# Patient Record
Sex: FEMALE | Race: WHITE | NOT HISPANIC OR LATINO | Employment: OTHER | URBAN - METROPOLITAN AREA
[De-identification: names, ages, dates, MRNs, and addresses within clinical notes are randomized per-mention and may not be internally consistent; named-entity substitution may affect disease eponyms.]

---

## 2017-01-05 ENCOUNTER — OFFICE VISIT (OUTPATIENT)
Dept: NEUROSURGERY | Facility: CLINIC | Age: 77
End: 2017-01-05

## 2017-01-05 VITALS
WEIGHT: 143 LBS | HEIGHT: 63 IN | TEMPERATURE: 96.3 F | SYSTOLIC BLOOD PRESSURE: 120 MMHG | BODY MASS INDEX: 25.34 KG/M2 | DIASTOLIC BLOOD PRESSURE: 70 MMHG

## 2017-01-05 DIAGNOSIS — G56.03 BILATERAL CARPAL TUNNEL SYNDROME: ICD-10-CM

## 2017-01-05 DIAGNOSIS — G56.21 ULNAR NEUROPATHY AT ELBOW OF RIGHT UPPER EXTREMITY: Primary | ICD-10-CM

## 2017-01-05 PROCEDURE — 99024 POSTOP FOLLOW-UP VISIT: CPT | Performed by: NEUROLOGICAL SURGERY

## 2017-01-05 NOTE — PROGRESS NOTES
Alanna Rodriges  1940  [id number]                       CURRENT WORKING DIAGNOSIS:  Status post decompression right ulnar nerve         MEDICAL HISTORY SINCE LAST ENCOUNTER:  She has both clinically and subjectively better.  Her strength has improved and she has less paresthesia and numbness in the distribution of our nerve.            Past Medical History   Diagnosis Date   • Anemia    • Anxiety disorder    • Arthritis    • Cataract    • Depression    • Dermatitis    • DVT (deep venous thrombosis)      1975   • Elbow pain    • GERD (gastroesophageal reflux disease)    • Hepatitis C    • History of blood transfusion    • History of hepatitis C virus infection    • History of migraine    • Hypothyroidism    • Kidney stones    • Migraine      occular   • PONV (postoperative nausea and vomiting)    • Ptosis due to aging    • Seborrhea    • Seizure 1975     during hospitalization as a side effect of Tofranil    • Shoulder pain      right   • Spinal headache               Past Surgical History   Procedure Laterality Date   • Ileostomy     • Colectomy total     • Bunionectomy Bilateral    • Hysterectomy       bso   • D&c and laparoscopy     • Urethral dilation       multiple   • Skin biopsy     • Colon surgery     • Ulnar nerve decompression Right 12/9/2016     Procedure: RIGHT ULNAR NERVE DECOMPRESSION;  Surgeon: Edmond Mccrary MD;  Location: [facility];  Service:               Family History   Problem Relation Age of Onset   • Alzheimer's disease Other    • Leukemia Other               Social History     Social History   • Marital status:      Spouse name: N/A   • Number of children: N/A   • Years of education: N/A     Occupational History   • Not on file.     Social History Main Topics   • Smoking status: Former Smoker   • Smokeless tobacco: Never Used      Comment: quit smoking in her 40's    • Alcohol use 8.4 oz/week     14 Glasses of wine per week   • Drug use: No   • Sexual activity: Defer     Other  Topics Concern   • Not on file     Social History Narrative              Allergies   Allergen Reactions   • Codeine Nausea Only   • Morphine And Related Hallucinations     After 3 days              Current Outpatient Prescriptions:   •  ALPRAZolam (XANAX) 0.25 MG tablet, Take 0.25 mg by mouth As Needed for anxiety., Disp: , Rfl:   •  aspirin 81 MG chewable tablet, Chew 81 mg Daily. Last dose 11/28/2016, Disp: , Rfl:   •  Biotin (BIOTIN MAXIMUM STRENGTH) 10 MG tablet, Take  by mouth 2 (Two) Times a Day. Stopped one week prior to surgery, Disp: , Rfl:   •  Cholecalciferol (D3-1000) 1000 UNITS capsule, Take 1 capsule by mouth 2 (Two) Times a Day. Stopped one week prior to surgery, Disp: , Rfl:   •  dicyclomine (BENTYL) 10 MG capsule, Take 10 mg by mouth Every Night., Disp: , Rfl:   •  estrogens, conjugated, (PREMARIN) 0.625 MG tablet, Take 0.625 mg by mouth Daily., Disp: , Rfl:   •  ketoconazole (NIZORAL) 2 % cream, Apply 1 application topically Daily. Shampoo weekly, Disp: , Rfl:   •  ketoconazole (NIZORAL) 2 % shampoo, Apply  topically 1 (One) Time Per Week., Disp: , Rfl:   •  levothyroxine (SYNTHROID, LEVOTHROID) 75 MCG tablet, Take 75 mcg by mouth Daily., Disp: , Rfl:   •  Omega-3 Fatty Acids (FISH OIL PO), Take  by mouth 2 (Two) Times a Day. Stopped one week prior to surgery, Disp: , Rfl:   •  omeprazole (priLOSEC) 40 MG capsule, Take 40 mg by mouth Daily., Disp: , Rfl:   •  polyethyl glycol-propyl glycol (SYSTANE) 0.4-0.3 % solution ophthalmic solution, 1 drop 3 (Three) Times a Day., Disp: , Rfl:   •  RABEprazole (ACIPHEX) 20 MG EC tablet, Take 20 mg by mouth Daily., Disp: , Rfl:   •  vitamin B-12 (CYANOCOBALAMIN) 500 MCG tablet, Take 500 mcg by mouth Daily., Disp: , Rfl:          Review of Systems   Constitutional: Negative for activity change, appetite change, chills, diaphoresis, fatigue, fever and unexpected weight change.   HENT: Negative for congestion, dental problem, drooling, ear discharge, ear pain,  "facial swelling, hearing loss, mouth sores, nosebleeds, postnasal drip, rhinorrhea, sinus pressure, sneezing, sore throat, tinnitus, trouble swallowing and voice change.    Eyes: Negative for photophobia, pain, discharge, redness, itching and visual disturbance.   Respiratory: Negative for apnea, cough, choking, chest tightness, shortness of breath, wheezing and stridor.    Cardiovascular: Negative for chest pain, palpitations and leg swelling.   Gastrointestinal: Negative for abdominal distention, abdominal pain, anal bleeding, blood in stool, constipation, diarrhea, nausea, rectal pain and vomiting.   Musculoskeletal: Negative for arthralgias, back pain, gait problem, joint swelling, myalgias, neck pain and neck stiffness.   Skin: Negative for color change, pallor, rash and wound.   Allergic/Immunologic: Negative for environmental allergies, food allergies and immunocompromised state.   Neurological: Negative for dizziness, tremors, seizures, syncope, facial asymmetry, speech difficulty, weakness, light-headedness, numbness and headaches.   Hematological: Negative for adenopathy. Does not bruise/bleed easily.   Psychiatric/Behavioral: Negative for agitation, behavioral problems, confusion, decreased concentration, dysphoric mood, self-injury, sleep disturbance and suicidal ideas. The patient is not nervous/anxious and is not hyperactive.                Vitals:    01/05/17 1615   BP: 120/70   Temp: 96.3 °F (35.7 °C)   Weight: 143 lb (64.9 kg)   Height: 63\" (160 cm)               EXAMINATION: The wound is well-healed.  She has good strength which she did not have prior.             MEDICAL DECISION MAKING: Status post ulnar decompression            ASSESSMENT/DISPOSITION:  Referred her to physical therapy for continuation of rehabilitation.              I APPRECIATE THE OPPORTUNITY OF THIS REFERRAL. PLEASE CALL IF ANY       QUESTIONS [phone]            Scribed for Edmond Mccrary MD by Ninoska Gomes CMA. " 1/5/2017  4:17 PM          I have read and concur with the information provided by the scribe.      Edmond Mccrary MD

## 2017-01-05 NOTE — MR AVS SNAPSHOT
Alanna SOLORIO Antelmo   1/5/2017 4:30 PM   Office Visit    Dept Phone:  [phone]   Encounter #:  [id number]    Provider:  Edmond Mccrary MD   Department:  [facility]                Your Full Care Plan              Today's Medication Changes          These changes are accurate as of: 1/5/17 11:59 PM.  If you have any questions, ask your nurse or doctor.               Stop taking medication(s)listed here:     atorvastatin 10 MG tablet   Commonly known as:  LIPITOR   Stopped by:  Edmond Mccrary MD           oxyCODONE-acetaminophen 5-325 MG per tablet   Commonly known as:  PERCOCET   Stopped by:  Edmond Mccrary MD                      Your Updated Medication List          This list is accurate as of: 1/5/17 11:59 PM.  Always use your most recent med list.                ALPRAZolam 0.25 MG tablet   Commonly known as:  XANAX       aspirin 81 MG chewable tablet       BIOTIN MAXIMUM STRENGTH 10 MG tablet   Generic drug:  Biotin       D3-1000 1000 UNITS capsule   Generic drug:  Cholecalciferol       dicyclomine 10 MG capsule   Commonly known as:  BENTYL       FISH OIL PO       * ketoconazole 2 % shampoo   Commonly known as:  NIZORAL       * ketoconazole 2 % cream   Commonly known as:  NIZORAL       levothyroxine 75 MCG tablet   Commonly known as:  SYNTHROID, LEVOTHROID       omeprazole 40 MG capsule   Commonly known as:  priLOSEC       polyethyl glycol-propyl glycol 0.4-0.3 % solution ophthalmic solution   Commonly known as:  SYSTANE       PREMARIN 0.625 MG tablet   Generic drug:  estrogens (conjugated)       RABEprazole 20 MG EC tablet   Commonly known as:  ACIPHEX       vitamin B-12 500 MCG tablet   Commonly known as:  CYANOCOBALAMIN       * Notice:  This list has 2 medication(s) that are the same as other medications prescribed for you. Read the directions carefully, and ask your doctor or other care provider to review them with you.            We  Performed the Following     Ambulatory Referral to Physical Therapy Evaluate and treat, Neuro       You Were Diagnosed With        Codes Comments    Ulnar neuropathy at elbow of right upper extremity    -  Primary ICD-10-CM: G56.21  ICD-9-CM: 354.2     Bilateral carpal tunnel syndrome     ICD-10-CM: G56.03  ICD-9-CM: 354.0       Instructions     None    Patient Instructions History      Upcoming Appointments     Visit Type Date Time Department    MEDICARE - FOLLOWUP PT 2017 11:00 AM [facility]    MEDICARE - FOLLOWUP PT 3/1/2017  2:00 PM [facility]      MyChart Signup     [employer] allows you to send messages to your doctor, view your test results, renew your prescriptions, schedule appointments, and more. To sign up, go to [employer] and click on the Sign Up Now link in the New User? box. Enter your [facility] Activation Code exactly as it appears below along with the last four digits of your Social Security Number and your Date of Birth () to complete the sign-up process. If you do not sign up before the expiration date, you must request a new code.    [facility] Activation Code: [email or web address]  Expires: 2017  9:47 AM    If you have questions, you can email [email or web address] or call [phone] to talk to our [facility] staff. Remember, [employer] is NOT to be used for urgent needs. For medical emergencies, dial 911.               Other Info from Your Visit           Your Appointments     2017 11:00 AM EST   PT FOLLOWUP with Brody Leon, PT   [facility] (--)    [address] [address]   [phone]            Mar 01, 2017  2:00 PM EST   PT FOLLOWUP with Brody Leon PT   [facility] (--)    [address] [address]   [phone]              Allergies     Codeine  Nausea Only    Morphine And Related  Hallucinations    After 3 days     "  Reason for Visit     Follow-up           Vital Signs     Blood Pressure Temperature Height Weight Body Mass Index Smoking Status    120/70 96.3 °F (35.7 °C) 63\" (160 cm) 143 lb (64.9 kg) 25.33 kg/m2 Former Smoker      Problems and Diagnoses Noted     Ulnar neuropathy at elbow of right upper extremity    Bilateral carpal tunnel syndrome            "

## 2017-01-05 NOTE — LETTER
January 9, 2017     Ismael Jones MD  [address] [id number]    Patient: Alanna Rodriges   YOB: 1940   Date of Visit: 1/5/2017       Dear Dr. Robert MD:    Alanna Rodriges was in my office today. Below is a copy of my note.    If you have questions, please do not hesitate to call me. I look forward to following Alanna along with you.         Sincerely,        Edmond Mccrary MD        CC: Romaine Tanner MD    Alanna Rodriges  1940  [id number]                       CURRENT WORKING DIAGNOSIS:  Status post decompression right ulnar nerve         MEDICAL HISTORY SINCE LAST ENCOUNTER:  She has both clinically and subjectively better.  Her strength has improved and she has less paresthesia and numbness in the distribution of our nerve.            Past Medical History   Diagnosis Date   • Anemia    • Anxiety disorder    • Arthritis    • Cataract    • Depression    • Dermatitis    • DVT (deep venous thrombosis)      1975   • Elbow pain    • GERD (gastroesophageal reflux disease)    • Hepatitis C    • History of blood transfusion    • History of hepatitis C virus infection    • History of migraine    • Hypothyroidism    • Kidney stones    • Migraine      occular   • PONV (postoperative nausea and vomiting)    • Ptosis due to aging    • Seborrhea    • Seizure 1975     during hospitalization as a side effect of Tofranil    • Shoulder pain      right   • Spinal headache               Past Surgical History   Procedure Laterality Date   • Ileostomy     • Colectomy total     • Bunionectomy Bilateral    • Hysterectomy       bso   • D&c and laparoscopy     • Urethral dilation       multiple   • Skin biopsy     • Colon surgery     • Ulnar nerve decompression Right 12/9/2016     Procedure: RIGHT ULNAR NERVE DECOMPRESSION;  Surgeon: Edmond Mccrary MD;  Location: [facility];  Service:               Family History   Problem Relation Age of Onset   • Alzheimer's disease Other    •  Leukemia Other               Social History     Social History   • Marital status:      Spouse name: N/A   • Number of children: N/A   • Years of education: N/A     Occupational History   • Not on file.     Social History Main Topics   • Smoking status: Former Smoker   • Smokeless tobacco: Never Used      Comment: quit smoking in her 40's    • Alcohol use 8.4 oz/week     14 Glasses of wine per week   • Drug use: No   • Sexual activity: Defer     Other Topics Concern   • Not on file     Social History Narrative              Allergies   Allergen Reactions   • Codeine Nausea Only   • Morphine And Related Hallucinations     After 3 days              Current Outpatient Prescriptions:   •  ALPRAZolam (XANAX) 0.25 MG tablet, Take 0.25 mg by mouth As Needed for anxiety., Disp: , Rfl:   •  aspirin 81 MG chewable tablet, Chew 81 mg Daily. Last dose 11/28/2016, Disp: , Rfl:   •  Biotin (BIOTIN MAXIMUM STRENGTH) 10 MG tablet, Take  by mouth 2 (Two) Times a Day. Stopped one week prior to surgery, Disp: , Rfl:   •  Cholecalciferol (D3-1000) 1000 UNITS capsule, Take 1 capsule by mouth 2 (Two) Times a Day. Stopped one week prior to surgery, Disp: , Rfl:   •  dicyclomine (BENTYL) 10 MG capsule, Take 10 mg by mouth Every Night., Disp: , Rfl:   •  estrogens, conjugated, (PREMARIN) 0.625 MG tablet, Take 0.625 mg by mouth Daily., Disp: , Rfl:   •  ketoconazole (NIZORAL) 2 % cream, Apply 1 application topically Daily. Shampoo weekly, Disp: , Rfl:   •  ketoconazole (NIZORAL) 2 % shampoo, Apply  topically 1 (One) Time Per Week., Disp: , Rfl:   •  levothyroxine (SYNTHROID, LEVOTHROID) 75 MCG tablet, Take 75 mcg by mouth Daily., Disp: , Rfl:   •  Omega-3 Fatty Acids (FISH OIL PO), Take  by mouth 2 (Two) Times a Day. Stopped one week prior to surgery, Disp: , Rfl:   •  omeprazole (priLOSEC) 40 MG capsule, Take 40 mg by mouth Daily., Disp: , Rfl:   •  polyethyl glycol-propyl glycol (SYSTANE) 0.4-0.3 % solution ophthalmic solution, 1  "drop 3 (Three) Times a Day., Disp: , Rfl:   •  RABEprazole (ACIPHEX) 20 MG EC tablet, Take 20 mg by mouth Daily., Disp: , Rfl:   •  vitamin B-12 (CYANOCOBALAMIN) 500 MCG tablet, Take 500 mcg by mouth Daily., Disp: , Rfl:          Review of Systems   Constitutional: Negative for activity change, appetite change, chills, diaphoresis, fatigue, fever and unexpected weight change.   HENT: Negative for congestion, dental problem, drooling, ear discharge, ear pain, facial swelling, hearing loss, mouth sores, nosebleeds, postnasal drip, rhinorrhea, sinus pressure, sneezing, sore throat, tinnitus, trouble swallowing and voice change.    Eyes: Negative for photophobia, pain, discharge, redness, itching and visual disturbance.   Respiratory: Negative for apnea, cough, choking, chest tightness, shortness of breath, wheezing and stridor.    Cardiovascular: Negative for chest pain, palpitations and leg swelling.   Gastrointestinal: Negative for abdominal distention, abdominal pain, anal bleeding, blood in stool, constipation, diarrhea, nausea, rectal pain and vomiting.   Musculoskeletal: Negative for arthralgias, back pain, gait problem, joint swelling, myalgias, neck pain and neck stiffness.   Skin: Negative for color change, pallor, rash and wound.   Allergic/Immunologic: Negative for environmental allergies, food allergies and immunocompromised state.   Neurological: Negative for dizziness, tremors, seizures, syncope, facial asymmetry, speech difficulty, weakness, light-headedness, numbness and headaches.   Hematological: Negative for adenopathy. Does not bruise/bleed easily.   Psychiatric/Behavioral: Negative for agitation, behavioral problems, confusion, decreased concentration, dysphoric mood, self-injury, sleep disturbance and suicidal ideas. The patient is not nervous/anxious and is not hyperactive.                Vitals:    01/05/17 1615   BP: 120/70   Temp: 96.3 °F (35.7 °C)   Weight: 143 lb (64.9 kg)   Height: 63\" (160 " cm)               EXAMINATION: The wound is well-healed.  She has good strength which she did not have prior.             MEDICAL DECISION MAKING: Status post ulnar decompression            ASSESSMENT/DISPOSITION:  Referred her to physical therapy for continuation of rehabilitation.              I APPRECIATE THE OPPORTUNITY OF THIS REFERRAL. PLEASE CALL IF ANY       QUESTIONS [phone]            Scribed for Edmond Mccrary MD by Ninoska Gomes CMA. 1/5/2017  4:17 PM          I have read and concur with the information provided by the scribe.      Edmond Mccrary MD

## 2017-01-18 ENCOUNTER — TREATMENT (OUTPATIENT)
Dept: PHYSICAL THERAPY | Facility: CLINIC | Age: 77
End: 2017-01-18

## 2017-01-18 DIAGNOSIS — G56.03 BILATERAL CARPAL TUNNEL SYNDROME: Primary | ICD-10-CM

## 2017-01-18 PROCEDURE — 97161 PT EVAL LOW COMPLEX 20 MIN: CPT | Performed by: PHYSICAL THERAPIST

## 2017-01-23 NOTE — PROGRESS NOTES
Physical Therapy Initial Evaluation and Plan of Care    Subjective Evaluation    History of Present Illness  Mechanism of injury: Pt states that she has had chronic carpal tunnel and problems with the right elbow from spending a lot of time writing, typing, and doing heavier lifting when she was younger. The pain started to develop with pressure mostly and then intensified. She had surgery for release recently. Since the surgery she has noticed only having a dull aching pain in the right elbow w/ chronic carpal tunnel.     Quality of life: good    Pain  Current pain ratin  Location: occasionally on the dorsal/proximal ulna to the olecranon and radiating to the distal dorsal humerus   Quality: dull ache  Relieving factors: rest  Aggravating factors: lifting  Progression: improved    Hand dominance: right    Patient Goals  Patient goals for therapy: decreased pain, increased motion and return to sport/leisure activities  Patient goal: Short Term Goals: 2 weeks  1. Pt will be independent with HEP  2. Pt will demonstrate right wrist and elbow AROM equal to that of the left   Long Term Goals: 6 weeks  1. Pt will demonstrate right  and pinhc strength equal to that of the left   2. Pt will report being able to return to working out without increasing pain in the right elbow   3. Pt will report 0/10 pain with all activity            Objective     Observations     Right Elbow   Positive for incision.     Additional Observation Details  Incision is well healed, no signs of infection noted     Palpation     Additional Palpation Details  No tenderness to palpation noted in the right elbow     Active Range of Motion     Left Elbow   Forearm supination: 108 degrees   Forearm pronation: 98 degrees     Right Elbow   Flexion: WFL  Extension: WFL  Forearm supination: 108 degrees   Forearm pronation: 72 degrees     Left Wrist   Radial deviation: 15 degrees   Ulnar deviation: 37 degrees     Right Wrist   Radial deviation: 10  degrees   Ulnar deviation: 21 degrees     Strength/Myotome Testing     Left Wrist/Hand      (2nd hand position)     Trial 1: 44    Thumb Strength  Key/Lateral Pinch     Trail 1: 12    Right Wrist/Hand      (2nd hand position)     Trial 1: 31    Thumb Strength   Key/Lateral Pinch     Trial 1: 8         Assessment & Plan     Assessment  Impairments: abnormal or restricted ROM, activity intolerance, impaired physical strength, lacks appropriate home exercise program and pain with function  Assessment details: Pt presents with s/s consistent with s/p R elbow ulnar nerve release. She will benefit from skilled PT services to address the listed impairments   Prognosis: good    Goals  Short Term Goals: 2 weeks  1. Pt will be independent with HEP  2. Pt will demonstrate right wrist and elbow AROM equal to that of the left   Long Term Goals: 6 weeks  1. Pt will demonstrate right  and pinhc strength equal to that of the left   2. Pt will report being able to return to working out without increasing pain in the right elbow   3. Pt will report 0/10 pain with all activity     Plan  Therapy options: will be seen for skilled physical therapy services  Planned modality interventions: cryotherapy, electrical stimulation/Russian stimulation, fluidotherapy, high voltage pulsed current (pain management), iontophoresis, microcurrent electrical stimulation, TENS, thermotherapy (hydrocollator packs) and ultrasound  Planned therapy interventions: body mechanics training, flexibility, functional ROM exercises, home exercise program, IADL retraining, joint mobilization, manual therapy, motor coordination training, neuromuscular re-education, soft tissue mobilization, strengthening, stretching and therapeutic activities  Frequency: 2x week  Duration in weeks: 6  Treatment plan discussed with: patient            PT SIGNATURE: Brody Leon PT   DATE TREATMENT INITIATED: 1/22/2017    Initial Certification  Certification Period:  4/22/2017  I certify that the therapy services are furnished while this patient is under my care.  The services outlined above are required by this patient, and will be reviewed every 90 days.     PHYSICIAN: Edmond Mccrary MD      DATE:     Please sign and return via fax to [phone].. Thank you, [facility].

## 2017-01-25 ENCOUNTER — TREATMENT (OUTPATIENT)
Dept: PHYSICAL THERAPY | Facility: CLINIC | Age: 77
End: 2017-01-25

## 2017-01-25 DIAGNOSIS — G56.03 BILATERAL CARPAL TUNNEL SYNDROME: Primary | ICD-10-CM

## 2017-01-25 PROCEDURE — 97110 THERAPEUTIC EXERCISES: CPT | Performed by: PHYSICAL THERAPIST

## 2017-01-25 PROCEDURE — 97140 MANUAL THERAPY 1/> REGIONS: CPT | Performed by: PHYSICAL THERAPIST

## 2017-01-25 PROCEDURE — 97035 APP MDLTY 1+ULTRASOUND EA 15: CPT | Performed by: PHYSICAL THERAPIST

## 2017-01-26 NOTE — PROGRESS NOTES
Physical Therapy Daily Progress Note    Alanna Rodriges reports that she is feeling fine with her elbow, she has not really had any issues with pain or sensitivity over the scar but her right hand does feel weak at times. She has an appointment with Dr. Mar ahmadi Right carpal tunnel next month.     Subjective     Objective   See Exercise, Manual, and Modality Logs for complete treatment.     Reviewed HEP with the patient today, she was given yellow theraputty for strengthening at home     Assessment/Plan   Pt had some difficulty with certain gripping activities indicating weakness in the right hand, beulah the lumbicals. Pt will be going out of town until next month, she was given a comprehensive HEP     Progress per Plan of Care           Manual Therapy:    13     mins  44619;  Therapeutic Exercise:    34     mins  11072;     Neuromuscular Eloy:        mins  49385;    Therapeutic Activity:          mins  59585;     Gait Training:           mins  79423;     Ultrasound:     10     mins  23666;    Electrical Stimulation:         mins  95166 ( );  Dry Needling          mins self-pay    Timed Treatment:   47   mins   Total Treatment:     65   mins    Brody Leon, PT  Physical Therapist

## 2017-02-14 ENCOUNTER — OFFICE VISIT (OUTPATIENT)
Dept: PAIN MEDICINE | Facility: CLINIC | Age: 77
End: 2017-02-14

## 2017-02-14 VITALS
TEMPERATURE: 97.6 F | OXYGEN SATURATION: 95 % | DIASTOLIC BLOOD PRESSURE: 81 MMHG | HEIGHT: 63 IN | WEIGHT: 147 LBS | HEART RATE: 55 BPM | RESPIRATION RATE: 18 BRPM | SYSTOLIC BLOOD PRESSURE: 156 MMHG | BODY MASS INDEX: 26.05 KG/M2

## 2017-02-14 DIAGNOSIS — M47.816 LUMBAR FACET ARTHROPATHY: ICD-10-CM

## 2017-02-14 DIAGNOSIS — M43.17 SPONDYLOLISTHESIS OF LUMBOSACRAL REGION: Primary | ICD-10-CM

## 2017-02-14 DIAGNOSIS — G56.00 CARPAL TUNNEL SYNDROME, UNSPECIFIED LATERALITY: ICD-10-CM

## 2017-02-14 DIAGNOSIS — M51.36 DDD (DEGENERATIVE DISC DISEASE), LUMBAR: ICD-10-CM

## 2017-02-14 DIAGNOSIS — M16.11 PRIMARY OSTEOARTHRITIS OF RIGHT HIP: ICD-10-CM

## 2017-02-14 DIAGNOSIS — M48.061 LUMBAR SPINAL STENOSIS: ICD-10-CM

## 2017-02-14 DIAGNOSIS — M43.17 SPONDYLOLISTHESIS OF LUMBOSACRAL REGION: ICD-10-CM

## 2017-02-14 DIAGNOSIS — M48.00 NEUROFORAMINAL STENOSIS OF SPINE: ICD-10-CM

## 2017-02-14 DIAGNOSIS — M21.70 LEG LENGTH DISCREPANCY: ICD-10-CM

## 2017-02-14 PROCEDURE — 99214 OFFICE O/P EST MOD 30 MIN: CPT | Performed by: ANESTHESIOLOGY

## 2017-02-14 NOTE — PROGRESS NOTES
"CHIEF COMPLAINT: \"Pain in my lower back, right hip, and right leg.\"    BRIEF HISTORY: Mrs. Alanna Rodriges is a 76 y.o. female, who returns to the clinic for pain and right lower extremity pain.  Patient was last seen on September 19, 2016, when she underwent right sacroiliac joint injection with steroids combined with right greater trochanteric bursa injection and right iliopectineal bursa injection with steroids.  Patient reports that since then, her right hip pain essentially resolved.  Patient reports that approximately she was pain free for 3 weeks and then, she started to gradually experience pain but in a different location. Patient has a 10 year history of pain, which began without incident.   Pain level 2I/10.  Pain level ranges from 1/10-9/10.    Pain increases with standing and walking.  Pain decreases with rest.   Patient describes the pain as aching, burning, and throbbing.  Pain starts in her lower back and radiates into the right hip, the lateral aspect of her thigh, and into the anterolateral aspect of her right leg.  Patient denies  numbness and weakness in the lower extremities. Patient denies  any new bladder or bowel problems. 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   Patient continues with an exercise regimen with a , Pilates weekly, and daily walks.   She reports the following changes in her medical history since her last visit:   Right ulnar nerve decompression with Dr. Mccrary on December 9, 2016 with excellent analgesia can functional outcome.    Review of previous therapies and additional medical records:  Alanna Rodriges has already failed the following measures, including:   Conservative measures: oral analgesics, opioids, topical analgesics, massage, physical therapy, ice, heat and supervised exercise program, chiropractor.   Interventional: As referenced above  Surgical: No previous spine surgery  Alanna Rodriges underwent surgical  consultation with Dr. Villalta, who found " her not to be a surgical candidate.   I have reviewed her Rogerio Report #[id number] with medication reconciliation.    Review of New Diagnostic Studies:   EMG/NCV which confirm right ulnar neuropathy at the elbow and bilateral carpal tunnel syndrome  CT scan of the brain September 27, 2016, revealed mild cerebral atrophy and white matter changes, nonspecific and likely secondary to chronic small vessel ischemia.  MRI of the cervical spine without contrast November 20, 2016 revealed moderate spondylosis and a stenosis of the cervical spine.  Disc osteophyte complexes at almost every level.  Hypertrophic facet changes.  Central canal is stenosis more prominent at C5-C6.  Various degrees of neuroforaminal stenosis.    Review of Previous Diagnostic Studies:  SPINE LUMBAR FLEX AND EXTENSION 09/23/2015- There is limited to no range of motion with flexion and extension. There is complete collapse of L5-S1 disc space with sclerosis and osteophyte formation. There is a dense irregular calcification anterior to the L4 vertebral body. There are osteophytes at the L1-L2 level projecting anteriorly with sclerosis and narrowing of the disc space. There is no obvious laxity, subluxation or instability, however, the difference in the alignment between flexion and extension is negligible.  MRI Lumbar w/o contrast, 11/14/2014: Mild disc bulge, mild degenerative facets and thickening of the ligamentum flavum with no central or foraminal stenosis at L1-L2, L2-L3, L3-L4, and L4-L5. There is grade 1 retrolisthesis of L5 with respect to S1. At L5-S1, mild right and moderate left facet arthropathy. The left facet does abut the posterior aspect of the thecal sac. Mild bilateral narrowing of the bilateral neural foramen.   X-ray right hip, 11/12/2014: Mild right hip osteoarthritis  Bone Density, 09/04/2013: Normal     Review of Systems   Musculoskeletal: Positive for arthralgias, back pain, gait problem, myalgias, neck pain and neck  "stiffness.   Neurological: Positive for headaches.   Hematological: Bruises/bleeds easily.   Psychiatric/Behavioral: Positive for sleep disturbance. The patient is nervous/anxious.    All other systems reviewed and are negative.     The following portions of the patient's history were reviewed and updated as appropriate: problem list, past medical history, past surgery history, social history, family history, medications, and allergies     Visit Vitals   • /81 (BP Location: Left arm, Patient Position: Sitting)   • Pulse 55   • Temp 97.6 °F (36.4 °C) (Temporal Artery )   • Resp 18   • Ht 63\" (160 cm)   • Wt 147 lb (66.7 kg)   • SpO2 95%   • BMI 26.04 kg/m2      Physical Exam   Neurologic Exam  Constitutional   General appearance: No acute distress, well appearing and well nourished. Appears healthy, well hydrated and appearance reflects stated age.   Head and face: Normal. Palpation of the face and sinuses: No sinus tenderness.   Eyes: Conjunctiva and lids: No swelling, erythema or discharge. Pupils: Equal, round, reactive to light.   Neck: Supple, symmetric, trachea midline, no masses.   Pulmonary: Respiratory effort: No increased work of breathing or signs of respiratory distress. Auscultation of lungs: Clear to auscultation.   Cardiovascular: Auscultation of heart: Normal rate and rhythm, normal S1 and S2, no murmurs. Peripheral vascular exam: Normal.   Examination of extremities for edema and/or varicosities: Normal.   Abdomen: Non-tender, no masses. Bowel sounds were normal. The abdomen was soft and nontender. No masses palpated.   Musculoskeletal   Gait and station: Normal. Gait evaluation demonstrated a normal gait. The range of motion of the lumbar spine is remarkably improved. Lumbar facet joint loading maneuvers are negative. Lenny and Gaenslen's tests are negative. The range of motion of the hip joints is almost full and without pain. Palpation of the greater trochanter, does not reproduce pain. " Palpation of the right iliopectineal bursa, does not reproduce pain   Skin and subcutaneous tissue: Normal without rashes or lesions.   Neurologic   Cranial nerves: Cranial nerves II-XII intact.   Cortical function: Normal mental status.   Reflexes: 2+ and symmetric. Deep tendon reflexes: 2+ right biceps, 2+ left biceps, 2+ right patella, 2+ left patella, 2+ right ankle jerk and 2+ left ankle jerk. Superficial/Primitive Reflexes: primitive reflexes were absent. Straight leg raising test is negative. Femoral stretch sign is negative.   Sensation: No sensory loss. Sensory exam: intact to light touch, intact pain and temperature sensation, intact vibration sensation and normal proprioception.   Coordination: Normal finger to nose and heel to shin. Coordination: normal balance and negative Romberg's sign.   Psychiatric   Judgment and insight: Normal.   Orientation to person, place, and time: Normal.   Recent and remote memory: Intact.   Mood and affect: Normal.      ASSESSMENT:   1. Spondylolisthesis of lumbosacral region, L5-S1    2. Neuroforaminal stenosis of spine    3. DDD (degenerative disc disease), lumbar    4. Lumbar facet arthropathy    5. Primary osteoarthritis of right hip    6. Leg length discrepancy      PLAN: Patient continues dealing with her chronic pain condition.  Upon reviewing previous treatments, her mechanical lower back pain has resolved since lumbar medial branch rhizotomies performed on one year ago, and her other sources of mechanical and soft tissue pain such as right sacroiliac joint dysfunction, right greater trochanteric bursitis and right iliopectineal bursitis, resolved after respective injections on September 19, 2016.  Patient presents today with a different type of pain with predominant neuropathic features.  Patient has a history of lumbar spondylolisthesis and neuroforaminal stenosis.  I have reviewed all available patient's medical records as well as previous therapies as referenced  above under history of present illness.  Therefore, I have proposed the following plan:   1.  Diagnostic tests: MRI of the lumbar spine without contrast.  Patient has requested open MRI.  Patient will contact my office with her MRIs done in order to obtain images and report to discuss further treatment.  2.  Follow-up with Dr. Edmnod Mccrary in consultation for bilateral carpal tunnel syndrome.  Patient has been exceedingly well after right ulnar nerve release with Dr. Mccrary.  3.  Interventional pain management measures: I have already discussed with the patient the possibility of diagnostic and therapeutic right transforaminal epidural steroid injection at the affected levels based on MRI findings, history and physical examination (L4-L5/L5-S1).  If patient fails to obtain sustained pain relief, then, I will obtain neurosurgical consultation.  2. Pharmacological measures: Trial with Pennsaid  3. Long-term rehabilitation efforts:  a. Patient will start a comprehensive physical therapy program for reconditioning, water therapy, therapeutic exercise, core strengthening, gait and balance training, neurodynamics, ultrasound, ASTYM, E-STIM, myofascial release, and dry needling once her pain is under control   b. Continue Pilates   c. Continue low impact exercise program with   d. [facility] orth[facility] with 0.5 cm right left  4. The patient has been instructed to contact my office with any questions or difficulties. The patient understands the plan and agrees to proceed accordingly.     Patient Care Team:  Ismael Jones MD as PCP - General  Ismael Jones MD as Referring Physician (Internal Medicine)  Romaine Tanner MD as Consulting Physician (Pain Medicine)  Edmond Mccrary MD as Consulting Physician (Neurosurgery)     No orders of the defined types were placed in this encounter.        Future Appointments  Date Time Provider Department Center    2/16/2017 11:00 AM Brody Leon, PT [facility] None         MD ISHAAN Benson Dragon/Transcription disclaimer:  Much of this encounter note is an electronic transcription of spoken language to printed text. Electronic transcription of spoken language may permit erroneous, or at times, nonsensical words or phrases to be inadvertently transcribed. Although I have reviewed the note for such errors, some may still exist.

## 2017-02-16 ENCOUNTER — TREATMENT (OUTPATIENT)
Dept: PHYSICAL THERAPY | Facility: CLINIC | Age: 77
End: 2017-02-16

## 2017-02-16 DIAGNOSIS — G56.03 BILATERAL CARPAL TUNNEL SYNDROME: Primary | ICD-10-CM

## 2017-02-16 PROCEDURE — 97110 THERAPEUTIC EXERCISES: CPT | Performed by: PHYSICAL THERAPIST

## 2017-02-16 PROCEDURE — 97140 MANUAL THERAPY 1/> REGIONS: CPT | Performed by: PHYSICAL THERAPIST

## 2017-02-17 NOTE — PROGRESS NOTES
Physical Therapy Daily Progress Note    Alanna Rodriges reports that she is doing very well, she did not have any issues while on vacation and only has slight discomfort with movements of the arm that stretch the ulnar nerve. Pt is going to talk with her MD about carpal tunnel surgery    Subjective     Objective   See Exercise, Manual, and Modality Logs for complete treatment.       Assessment/Plan     Pt has recovered very well to surgery and has progressed well with therapy.     Progress per Plan of Care           Manual Therapy:    16     mins  60177;  Therapeutic Exercise:    34     mins  76511;         Timed Treatment:   50   mins   Total Treatment:     55   mins    Brody Leon, PT  Physical Therapist

## 2017-02-22 ENCOUNTER — TREATMENT (OUTPATIENT)
Dept: PHYSICAL THERAPY | Facility: CLINIC | Age: 77
End: 2017-02-22

## 2017-02-22 DIAGNOSIS — G56.03 BILATERAL CARPAL TUNNEL SYNDROME: Primary | ICD-10-CM

## 2017-02-22 PROCEDURE — 97110 THERAPEUTIC EXERCISES: CPT | Performed by: PHYSICAL THERAPIST

## 2017-02-22 PROCEDURE — 97140 MANUAL THERAPY 1/> REGIONS: CPT | Performed by: PHYSICAL THERAPIST

## 2017-02-24 NOTE — PROGRESS NOTES
Physical Therapy Daily Progress Note    Alanna Rodriges reports that she is feeling better in the elbow, she has been able to use the elbow for all activities but she is limited with her hand because of the carpal tunnel. Pt is seeing Dr. Mccrary about carpal tunnel surgery     Subjective     Objective   See Exercise, Manual, and Modality Logs for complete treatment.       Assessment/Plan     Pt came to her visit late today and we were limited by what we could do in the clinic. Overall she has progressed very well post-surgical.     Progress per Plan of Care           Manual Therapy:    15     mins  93816;  Therapeutic Exercise:    13     mins  49911;         Timed Treatment:   28   mins   Total Treatment:     30   mins    Brody Leon, PT  Physical Therapist

## 2017-02-28 ENCOUNTER — OFFICE VISIT (OUTPATIENT)
Dept: NEUROSURGERY | Facility: CLINIC | Age: 77
End: 2017-02-28

## 2017-02-28 VITALS
TEMPERATURE: 96.7 F | DIASTOLIC BLOOD PRESSURE: 72 MMHG | BODY MASS INDEX: 25.41 KG/M2 | WEIGHT: 143.4 LBS | SYSTOLIC BLOOD PRESSURE: 120 MMHG | HEIGHT: 63 IN

## 2017-02-28 DIAGNOSIS — G56.01 CARPAL TUNNEL SYNDROME OF RIGHT WRIST: Primary | ICD-10-CM

## 2017-02-28 DIAGNOSIS — M51.36 DEGENERATIVE DISC DISEASE, LUMBAR: ICD-10-CM

## 2017-02-28 DIAGNOSIS — M53.3 SACRO-ILIAC PAIN: ICD-10-CM

## 2017-02-28 PROCEDURE — 99024 POSTOP FOLLOW-UP VISIT: CPT | Performed by: NEUROLOGICAL SURGERY

## 2017-02-28 RX ORDER — MELOXICAM 7.5 MG/1
7.5 TABLET ORAL 2 TIMES DAILY
Qty: 60 TABLET | Refills: 0 | Status: SHIPPED | OUTPATIENT
Start: 2017-02-28 | End: 2017-07-21 | Stop reason: SDUPTHER

## 2017-02-28 NOTE — PROGRESS NOTES
Alanna Rodriges  1940  [id number]                       CURRENT WORKING DIAGNOSIS:  Degenerative osteoarthritis          MEDICAL HISTORY SINCE LAST ENCOUNTER:  This 76-year-old female presents with symptoms consistent with degenerative osteoarthritis both in the cervical and lumbar region.  She had her ulnar nerve decompression is clearly better in the context of increased strength in her .  However she is having some nocturnal pain in the distribution of the median nerve.  The nerve conduction studies showed a mild median neuropathy on the right as well.  In addition she is having pain in her right sacroiliac joint which radiates into the right leg.  Facet block and epidural steroid injections have helped somewhat but have not been completely restorative.  She has had a recent lumbar MRI is referred back for neurosurgical consultation and recommendations.    Therefore she has 2 new issues: Symptoms consistent with an suggestive of median neuropathy and sacroiliac joint dysfunction or degenerative disc disease.            Past Medical History   Diagnosis Date   • Anemia    • Anxiety disorder    • Arthritis    • Cataract    • Depression    • Dermatitis    • DVT (deep venous thrombosis)      1975   • Elbow pain    • GERD (gastroesophageal reflux disease)    • Hepatitis C    • History of blood transfusion    • History of hepatitis C virus infection    • History of migraine    • Hypothyroidism    • Kidney stones    • Migraine      occular   • PONV (postoperative nausea and vomiting)    • Ptosis due to aging    • Seborrhea    • Seizure 1975     during hospitalization as a side effect of Tofranil    • Shoulder pain      right   • Spinal headache               Past Surgical History   Procedure Laterality Date   • Ileostomy     • Colectomy total     • Bunionectomy Bilateral    • Hysterectomy       bso   • D&c and laparoscopy     • Urethral dilation       multiple   • Skin biopsy     • Colon surgery     • Ulnar nerve  decompression Right 12/9/2016     Procedure: RIGHT ULNAR NERVE DECOMPRESSION;  Surgeon: Edmond Mccrary MD;  Location: [facility];  Service:               Family History   Problem Relation Age of Onset   • Alzheimer's disease Other    • Leukemia Other               Social History     Social History   • Marital status:      Spouse name: N/A   • Number of children: N/A   • Years of education: N/A     Occupational History   • Not on file.     Social History Main Topics   • Smoking status: Former Smoker   • Smokeless tobacco: Never Used      Comment: quit smoking in her 40's    • Alcohol use 8.4 oz/week     14 Glasses of wine per week   • Drug use: No   • Sexual activity: Defer     Other Topics Concern   • Not on file     Social History Narrative              Allergies   Allergen Reactions   • Codeine Nausea Only   • Morphine And Related Hallucinations     After 3 days              Current Outpatient Prescriptions:   •  ALPRAZolam (XANAX) 0.25 MG tablet, Take 0.25 mg by mouth As Needed for anxiety., Disp: , Rfl:   •  aspirin 81 MG chewable tablet, Chew 81 mg Daily. Last dose 11/28/2016, Disp: , Rfl:   •  Biotin (BIOTIN MAXIMUM STRENGTH) 10 MG tablet, Take  by mouth 2 (Two) Times a Day. Stopped one week prior to surgery, Disp: , Rfl:   •  Cholecalciferol (D3-1000) 1000 UNITS capsule, Take 1 capsule by mouth 2 (Two) Times a Day. Stopped one week prior to surgery, Disp: , Rfl:   •  dicyclomine (BENTYL) 10 MG capsule, Take 10 mg by mouth Every Night., Disp: , Rfl:   •  estrogens, conjugated, (PREMARIN) 0.625 MG tablet, Take 0.625 mg by mouth Daily., Disp: , Rfl:   •  ketoconazole (NIZORAL) 2 % cream, Apply 1 application topically Daily. Shampoo weekly, Disp: , Rfl:   •  ketoconazole (NIZORAL) 2 % shampoo, Apply  topically 1 (One) Time Per Week., Disp: , Rfl:   •  levothyroxine (SYNTHROID, LEVOTHROID) 75 MCG tablet, Take 75 mcg by mouth Daily., Disp: , Rfl:   •  Omega-3 Fatty Acids (FISH OIL PO), Take  by mouth 2  (Two) Times a Day. Stopped one week prior to surgery, Disp: , Rfl:   •  omeprazole (priLOSEC) 40 MG capsule, Take 40 mg by mouth Daily., Disp: , Rfl:   •  polyethyl glycol-propyl glycol (SYSTANE) 0.4-0.3 % solution ophthalmic solution, 1 drop 3 (Three) Times a Day., Disp: , Rfl:   •  RABEprazole (ACIPHEX) 20 MG EC tablet, Take 20 mg by mouth Daily., Disp: , Rfl:          Review of Systems   Constitutional: Negative for activity change, appetite change, chills, diaphoresis, fatigue, fever and unexpected weight change.   HENT: Negative for congestion, dental problem, drooling, ear discharge, ear pain, facial swelling, hearing loss, mouth sores, nosebleeds, postnasal drip, rhinorrhea, sinus pressure, sneezing, sore throat, tinnitus, trouble swallowing and voice change.    Eyes: Negative for photophobia, pain, discharge, redness, itching and visual disturbance.   Respiratory: Negative for apnea, cough, choking, chest tightness, shortness of breath, wheezing and stridor.    Cardiovascular: Negative for chest pain, palpitations and leg swelling.   Gastrointestinal: Negative for abdominal distention, abdominal pain, anal bleeding, blood in stool, constipation, diarrhea, nausea, rectal pain and vomiting.   Endocrine: Negative for cold intolerance, heat intolerance, polydipsia, polyphagia and polyuria.   Genitourinary: Negative for decreased urine volume, difficulty urinating, dysuria, enuresis, flank pain, frequency, genital sores, hematuria and urgency.   Musculoskeletal: Positive for arthralgias and back pain. Negative for gait problem, joint swelling, myalgias, neck pain and neck stiffness.   Skin: Negative for color change, pallor, rash and wound.   Allergic/Immunologic: Negative for environmental allergies, food allergies and immunocompromised state.   Neurological: Negative for dizziness, tremors, seizures, syncope, facial asymmetry, speech difficulty, weakness, light-headedness, numbness and headaches.  "  Hematological: Negative for adenopathy. Does not bruise/bleed easily.   Psychiatric/Behavioral: Negative for agitation, behavioral problems, confusion, decreased concentration, dysphoric mood, hallucinations, self-injury, sleep disturbance and suicidal ideas. The patient is not nervous/anxious and is not hyperactive.    All other systems reviewed and are negative.              Vitals:    02/28/17 0904   BP: 120/72   BP Location: Right arm   Patient Position: Sitting   Cuff Size: Adult   Temp: 96.7 °F (35.9 °C)   TempSrc: Temporal Artery    Weight: 143 lb 6.4 oz (65 kg)   Height: 63\" (160 cm)               EXAMINATION: She had decreased range of motion cervical lumbar area.  She has atrophy of the interosseous on the right.  Her  is markedly improved than it was preoperatively and has shown so.  She does, however, have some atrophy in the thenar eminence which is consistent with a median neuropathy.  The deep tendon reflexes are elicitable.  Straight leg raising [place] flip test are negative.  There is no Babinski Jay or clonus.            MEDICAL DECISION MAKING: Her MRI showed degenerative disc disease throughout.  His particular severe at L5/S1 where she has Modic changes.  There is no high-grade neural foraminal closure Neuroforaminal and closure.           ASSESSMENT/DISPOSITION: Degenerative osteoarthritis, cervical and lumbar; mild median neuropathy.  I've given her prescription of Mobic 7.5 mg twice a day and    Sent her to physical therapy.  I've also ask her to wear her wrist splint which she has in the past.  She will call me in 2 weeks.               I APPRECIATE THE OPPORTUNITY OF THIS REFERRAL. PLEASE CALL IF ANY  QUESTIONS [phone]    Scribed for Edmond Mccrary MD by Ninoska Gomes CMA. 2/28/2017  10:03 AM     I have read and concur with the information provided by the scribe.  Edmond Mccrary MD      "

## 2017-02-28 NOTE — PATIENT INSTRUCTIONS
In two weeks, call Dr. Mccrary on a Monday or Tuesday, and leave a message with Jamee (). Dr. Mccrary will call you back at the end of the day as soon as he can.

## 2017-03-01 ENCOUNTER — TRANSCRIBE ORDERS (OUTPATIENT)
Dept: PHYSICAL THERAPY | Facility: CLINIC | Age: 77
End: 2017-03-01

## 2017-03-01 ENCOUNTER — TREATMENT (OUTPATIENT)
Dept: PHYSICAL THERAPY | Facility: CLINIC | Age: 77
End: 2017-03-01

## 2017-03-01 DIAGNOSIS — M53.3 DISORDERS OF SACRUM: ICD-10-CM

## 2017-03-01 DIAGNOSIS — G56.03 BILATERAL CARPAL TUNNEL SYNDROME: Primary | ICD-10-CM

## 2017-03-01 DIAGNOSIS — M51.36 DEGENERATION OF LUMBAR INTERVERTEBRAL DISC: Primary | ICD-10-CM

## 2017-03-01 PROCEDURE — 97140 MANUAL THERAPY 1/> REGIONS: CPT | Performed by: PHYSICAL THERAPIST

## 2017-03-01 PROCEDURE — 97035 APP MDLTY 1+ULTRASOUND EA 15: CPT | Performed by: PHYSICAL THERAPIST

## 2017-03-01 NOTE — PROGRESS NOTES
Physical Therapy Daily Progress Note    Subjective   Alanna Rodriges reports that her elbow is feeling great, no has no issues. Pt reports that her MD wants to hold off on surgery for the right carpal tunnel release and try therapy first.     Objective   See Exercise, Manual, and Modality Logs for complete treatment.       Assessment/Plan    No exacerbation of symptoms with therapy today. Will moniter patients progress with HEP and assess at later date. May consider dry needling for carpal tunnel at a future visit.     Progress per Plan of Care           Manual Therapy:    16     mins  37366;  Ultrasound:     10     mins  [id number];        Timed Treatment:   26   mins   Total Treatment:     35   mins    Brody Leon, PT  Physical Therapist

## 2017-03-15 ENCOUNTER — TREATMENT (OUTPATIENT)
Dept: PHYSICAL THERAPY | Facility: CLINIC | Age: 77
End: 2017-03-15

## 2017-03-15 DIAGNOSIS — G89.29 CHRONIC LOW BACK PAIN, UNSPECIFIED BACK PAIN LATERALITY, WITH SCIATICA PRESENCE UNSPECIFIED: Primary | ICD-10-CM

## 2017-03-15 DIAGNOSIS — M54.5 CHRONIC LOW BACK PAIN, UNSPECIFIED BACK PAIN LATERALITY, WITH SCIATICA PRESENCE UNSPECIFIED: Primary | ICD-10-CM

## 2017-03-15 PROCEDURE — 97161 PT EVAL LOW COMPLEX 20 MIN: CPT | Performed by: PHYSICAL THERAPIST

## 2017-03-15 NOTE — PROGRESS NOTES
Physical Therapy Initial Evaluation and Plan of Care    Patient: Alanna Rodriges   : 1940  Diagnosis/ICD-10 Code:  Chronic low back pain, unspecified back pain laterality, with sciatica presence unspecified [M54.5, G89.29]  Referring practitioner: Edmond Mccrary MD    Subjective Evaluation    History of Present Illness  Mechanism of injury: Pt states that she has constant low back pain that has been present for ~ 10 years. The pain has gotten worse and her back, right hip, and right leg hurt. Pt has pain with crossing her legs, laying on her left side, and twisting/turning. The pain in her leg is mainly on the outside of her right lower leg and to a lesser degree on the outside of her thigh.     Pt has also had neck pain off and on for 20-30 years with headaches. She has had symptoms into the right arm severely for about 10 years. She also has nerve symptoms in her elbow and right hand which she has been seen for here recently.     Quality of life: good    Pain  Current pain ratin (1/10 in the neck)  At best pain ratin  At worst pain ratin (9/10 in the neck for very short periods )  Location: right side of the low back, right hip, and right leg  -- right side of the neck up into the skull   Quality: sharp, burning and dull ache  Relieving factors: rest and medications  Aggravating factors: movement  Progression: worsening    Diagnostic Tests  X-ray: abnormal (see epic)  MRI studies: abnormal (see epic)    Treatments  Previous treatment: physical therapy and chiropractic  Patient Goals  Patient goals for therapy: decreased pain, increased motion, increased strength and independence with ADLs/IADLs             Objective     Palpation     Additional Palpation Details  TTP along lev cervical paraspinals and bilateral upper traps   TTP along the right SI, and piriformis    Active Range of Motion   Cervical/Thoracic Spine   Cervical    Flexion: 51 degrees   Extension: 40 degrees   Left rotation: 18  degrees   Right rotation: 34 degrees     Lumbar   Flexion: Active lumbar flexion: 75%   Extension: Active lumbar extension: 50%   Left lateral flexion: Active left lumbar lateral flexion: 50%   Right lateral flexion: Active right lumbar lateral flexion: 50%     Strength/Myotome Testing     Lumbar   Left   Normal strength    Right   Normal strength    Tests     Lumbar   Negative repeated extension and repeated flexion.     Right   Negative passive SLR.     Additional Tests Details  Supine leg length test + for right sided pelvic obliquity          Assessment & Plan     Assessment  Impairments: abnormal muscle firing, abnormal or restricted ROM, activity intolerance, impaired physical strength, lacks appropriate home exercise program and pain with function  Assessment details: Patient is a 76 year old female who comes to physical therapy with c/o pain in the neck and low back with radiating symptoms into the right leg and possibly the right arm as well. She has been dealing with these issues for several years. Signs and symptoms are consistent with low back pain secondary to possible pelvic obliquity and resulting muscle guarding causing sciatic impingement, Neck pain seems to be related to arthritis and possible disc pathology as well, contributing to symptoms into the cervical spine.   Problems include pain, decreased ROM, decreased strength, and inability to perform all essential functional activities. Pt will benefit from skilled PT services to address the above issues.     Prognosis details:    SHORT TERM GOALS:     2 weeks  1. Pt independent with HEP  2. Pt to demonstrate trunk AROM 50-75% of expected norms to allow for improved ability to perform ADL's  3. Pt to demonstrate bilateral hip strength 4/5 in all planes to improved stability of the core/trunk   4. Pt will demonstrate a 40% or greater improvement in her cervical AROM    LONG TERM GOALS:   6 weeks  1. Pt to demonstrate trunk AROM % of expected  norms to allow for improved ability to perform functional activities  2. Pt to demonstrate ability to perform full functional squat with good form and without increased pain in the low back   3. Pt to demonstrate lifting 10# with both hands without pain   4. Pt to report cessation of pain/numbness/tingling into the right leg to show decreased nerve compression  5. Pt to report no greater than 2/10 pain  In the neck with all activities       Plan  Therapy options: will be seen for skilled physical therapy services  Planned modality interventions: cryotherapy, thermotherapy (hydrocollator packs), electrical stimulation/Croatian stimulation, ultrasound and traction  Planned therapy interventions: abdominal trunk stabilization, manual therapy, therapeutic activities, home exercise program, IADL retraining, joint mobilization, flexibility, body mechanics training, ADL retraining, functional ROM exercises, spinal/joint mobilization, soft tissue mobilization and postural training  Frequency: 2x week  Duration in weeks: 6  Treatment plan discussed with: patient        Evaluation Only    PT SIGNATURE: Brody Leon, RAMÍREZ   DATE TREATMENT INITIATED: 3/15/2017    Initial Certification  Certification Period: 6/13/2017  I certify that the therapy services are furnished while this patient is under my care.  The services outlined above are required by this patient, and will be reviewed every 90 days.     PHYSICIAN: Edmond Mccrary MD      DATE:     Please sign and return via fax to [phone].. Thank you, [facility].

## 2017-03-16 ENCOUNTER — TELEPHONE (OUTPATIENT)
Dept: NEUROSURGERY | Facility: CLINIC | Age: 77
End: 2017-03-16

## 2017-03-16 ENCOUNTER — TELEPHONE (OUTPATIENT)
Dept: PAIN MEDICINE | Facility: CLINIC | Age: 77
End: 2017-03-16

## 2017-03-16 NOTE — TELEPHONE ENCOUNTER
Called asking for report on MRI of lumbar spine done 2/22/2017,  And idea of what Dr. Tanner Wanted to do.  His note of 02/14/2017 said tmt would be based on MRI finding.  Meanwhile, pt. Has seen Dr. Mccrary for her elbow, and had him look at her MRI disc.   He recommended she try PT for back in conjunction with current PT for carpal tunnel.     She has had one treatment and already feels back and leg loosening up.  She would like to continue with PT and will call back if she wants to proceed with possible injection therapy mentioned by Dr. Tanner.

## 2017-03-16 NOTE — TELEPHONE ENCOUNTER
----- Message from Jamee Carlson sent at 3/14/2017  4:15 PM EDT -----  Contact: [phone]  PATIENT CALLING TO REPORT ON HER CONDITION.  GOING TO [place] TOM[place]ROW.      HAS TO STOP MOBIC AS SHE IS GOING TO HAVE EYE SURGERY.    PATIENT IS IN EPIC.

## 2017-03-28 ENCOUNTER — TRANSCRIBE ORDERS (OUTPATIENT)
Dept: ADMINISTRATIVE | Facility: HOSPITAL | Age: 77
End: 2017-03-28

## 2017-03-28 DIAGNOSIS — Z12.31 VISIT FOR SCREENING MAMMOGRAM: Primary | ICD-10-CM

## 2017-05-01 ENCOUNTER — TREATMENT (OUTPATIENT)
Dept: PHYSICAL THERAPY | Facility: CLINIC | Age: 77
End: 2017-05-01

## 2017-05-01 DIAGNOSIS — M54.5 CHRONIC LOW BACK PAIN, UNSPECIFIED BACK PAIN LATERALITY, WITH SCIATICA PRESENCE UNSPECIFIED: Primary | ICD-10-CM

## 2017-05-01 DIAGNOSIS — G89.29 CHRONIC LOW BACK PAIN, UNSPECIFIED BACK PAIN LATERALITY, WITH SCIATICA PRESENCE UNSPECIFIED: Primary | ICD-10-CM

## 2017-05-01 PROCEDURE — 97140 MANUAL THERAPY 1/> REGIONS: CPT | Performed by: PHYSICAL THERAPIST

## 2017-05-01 PROCEDURE — 97110 THERAPEUTIC EXERCISES: CPT | Performed by: PHYSICAL THERAPIST

## 2017-05-03 ENCOUNTER — TREATMENT (OUTPATIENT)
Dept: PHYSICAL THERAPY | Facility: CLINIC | Age: 77
End: 2017-05-03

## 2017-05-03 DIAGNOSIS — M54.5 CHRONIC LOW BACK PAIN, UNSPECIFIED BACK PAIN LATERALITY, WITH SCIATICA PRESENCE UNSPECIFIED: Primary | ICD-10-CM

## 2017-05-03 DIAGNOSIS — G89.29 CHRONIC LOW BACK PAIN, UNSPECIFIED BACK PAIN LATERALITY, WITH SCIATICA PRESENCE UNSPECIFIED: Primary | ICD-10-CM

## 2017-05-03 PROCEDURE — 97110 THERAPEUTIC EXERCISES: CPT | Performed by: PHYSICAL THERAPIST

## 2017-05-03 PROCEDURE — 97140 MANUAL THERAPY 1/> REGIONS: CPT | Performed by: PHYSICAL THERAPIST

## 2017-05-05 ENCOUNTER — APPOINTMENT (OUTPATIENT)
Dept: MAMMOGRAPHY | Facility: HOSPITAL | Age: 77
End: 2017-05-05
Attending: INTERNAL MEDICINE

## 2017-05-10 ENCOUNTER — TREATMENT (OUTPATIENT)
Dept: PHYSICAL THERAPY | Facility: CLINIC | Age: 77
End: 2017-05-10

## 2017-05-10 DIAGNOSIS — M54.5 CHRONIC LOW BACK PAIN, UNSPECIFIED BACK PAIN LATERALITY, WITH SCIATICA PRESENCE UNSPECIFIED: Primary | ICD-10-CM

## 2017-05-10 DIAGNOSIS — G89.29 CHRONIC LOW BACK PAIN, UNSPECIFIED BACK PAIN LATERALITY, WITH SCIATICA PRESENCE UNSPECIFIED: Primary | ICD-10-CM

## 2017-05-10 PROCEDURE — 97140 MANUAL THERAPY 1/> REGIONS: CPT | Performed by: PHYSICAL THERAPIST

## 2017-05-10 PROCEDURE — 97110 THERAPEUTIC EXERCISES: CPT | Performed by: PHYSICAL THERAPIST

## 2017-05-15 ENCOUNTER — TREATMENT (OUTPATIENT)
Dept: PHYSICAL THERAPY | Facility: CLINIC | Age: 77
End: 2017-05-15

## 2017-05-15 DIAGNOSIS — M54.5 CHRONIC LOW BACK PAIN, UNSPECIFIED BACK PAIN LATERALITY, WITH SCIATICA PRESENCE UNSPECIFIED: Primary | ICD-10-CM

## 2017-05-15 DIAGNOSIS — G89.29 CHRONIC LOW BACK PAIN, UNSPECIFIED BACK PAIN LATERALITY, WITH SCIATICA PRESENCE UNSPECIFIED: Primary | ICD-10-CM

## 2017-05-15 PROCEDURE — 97110 THERAPEUTIC EXERCISES: CPT | Performed by: PHYSICAL THERAPIST

## 2017-05-15 PROCEDURE — 97140 MANUAL THERAPY 1/> REGIONS: CPT | Performed by: PHYSICAL THERAPIST

## 2017-05-25 ENCOUNTER — TREATMENT (OUTPATIENT)
Dept: PHYSICAL THERAPY | Facility: CLINIC | Age: 77
End: 2017-05-25

## 2017-05-25 DIAGNOSIS — M54.5 CHRONIC LOW BACK PAIN, UNSPECIFIED BACK PAIN LATERALITY, WITH SCIATICA PRESENCE UNSPECIFIED: Primary | ICD-10-CM

## 2017-05-25 DIAGNOSIS — G89.29 CHRONIC LOW BACK PAIN, UNSPECIFIED BACK PAIN LATERALITY, WITH SCIATICA PRESENCE UNSPECIFIED: Primary | ICD-10-CM

## 2017-05-25 PROCEDURE — 97140 MANUAL THERAPY 1/> REGIONS: CPT | Performed by: PHYSICAL THERAPIST

## 2017-05-25 PROCEDURE — 97110 THERAPEUTIC EXERCISES: CPT | Performed by: PHYSICAL THERAPIST

## 2017-05-31 ENCOUNTER — TREATMENT (OUTPATIENT)
Dept: PHYSICAL THERAPY | Facility: CLINIC | Age: 77
End: 2017-05-31

## 2017-05-31 DIAGNOSIS — G89.29 CHRONIC LOW BACK PAIN, UNSPECIFIED BACK PAIN LATERALITY, WITH SCIATICA PRESENCE UNSPECIFIED: Primary | ICD-10-CM

## 2017-05-31 DIAGNOSIS — M54.5 CHRONIC LOW BACK PAIN, UNSPECIFIED BACK PAIN LATERALITY, WITH SCIATICA PRESENCE UNSPECIFIED: Primary | ICD-10-CM

## 2017-05-31 PROCEDURE — 97110 THERAPEUTIC EXERCISES: CPT | Performed by: PHYSICAL THERAPIST

## 2017-06-01 ENCOUNTER — TREATMENT (OUTPATIENT)
Dept: PHYSICAL THERAPY | Facility: CLINIC | Age: 77
End: 2017-06-01

## 2017-06-01 DIAGNOSIS — G89.29 CHRONIC LOW BACK PAIN, UNSPECIFIED BACK PAIN LATERALITY, WITH SCIATICA PRESENCE UNSPECIFIED: Primary | ICD-10-CM

## 2017-06-01 DIAGNOSIS — M54.5 CHRONIC LOW BACK PAIN, UNSPECIFIED BACK PAIN LATERALITY, WITH SCIATICA PRESENCE UNSPECIFIED: Primary | ICD-10-CM

## 2017-06-01 PROCEDURE — 97110 THERAPEUTIC EXERCISES: CPT | Performed by: PHYSICAL THERAPIST

## 2017-06-01 NOTE — PROGRESS NOTES
Physical Therapy Daily Progress Note    Subjective   Alanna Rodriges reports that she is doing good, she only has mild soreness from her visit yesterday and no c/o pain in the low back and posterior hip.     Objective   See Exercise, Manual, and Modality Logs for complete treatment.       Assessment/Plan     Pt is progressing well with therapy and she has responded very well to lumbar and hip stabilization activities recently.     Progress strengthening /stabilization /functional activity           Manual Therapy:         mins  00524;  Therapeutic Exercise:    58     mins  78626;     Neuromuscular Eloy:        mins  47434;    Therapeutic Activity:          mins  71275;     Gait Training:           mins  36256;     Ultrasound:          mins  67300;    Electrical Stimulation:         mins  56053 ( );  Dry Needling          mins self-pay    Timed Treatment:   58   mins   Total Treatment:     64   mins    Brody Leon, PT  Physical Therapist

## 2017-06-05 ENCOUNTER — APPOINTMENT (OUTPATIENT)
Dept: MAMMOGRAPHY | Facility: HOSPITAL | Age: 77
End: 2017-06-05
Attending: INTERNAL MEDICINE

## 2017-06-05 ENCOUNTER — TREATMENT (OUTPATIENT)
Dept: PHYSICAL THERAPY | Facility: CLINIC | Age: 77
End: 2017-06-05

## 2017-06-05 DIAGNOSIS — G89.29 CHRONIC LOW BACK PAIN, UNSPECIFIED BACK PAIN LATERALITY, WITH SCIATICA PRESENCE UNSPECIFIED: Primary | ICD-10-CM

## 2017-06-05 DIAGNOSIS — M54.5 CHRONIC LOW BACK PAIN, UNSPECIFIED BACK PAIN LATERALITY, WITH SCIATICA PRESENCE UNSPECIFIED: Primary | ICD-10-CM

## 2017-06-05 PROCEDURE — 97110 THERAPEUTIC EXERCISES: CPT | Performed by: PHYSICAL THERAPIST

## 2017-06-07 NOTE — PROGRESS NOTES
Physical Therapy Daily Progress Note    Subjective   Alanna Rodriges reports that she is feeling good, she has been a little sore with therapy but she has not been having as much pain in the low back/hip    Objective   See Exercise, Manual, and Modality Logs for complete treatment.       Assessment/Plan     Pt is progressing well with therapy, she demonstrates an improved understanding of her exercises and she is able to perform all activities in the clinic with only fatigue noted.     Progress strengthening /stabilization /functional activity           Manual Therapy:         mins  11334;  Therapeutic Exercise:    55     mins  64276;     Neuromuscular Eloy:        mins  35346;    Therapeutic Activity:          mins  25386;     Gait Training:           mins  86401;     Ultrasound:          mins  47657;    Electrical Stimulation:         mins  26168 ( );  Dry Needling          mins self-pay    Timed Treatment:   55   mins   Total Treatment:     58   mins    Brody Leon, PT  Physical Therapist

## 2017-06-14 ENCOUNTER — TREATMENT (OUTPATIENT)
Dept: PHYSICAL THERAPY | Facility: CLINIC | Age: 77
End: 2017-06-14

## 2017-06-14 DIAGNOSIS — G89.29 CHRONIC LOW BACK PAIN, UNSPECIFIED BACK PAIN LATERALITY, WITH SCIATICA PRESENCE UNSPECIFIED: Primary | ICD-10-CM

## 2017-06-14 DIAGNOSIS — M54.5 CHRONIC LOW BACK PAIN, UNSPECIFIED BACK PAIN LATERALITY, WITH SCIATICA PRESENCE UNSPECIFIED: Primary | ICD-10-CM

## 2017-06-14 PROCEDURE — 97110 THERAPEUTIC EXERCISES: CPT | Performed by: PHYSICAL THERAPIST

## 2017-06-16 NOTE — PROGRESS NOTES
Physical Therapy Daily Progress Note    Subjective   Alanna Rodriges reports that she has noticed a significant decrease in her pain level. She is able to work out independently without increasing pain as well.     Objective   See Exercise, Manual, and Modality Logs for complete treatment.       Assessment/Plan     Pt was given another handout for her HEP to continue to establish independence with her core and hip stabilization exercises. She is progressing very well with therapy     Progress strengthening /stabilization /functional activity           Manual Therapy:         mins  81553;  Therapeutic Exercise:    58     mins  87240;     Neuromuscular Eloy:        mins  31121;    Therapeutic Activity:          mins  49475;     Gait Training:           mins  62273;     Ultrasound:          mins  29195;    Electrical Stimulation:         mins  30322 ( );  Dry Needling          mins self-pay    Timed Treatment:   58   mins   Total Treatment:     62   mins    Brody Leon, PT  Physical Therapist

## 2017-06-21 ENCOUNTER — TREATMENT (OUTPATIENT)
Dept: PHYSICAL THERAPY | Facility: CLINIC | Age: 77
End: 2017-06-21

## 2017-06-21 DIAGNOSIS — G89.29 CHRONIC LOW BACK PAIN, UNSPECIFIED BACK PAIN LATERALITY, WITH SCIATICA PRESENCE UNSPECIFIED: Primary | ICD-10-CM

## 2017-06-21 DIAGNOSIS — M54.5 CHRONIC LOW BACK PAIN, UNSPECIFIED BACK PAIN LATERALITY, WITH SCIATICA PRESENCE UNSPECIFIED: Primary | ICD-10-CM

## 2017-06-21 PROCEDURE — 97110 THERAPEUTIC EXERCISES: CPT | Performed by: PHYSICAL THERAPIST

## 2017-06-28 ENCOUNTER — TREATMENT (OUTPATIENT)
Dept: PHYSICAL THERAPY | Facility: CLINIC | Age: 77
End: 2017-06-28

## 2017-06-28 DIAGNOSIS — G89.29 CHRONIC LOW BACK PAIN, UNSPECIFIED BACK PAIN LATERALITY, WITH SCIATICA PRESENCE UNSPECIFIED: Primary | ICD-10-CM

## 2017-06-28 DIAGNOSIS — M54.5 CHRONIC LOW BACK PAIN, UNSPECIFIED BACK PAIN LATERALITY, WITH SCIATICA PRESENCE UNSPECIFIED: Primary | ICD-10-CM

## 2017-06-28 PROCEDURE — 97110 THERAPEUTIC EXERCISES: CPT | Performed by: PHYSICAL THERAPIST

## 2017-06-30 ENCOUNTER — HOSPITAL ENCOUNTER (OUTPATIENT)
Dept: MAMMOGRAPHY | Facility: HOSPITAL | Age: 77
Discharge: HOME | End: 2017-06-30
Attending: INTERNAL MEDICINE | Admitting: INTERNAL MEDICINE

## 2017-06-30 DIAGNOSIS — Z12.31 VISIT FOR SCREENING MAMMOGRAM: ICD-10-CM

## 2017-06-30 PROCEDURE — G0202 SCR MAMMO BI INCL CAD: HCPCS | Performed by: RADIOLOGY

## 2017-06-30 PROCEDURE — 77063 BREAST TOMOSYNTHESIS BI: CPT | Performed by: RADIOLOGY

## 2017-06-30 PROCEDURE — 77063 BREAST TOMOSYNTHESIS BI: CPT

## 2017-06-30 PROCEDURE — G0202 SCR MAMMO BI INCL CAD: HCPCS

## 2017-06-30 NOTE — PROGRESS NOTES
Physical Therapy Daily Progress Note    Subjective   Alanna Rodriges reports that she had a little more pain than usual at the beginning of treatment but her pain level decreased with therapy today and she was mostly pain free at the end of treatment.     Objective   See Exercise, Manual, and Modality Logs for complete treatment.       Assessment/Plan     Pt has progressed very well with therapy, she demonstrates an improvement in her pain level and she continues to improve her understanding of the exercise program for lumbar and hip stabilization.     Progress per Plan of Care           Manual Therapy:         mins  76726;  Therapeutic Exercise:    64     mins  75865;     Neuromuscular Elyo:        mins  22176;    Therapeutic Activity:          mins  04132;     Gait Training:           mins  33635;     Ultrasound:          mins  27702;    Electrical Stimulation:         mins  38782 ( );  Dry Needling          mins self-pay    Timed Treatment:   64   mins   Total Treatment:     68   mins    Brody Leon, PT  Physical Therapist

## 2017-07-05 ENCOUNTER — TREATMENT (OUTPATIENT)
Dept: PHYSICAL THERAPY | Facility: CLINIC | Age: 77
End: 2017-07-05

## 2017-07-05 DIAGNOSIS — G89.29 CHRONIC LOW BACK PAIN, UNSPECIFIED BACK PAIN LATERALITY, WITH SCIATICA PRESENCE UNSPECIFIED: Primary | ICD-10-CM

## 2017-07-05 DIAGNOSIS — M54.5 CHRONIC LOW BACK PAIN, UNSPECIFIED BACK PAIN LATERALITY, WITH SCIATICA PRESENCE UNSPECIFIED: Primary | ICD-10-CM

## 2017-07-05 PROCEDURE — 97110 THERAPEUTIC EXERCISES: CPT | Performed by: PHYSICAL THERAPIST

## 2017-07-07 NOTE — PROGRESS NOTES
Physical Therapy Daily Progress Note    Subjective   Alanna Rodriges reports that she is doing very well, she went to [facility] this morning and she was able to complete all exercise without increasing her symptoms.     Objective   See Exercise, Manual, and Modality Logs for complete treatment.       Assessment/Plan     Focused more on resisted and dynamic stability exercise in the clinic today to supplement the patient's workout in [facility] this morning. Pt continues to have some fatigue with therapy but no pain is noted.     Progress per Plan of Care           Manual Therapy:         mins  08682;  Therapeutic Exercise:    64     mins  60294;     Neuromuscular Eloy:        mins  44801;    Therapeutic Activity:          mins  15923;     Gait Training:           mins  23935;     Ultrasound:          mins  21602;    Electrical Stimulation:         mins  63661 ( );  Dry Needling          mins self-pay    Timed Treatment:   64   mins   Total Treatment:     70   mins    Brody Leon, PT  Physical Therapist

## 2017-07-12 ENCOUNTER — TREATMENT (OUTPATIENT)
Dept: PHYSICAL THERAPY | Facility: CLINIC | Age: 77
End: 2017-07-12

## 2017-07-12 DIAGNOSIS — M54.5 CHRONIC LOW BACK PAIN, UNSPECIFIED BACK PAIN LATERALITY, WITH SCIATICA PRESENCE UNSPECIFIED: Primary | ICD-10-CM

## 2017-07-12 DIAGNOSIS — G89.29 CHRONIC LOW BACK PAIN, UNSPECIFIED BACK PAIN LATERALITY, WITH SCIATICA PRESENCE UNSPECIFIED: Primary | ICD-10-CM

## 2017-07-12 PROCEDURE — 97110 THERAPEUTIC EXERCISES: CPT | Performed by: PHYSICAL THERAPIST

## 2017-07-12 PROCEDURE — 97140 MANUAL THERAPY 1/> REGIONS: CPT | Performed by: PHYSICAL THERAPIST

## 2017-07-13 NOTE — PROGRESS NOTES
Physical Therapy Daily Progress Note    Subjective   Alanna Rordiges reports having a little more pain than usual in her low back today, she feels like it is still better than it was before startin therapy     Objective   See Exercise, Manual, and Modality Logs for complete treatment.     Posterior rotation of the right innominate noted today - MET for correction of pelvic obliquity performed      Assessment/Plan     Pt responded very well to manual therapy and she reported a decrease in her symptoms intensity post MET. She had some decrease in her intensity with stretching but complete alleviation with manual correction.     Progress per Plan of Care and Progress strengthening /stabilization /functional activity           Manual Therapy:    13     mins  93028;  Therapeutic Exercise:    45     mins  37569;     Neuromuscular Eloy:        mins  25951;    Therapeutic Activity:          mins  94865;     Gait Training:           mins  85929;     Ultrasound:          mins  20987;    Electrical Stimulation:         mins  18680 ( );  Dry Needling          mins self-pay    Timed Treatment:   58   mins   Total Treatment:     62   mins    Brody Leon, PT  Physical Therapist

## 2017-07-21 DIAGNOSIS — G56.01 CARPAL TUNNEL SYNDROME OF RIGHT WRIST: ICD-10-CM

## 2017-07-21 DIAGNOSIS — M51.36 DEGENERATIVE DISC DISEASE, LUMBAR: ICD-10-CM

## 2017-07-21 RX ORDER — MELOXICAM 7.5 MG/1
7.5 TABLET ORAL 2 TIMES DAILY
Qty: 60 TABLET | Refills: 0 | Status: SHIPPED | OUTPATIENT
Start: 2017-07-21 | End: 2018-01-04

## 2017-07-21 NOTE — TELEPHONE ENCOUNTER
Provider:  Hermann  Caller: Alanna   Phone #:  [phone]    Last visit:   2/28/17  Next visit: n/a    PARAMJIT:         Reason for call:       meloxicam refill

## 2017-08-02 ENCOUNTER — TREATMENT (OUTPATIENT)
Dept: PHYSICAL THERAPY | Facility: CLINIC | Age: 77
End: 2017-08-02

## 2017-08-02 DIAGNOSIS — G89.29 CHRONIC LOW BACK PAIN, UNSPECIFIED BACK PAIN LATERALITY, WITH SCIATICA PRESENCE UNSPECIFIED: Primary | ICD-10-CM

## 2017-08-02 DIAGNOSIS — M54.5 CHRONIC LOW BACK PAIN, UNSPECIFIED BACK PAIN LATERALITY, WITH SCIATICA PRESENCE UNSPECIFIED: Primary | ICD-10-CM

## 2017-08-02 PROCEDURE — 97110 THERAPEUTIC EXERCISES: CPT | Performed by: PHYSICAL THERAPIST

## 2017-08-04 NOTE — PROGRESS NOTES
"   Physical Therapy Daily Progress Note    Subjective   Alanna Rodriges reports that she is feeling good, she still has some pain every now and then but she feels like that is just normal for her. Overall she feels much better and that she has improved with therapy     Objective   See Exercise, Manual, and Modality Logs for complete treatment.       Assessment/Plan     Pt is progressing well with therapy, she was given and \"every day\" HEP today with stretching and basic exercises, will focus on more dynamic and challenging activities at next visit in anticipation of DC    Anticipate DC next Visit           Manual Therapy:         mins  83664;  Therapeutic Exercise:    57     mins  62398;     Neuromuscular Eloy:        mins  56388;    Therapeutic Activity:          mins  36605;     Gait Training:           mins  46451;     Ultrasound:          mins  24919;    Electrical Stimulation:         mins  85866 ( );  Dry Needling          mins self-pay    Timed Treatment:   57   mins   Total Treatment:     60   mins    Brody Leon, PT  Physical Therapist  "

## 2017-08-09 ENCOUNTER — TREATMENT (OUTPATIENT)
Dept: PHYSICAL THERAPY | Facility: CLINIC | Age: 77
End: 2017-08-09

## 2017-08-09 DIAGNOSIS — G89.29 CHRONIC LOW BACK PAIN, UNSPECIFIED BACK PAIN LATERALITY, WITH SCIATICA PRESENCE UNSPECIFIED: Primary | ICD-10-CM

## 2017-08-09 DIAGNOSIS — M54.5 CHRONIC LOW BACK PAIN, UNSPECIFIED BACK PAIN LATERALITY, WITH SCIATICA PRESENCE UNSPECIFIED: Primary | ICD-10-CM

## 2017-08-09 PROCEDURE — 97110 THERAPEUTIC EXERCISES: CPT | Performed by: PHYSICAL THERAPIST

## 2017-08-11 NOTE — PROGRESS NOTES
Physical Therapy Daily Progress Note    Subjective   Alanna Rodriges reports that she is feeling much better overall, she has less pain in the low back and she is able to do all her usual exercises without exacerbation    Objective   See Exercise, Manual, and Modality Logs for complete treatment.       Assessment/Plan     Pt has demonstrated a good understanding of her exercise program and she is able to do all activity in the clinic without exacerbation. She is appropriate for DC at this time     Other D/C           Manual Therapy:         mins  96759;  Therapeutic Exercise:    58     mins  18270;     Neuromuscular Eloy:        mins  91751;    Therapeutic Activity:          mins  80476;     Gait Training:           mins  01116;     Ultrasound:          mins  36983;    Electrical Stimulation:         mins  35680 ( );  Dry Needling          mins self-pay    Timed Treatment:   58   mins   Total Treatment:     60   mins    Brody Leon, PT  Physical Therapist

## 2017-10-20 ENCOUNTER — LAB REQUISITION (OUTPATIENT)
Dept: LAB | Facility: HOSPITAL | Age: 77
End: 2017-10-20

## 2017-10-20 DIAGNOSIS — K21.9 GASTRO-ESOPHAGEAL REFLUX DISEASE WITHOUT ESOPHAGITIS: ICD-10-CM

## 2017-10-20 PROCEDURE — 88305 TISSUE EXAM BY PATHOLOGIST: CPT | Performed by: INTERNAL MEDICINE

## 2017-10-23 LAB
CYTO UR: NORMAL
LAB AP CASE REPORT: NORMAL
LAB AP CLINICAL INFORMATION: NORMAL
Lab: NORMAL
PATH REPORT.FINAL DX SPEC: NORMAL
PATH REPORT.GROSS SPEC: NORMAL

## 2017-12-15 ENCOUNTER — TELEPHONE (OUTPATIENT)
Dept: PAIN MEDICINE | Facility: CLINIC | Age: 77
End: 2017-12-15

## 2018-01-02 ENCOUNTER — OFFICE VISIT (OUTPATIENT)
Dept: NEUROSURGERY | Facility: CLINIC | Age: 78
End: 2018-01-02

## 2018-01-02 ENCOUNTER — PREP FOR SURGERY (OUTPATIENT)
Dept: OTHER | Facility: HOSPITAL | Age: 78
End: 2018-01-02

## 2018-01-02 VITALS
WEIGHT: 143.2 LBS | SYSTOLIC BLOOD PRESSURE: 120 MMHG | DIASTOLIC BLOOD PRESSURE: 72 MMHG | BODY MASS INDEX: 25.37 KG/M2 | HEIGHT: 63 IN | TEMPERATURE: 98.1 F

## 2018-01-02 DIAGNOSIS — G56.01 CARPAL TUNNEL SYNDROME OF RIGHT WRIST: Primary | ICD-10-CM

## 2018-01-02 PROBLEM — G56.00 CTS (CARPAL TUNNEL SYNDROME): Status: ACTIVE | Noted: 2018-01-02

## 2018-01-02 PROCEDURE — 99213 OFFICE O/P EST LOW 20 MIN: CPT | Performed by: PHYSICIAN ASSISTANT

## 2018-01-02 RX ORDER — SODIUM CHLORIDE, SODIUM LACTATE, POTASSIUM CHLORIDE, CALCIUM CHLORIDE 600; 310; 30; 20 MG/100ML; MG/100ML; MG/100ML; MG/100ML
100 INJECTION, SOLUTION INTRAVENOUS CONTINUOUS
Status: CANCELLED | OUTPATIENT
Start: 2018-01-02

## 2018-01-02 RX ORDER — CEFAZOLIN SODIUM 2 G/100ML
2 INJECTION, SOLUTION INTRAVENOUS ONCE
Status: CANCELLED | OUTPATIENT
Start: 2018-01-02 | End: 2018-01-02

## 2018-01-02 RX ORDER — HYDROCODONE BITARTRATE AND ACETAMINOPHEN 7.5; 325 MG/1; MG/1
1 TABLET ORAL ONCE
Status: CANCELLED | OUTPATIENT
Start: 2018-01-02 | End: 2018-01-02

## 2018-01-02 RX ORDER — ACETAMINOPHEN 325 MG/1
650 TABLET ORAL ONCE
Status: CANCELLED | OUTPATIENT
Start: 2018-01-02 | End: 2018-01-02

## 2018-01-02 RX ORDER — IBUPROFEN 800 MG/1
800 TABLET ORAL ONCE
Status: CANCELLED | OUTPATIENT
Start: 2018-01-02 | End: 2018-01-02

## 2018-01-02 NOTE — PROGRESS NOTES
Subjective   Patient ID: Alanna Rodriges is a 77 y.o. female  [id number]    Chief Complaint   Patient presents with   • carpal tunnel     right       Patient Active Problem List   Diagnosis   • DDD (degenerative disc disease), lumbar   • Primary osteoarthritis of right hip   • History of ulcerative colitis   • Spondylolisthesis of lumbosacral region, L5-S1   • Lumbar facet arthropathy   • Neuroforaminal stenosis of spine   • Leg length discrepancy   • Ulnar neuropathy at elbow of right upper extremity       History of Present Illness  Pt. Presents with numbness and tingling in the right thumb and index finger, she has significant atrophy on the thenar area and weakness in her pinch. She had nerve conduction studies done in November 2016.    Past Medical History:   Diagnosis Date   • Anemia    • Anxiety disorder    • Arthritis    • Cataract    • Depression    • Dermatitis    • DVT (deep venous thrombosis)     1975   • Elbow pain    • GERD (gastroesophageal reflux disease)    • Hepatitis C    • History of blood transfusion    • History of hepatitis C virus infection    • History of migraine    • Hypothyroidism    • Kidney stones    • Migraine     occular   • PONV (postoperative nausea and vomiting)    • Ptosis due to aging    • Seborrhea    • Seizure 1975    during hospitalization as a side effect of Tofranil    • Shoulder pain     right   • Spinal headache      Allergies   Allergen Reactions   • Codeine Nausea Only   • Morphine And Related Hallucinations     After 3 days       Current Outpatient Prescriptions:   •  ALPRAZolam (XANAX) 0.25 MG tablet, Take 0.25 mg by mouth As Needed for anxiety., Disp: , Rfl:   •  aspirin 81 MG chewable tablet, Chew 81 mg Daily. Last dose 11/28/2016, Disp: , Rfl:   •  Biotin (BIOTIN MAXIMUM STRENGTH) 10 MG tablet, Take  by mouth 2 (Two) Times a Day. Stopped one week prior to surgery, Disp: , Rfl:   •  Cholecalciferol (D3-1000) 1000 UNITS capsule, Take 1 capsule by mouth 2 (Two) Times a  Day. Stopped one week prior to surgery, Disp: , Rfl:   •  dicyclomine (BENTYL) 10 MG capsule, Take 10 mg by mouth Every Night., Disp: , Rfl:   •  estrogens, conjugated, (PREMARIN) 0.625 MG tablet, Take 0.625 mg by mouth Daily., Disp: , Rfl:   •  ketoconazole (NIZORAL) 2 % cream, Apply 1 application topically Daily. Shampoo weekly, Disp: , Rfl:   •  ketoconazole (NIZORAL) 2 % shampoo, Apply  topically 1 (One) Time Per Week., Disp: , Rfl:   •  levothyroxine (SYNTHROID, LEVOTHROID) 75 MCG tablet, Take 75 mcg by mouth Daily., Disp: , Rfl:   •  meloxicam (MOBIC) 7.5 MG tablet, Take 1 tablet by mouth 2 (Two) Times a Day., Disp: 60 tablet, Rfl: 0  •  Omega-3 Fatty Acids (FISH OIL PO), Take  by mouth 2 (Two) Times a Day. Stopped one week prior to surgery, Disp: , Rfl:   •  omeprazole (priLOSEC) 40 MG capsule, Take 40 mg by mouth Daily., Disp: , Rfl:   •  polyethyl glycol-propyl glycol (SYSTANE) 0.4-0.3 % solution ophthalmic solution, 1 drop 3 (Three) Times a Day., Disp: , Rfl:   •  RABEprazole (ACIPHEX) 20 MG EC tablet, Take 20 mg by mouth Daily., Disp: , Rfl:       Review of Systems   Constitutional: Negative for activity change, appetite change, chills, diaphoresis, fatigue, fever and unexpected weight change.   HENT: Negative for congestion, dental problem, drooling, ear discharge, ear pain, facial swelling, hearing loss, mouth sores, nosebleeds, postnasal drip, rhinorrhea, sinus pressure, sneezing, sore throat, tinnitus, trouble swallowing and voice change.    Eyes: Negative for photophobia, pain, discharge, redness, itching and visual disturbance.   Respiratory: Positive for shortness of breath. Negative for apnea, cough, choking, chest tightness, wheezing and stridor.    Cardiovascular: Negative for chest pain, palpitations and leg swelling.   Gastrointestinal: Positive for abdominal pain and diarrhea. Negative for abdominal distention, anal bleeding, blood in stool, constipation, nausea, rectal pain and vomiting.  "  Endocrine: Positive for cold intolerance. Negative for heat intolerance, polydipsia, polyphagia and polyuria.   Genitourinary: Negative for decreased urine volume, difficulty urinating, dysuria, enuresis, flank pain, frequency, genital sores, hematuria and urgency.   Musculoskeletal: Positive for arthralgias, back pain, gait problem and neck pain. Negative for joint swelling, myalgias and neck stiffness.   Skin: Negative for color change, pallor, rash and wound.   Allergic/Immunologic: Negative for environmental allergies, food allergies and immunocompromised state.   Neurological: Positive for numbness. Negative for dizziness, tremors, seizures, syncope, facial asymmetry, speech difficulty, weakness, light-headedness and headaches.   Hematological: Negative for adenopathy. Does not bruise/bleed easily.   Psychiatric/Behavioral: Positive for decreased concentration. Negative for agitation, behavioral problems, confusion, dysphoric mood, hallucinations, self-injury, sleep disturbance and suicidal ideas. The patient is nervous/anxious. The patient is not hyperactive.      Physical Exam   Constitutional: She is oriented to person, place, and time. She appears well-developed and well-nourished.   HENT:   Head: Normocephalic.   Eyes: Conjunctivae are normal.   Neck: Normal range of motion.   Cardiovascular: Normal rate.    Pulmonary/Chest: Effort normal.   Neurological: She is alert and oriented to person, place, and time. She has normal reflexes. Gait normal.   Skin: Skin is warm and dry. There is erythema. There is pallor.   Psychiatric: She has a normal mood and affect. Her speech is normal and behavior is normal. Judgment and thought content normal.     /72  Temp 98.1 °F (36.7 °C)  Ht 160 cm (62.99\")  Wt 65 kg (143 lb 3.2 oz)  BMI 25.37 kg/m2    Neurologic Exam     Mental Status   Oriented to person, place, and time.   Attention: normal.   Speech: speech is normal   Level of consciousness: " alert  Knowledge: good.     Motor Exam   Muscle bulk: normal  Overall muscle tone: normal    Sensory Exam        Sensory alteration in the right thumb and index finger     Gait, Coordination, and Reflexes     Gait  Gait: normal    Tremor   Resting tremor: absent  Intention tremor: absent  Action tremor: absent    Medical Decision Making:   The patient is having significant symptoms associated with median nerve compression with atrophy of the thenar eminence of the right hand.  Her pinch strength is still intact however due to the numbness in her thumb and index finger she has difficulties picking up items.  With the findings on examination we have recommended median nerve release in an effort to maintain her strength and improve her pain.  The patient wishes to proceed with surgery.  The risk and benefits and reasoning the surgery were discussed and all of her questions were answered.  Our plan will be for surgery this Friday on January 5.    Sherice Carrington PA-C

## 2018-01-04 RX ORDER — MELATONIN
1000 DAILY
COMMUNITY
End: 2022-08-10

## 2018-01-05 ENCOUNTER — ANESTHESIA EVENT (OUTPATIENT)
Dept: PERIOP | Facility: HOSPITAL | Age: 78
End: 2018-01-05

## 2018-01-05 ENCOUNTER — HOSPITAL ENCOUNTER (OUTPATIENT)
Facility: HOSPITAL | Age: 78
Setting detail: HOSPITAL OUTPATIENT SURGERY
Discharge: HOME | End: 2018-01-05
Attending: NEUROLOGICAL SURGERY | Admitting: NEUROLOGICAL SURGERY

## 2018-01-05 ENCOUNTER — ANESTHESIA (OUTPATIENT)
Dept: PERIOP | Facility: HOSPITAL | Age: 78
End: 2018-01-05

## 2018-01-05 VITALS
HEIGHT: 65 IN | BODY MASS INDEX: 23.32 KG/M2 | DIASTOLIC BLOOD PRESSURE: 60 MMHG | TEMPERATURE: 98.1 F | WEIGHT: 140 LBS | HEART RATE: 72 BPM | OXYGEN SATURATION: 96 % | SYSTOLIC BLOOD PRESSURE: 120 MMHG | RESPIRATION RATE: 16 BRPM

## 2018-01-05 LAB — POTASSIUM BLD-SCNC: 4.1 MMOL/L (ref 3.5–5.5)

## 2018-01-05 PROCEDURE — 25010000002 ONDANSETRON PER 1 MG: Performed by: NURSE ANESTHETIST, CERTIFIED REGISTERED

## 2018-01-05 PROCEDURE — 25010000002 PROPOFOL 10 MG/ML EMULSION: Performed by: NURSE ANESTHETIST, CERTIFIED REGISTERED

## 2018-01-05 PROCEDURE — 25010000002 DEXAMETHASONE PER 1 MG: Performed by: NURSE ANESTHETIST, CERTIFIED REGISTERED

## 2018-01-05 PROCEDURE — 25010000002 PROPOFOL 1000 MG/ML EMULSION: Performed by: NURSE ANESTHETIST, CERTIFIED REGISTERED

## 2018-01-05 PROCEDURE — 84132 ASSAY OF SERUM POTASSIUM: CPT | Performed by: NEUROLOGICAL SURGERY

## 2018-01-05 PROCEDURE — 64721 CARPAL TUNNEL SURGERY: CPT | Performed by: NEUROLOGICAL SURGERY

## 2018-01-05 RX ORDER — PROPOFOL 10 MG/ML
VIAL (ML) INTRAVENOUS AS NEEDED
Status: DISCONTINUED | OUTPATIENT
Start: 2018-01-05 | End: 2018-01-05 | Stop reason: SURG

## 2018-01-05 RX ORDER — SODIUM CHLORIDE 0.9 % (FLUSH) 0.9 %
1-10 SYRINGE (ML) INJECTION AS NEEDED
Status: DISCONTINUED | OUTPATIENT
Start: 2018-01-05 | End: 2018-01-05 | Stop reason: HOSPADM

## 2018-01-05 RX ORDER — IBUPROFEN 800 MG/1
800 TABLET ORAL ONCE
Status: COMPLETED | OUTPATIENT
Start: 2018-01-05 | End: 2018-01-05

## 2018-01-05 RX ORDER — FAMOTIDINE 20 MG/1
20 TABLET, FILM COATED ORAL ONCE
Status: COMPLETED | OUTPATIENT
Start: 2018-01-05 | End: 2018-01-05

## 2018-01-05 RX ORDER — FENTANYL CITRATE 50 UG/ML
50 INJECTION, SOLUTION INTRAMUSCULAR; INTRAVENOUS
Status: DISCONTINUED | OUTPATIENT
Start: 2018-01-05 | End: 2018-01-05 | Stop reason: HOSPADM

## 2018-01-05 RX ORDER — FAMOTIDINE 10 MG/ML
20 INJECTION, SOLUTION INTRAVENOUS ONCE
Status: DISCONTINUED | OUTPATIENT
Start: 2018-01-05 | End: 2018-01-05 | Stop reason: HOSPADM

## 2018-01-05 RX ORDER — CEFAZOLIN SODIUM 2 G/100ML
2 INJECTION, SOLUTION INTRAVENOUS ONCE
Status: DISCONTINUED | OUTPATIENT
Start: 2018-01-05 | End: 2018-01-05 | Stop reason: HOSPADM

## 2018-01-05 RX ORDER — MAGNESIUM HYDROXIDE 1200 MG/15ML
LIQUID ORAL AS NEEDED
Status: DISCONTINUED | OUTPATIENT
Start: 2018-01-05 | End: 2018-01-05 | Stop reason: HOSPADM

## 2018-01-05 RX ORDER — SODIUM CHLORIDE, SODIUM LACTATE, POTASSIUM CHLORIDE, CALCIUM CHLORIDE 600; 310; 30; 20 MG/100ML; MG/100ML; MG/100ML; MG/100ML
9 INJECTION, SOLUTION INTRAVENOUS CONTINUOUS
Status: DISCONTINUED | OUTPATIENT
Start: 2018-01-05 | End: 2018-01-05 | Stop reason: HOSPADM

## 2018-01-05 RX ORDER — ONDANSETRON 2 MG/ML
INJECTION INTRAMUSCULAR; INTRAVENOUS AS NEEDED
Status: DISCONTINUED | OUTPATIENT
Start: 2018-01-05 | End: 2018-01-05 | Stop reason: SURG

## 2018-01-05 RX ORDER — OXYCODONE HYDROCHLORIDE AND ACETAMINOPHEN 5; 325 MG/1; MG/1
1 TABLET ORAL EVERY 4 HOURS PRN
Qty: 30 TABLET | Refills: 0 | Status: SHIPPED | OUTPATIENT
Start: 2018-01-05 | End: 2018-01-11

## 2018-01-05 RX ORDER — ACETAMINOPHEN 325 MG/1
650 TABLET ORAL ONCE
Status: COMPLETED | OUTPATIENT
Start: 2018-01-05 | End: 2018-01-05

## 2018-01-05 RX ORDER — LIDOCAINE HYDROCHLORIDE 10 MG/ML
0.5 INJECTION, SOLUTION EPIDURAL; INFILTRATION; INTRACAUDAL; PERINEURAL ONCE AS NEEDED
Status: COMPLETED | OUTPATIENT
Start: 2018-01-05 | End: 2018-01-05

## 2018-01-05 RX ORDER — TRAMADOL HYDROCHLORIDE 50 MG/1
50 TABLET ORAL EVERY 4 HOURS
Qty: 45 TABLET | Refills: 2 | Status: SHIPPED | OUTPATIENT
Start: 2018-01-05 | End: 2018-01-11

## 2018-01-05 RX ORDER — BUPIVACAINE HYDROCHLORIDE 5 MG/ML
INJECTION, SOLUTION EPIDURAL; INTRACAUDAL AS NEEDED
Status: DISCONTINUED | OUTPATIENT
Start: 2018-01-05 | End: 2018-01-05 | Stop reason: HOSPADM

## 2018-01-05 RX ORDER — HYDROCODONE BITARTRATE AND ACETAMINOPHEN 7.5; 325 MG/1; MG/1
1 TABLET ORAL ONCE
Status: COMPLETED | OUTPATIENT
Start: 2018-01-05 | End: 2018-01-05

## 2018-01-05 RX ORDER — ONDANSETRON 2 MG/ML
4 INJECTION INTRAMUSCULAR; INTRAVENOUS ONCE AS NEEDED
Status: DISCONTINUED | OUTPATIENT
Start: 2018-01-05 | End: 2018-01-05 | Stop reason: HOSPADM

## 2018-01-05 RX ORDER — LIDOCAINE HYDROCHLORIDE 10 MG/ML
INJECTION, SOLUTION EPIDURAL; INFILTRATION; INTRACAUDAL; PERINEURAL AS NEEDED
Status: DISCONTINUED | OUTPATIENT
Start: 2018-01-05 | End: 2018-01-05 | Stop reason: SURG

## 2018-01-05 RX ORDER — HYDROMORPHONE HYDROCHLORIDE 1 MG/ML
0.5 INJECTION, SOLUTION INTRAMUSCULAR; INTRAVENOUS; SUBCUTANEOUS
Status: DISCONTINUED | OUTPATIENT
Start: 2018-01-05 | End: 2018-01-05 | Stop reason: HOSPADM

## 2018-01-05 RX ORDER — DEXAMETHASONE SODIUM PHOSPHATE 4 MG/ML
INJECTION, SOLUTION INTRA-ARTICULAR; INTRALESIONAL; INTRAMUSCULAR; INTRAVENOUS; SOFT TISSUE AS NEEDED
Status: DISCONTINUED | OUTPATIENT
Start: 2018-01-05 | End: 2018-01-05 | Stop reason: SURG

## 2018-01-05 RX ORDER — SODIUM CHLORIDE, SODIUM LACTATE, POTASSIUM CHLORIDE, CALCIUM CHLORIDE 600; 310; 30; 20 MG/100ML; MG/100ML; MG/100ML; MG/100ML
100 INJECTION, SOLUTION INTRAVENOUS CONTINUOUS
Status: DISCONTINUED | OUTPATIENT
Start: 2018-01-05 | End: 2018-01-05 | Stop reason: HOSPADM

## 2018-01-05 RX ADMIN — LIDOCAINE HYDROCHLORIDE 50 MG: 10 INJECTION, SOLUTION EPIDURAL; INFILTRATION; INTRACAUDAL; PERINEURAL at 07:09

## 2018-01-05 RX ADMIN — DEXAMETHASONE SODIUM PHOSPHATE 4 MG: 4 INJECTION, SOLUTION INTRAMUSCULAR; INTRAVENOUS at 07:21

## 2018-01-05 RX ADMIN — SODIUM CHLORIDE, POTASSIUM CHLORIDE, SODIUM LACTATE AND CALCIUM CHLORIDE 100 ML/HR: 600; 310; 30; 20 INJECTION, SOLUTION INTRAVENOUS at 06:43

## 2018-01-05 RX ADMIN — HYDROCODONE BITARTRATE AND ACETAMINOPHEN 1 TABLET: 7.5; 325 TABLET ORAL at 06:42

## 2018-01-05 RX ADMIN — PROPOFOL 60 MG: 10 INJECTION, EMULSION INTRAVENOUS at 07:09

## 2018-01-05 RX ADMIN — LIDOCAINE HYDROCHLORIDE 0.5 ML: 10 INJECTION, SOLUTION EPIDURAL; INFILTRATION; INTRACAUDAL; PERINEURAL at 06:40

## 2018-01-05 RX ADMIN — EPHEDRINE SULFATE 15 MG: 50 INJECTION INTRAMUSCULAR; INTRAVENOUS; SUBCUTANEOUS at 07:25

## 2018-01-05 RX ADMIN — FAMOTIDINE 20 MG: 20 TABLET, FILM COATED ORAL at 06:40

## 2018-01-05 RX ADMIN — ONDANSETRON 4 MG: 2 INJECTION INTRAMUSCULAR; INTRAVENOUS at 07:21

## 2018-01-05 RX ADMIN — ACETAMINOPHEN 650 MG: 325 TABLET ORAL at 06:42

## 2018-01-05 RX ADMIN — PROPOFOL 77 MCG/KG/MIN: 10 INJECTION, EMULSION INTRAVENOUS at 07:09

## 2018-01-05 RX ADMIN — IBUPROFEN 800 MG: 800 TABLET ORAL at 06:42

## 2018-01-05 NOTE — INTERVAL H&P NOTE
"Pre-Op H&P (See Recent Office Note Attached for Full H&P)    Chief complaint: Right hand numbness    Review of Systems:  General ROS:  no fever, chills, rashes, No change since last office visit  Cardiovascular ROS: no chest pain or dyspnea on exertion  Respiratory ROS: no cough, shortness of breath, or wheezing    Immunization History:   Influenza:  Yes 2017  Pneumococcal:  Yes < 5 years  Tetanus:  unknown    Meds:    No current facility-administered medications on file prior to encounter.      Current Outpatient Prescriptions on File Prior to Encounter   Medication Sig Dispense Refill   • dicyclomine (BENTYL) 10 MG capsule Take 10 mg by mouth Every Night.     • estrogens, conjugated, (PREMARIN) 0.625 MG tablet Take 0.625 mg by mouth Daily.     • ketoconazole (NIZORAL) 2 % cream Apply 1 application topically Every 12 (Twelve) Hours. Shampoo weekly     • levothyroxine (SYNTHROID, LEVOTHROID) 75 MCG tablet Take 75 mcg by mouth Daily.     • RABEprazole (ACIPHEX) 20 MG EC tablet Take 20 mg by mouth Daily.     • ALPRAZolam (XANAX) 0.25 MG tablet Take 0.25 mg by mouth As Needed for anxiety.     • aspirin 81 MG chewable tablet Chew 81 mg Daily. Last dose 11/28/2016     • ketoconazole (NIZORAL) 2 % shampoo Apply  topically 1 (One) Time Per Week.     • Omega-3 Fatty Acids (FISH OIL PO) Take 1 capsule by mouth 2 (Two) Times a Day. Stopped one week prior to surgery         Vital Signs:  /73 (BP Location: Right arm, Patient Position: Lying)  Pulse 72  Temp 98.4 °F (36.9 °C) (Temporal Artery )   Ht 165.1 cm (65\")  Wt 63.5 kg (140 lb)  SpO2 95%  Breastfeeding? No  BMI 23.3 kg/m2    Physical Exam:    CV:  S1S2 regular rate and rhythm, no murmur               Resp:  Clear to auscultation; respirations regular, even and unlabored    Results Review:    I reviewed the patient's new clinical results.    Cancer Staging (if applicable)  Cancer Patient: __ yes _x_no __unknown; If yes, clinical stage T:__ N:__M:__, stage " group or __N/A    Assessment/Plan:    Right hand CTS - Right carpal tunnel release    Kelly Mckeon, APRN  1/5/2018   6:31 AM

## 2018-01-05 NOTE — ANESTHESIA PREPROCEDURE EVALUATION
Anesthesia Evaluation     Patient summary reviewed and Nursing notes reviewed   history of anesthetic complications: PONV  NPO Solid Status: > 8 hours  NPO Liquid Status: > 8 hours     Airway   Mallampati: II  TM distance: >3 FB  Neck ROM: full  no difficulty expected  Dental      Pulmonary - negative pulmonary ROS and normal exam   Cardiovascular - normal exam    (+) DVT,       Neuro/Psych  (+) seizures, headaches, numbness, psychiatric history,     GI/Hepatic/Renal/Endo    (+)  GERD, hepatitis, liver disease, hypothyroidism,     Musculoskeletal     Abdominal    Substance History      OB/GYN          Other   (+) arthritis                                             Anesthesia Plan    ASA 2     Araceli block     intravenous induction   Anesthetic plan and risks discussed with patient.    Plan discussed with CRNA.

## 2018-01-05 NOTE — ANESTHESIA POSTPROCEDURE EVALUATION
Patient: Alanna Rodriges    Procedure Summary     Date Anesthesia Start Anesthesia Stop Room / Location    01/05/18 0703 0743  [facility] / [facility]       Procedure Diagnosis Surgeon Provider    CARPAL TUNNEL RELEASE RIGHT  (Right Wrist) Carpal tunnel syndrome of right wrist  (Carpal tunnel syndrome of right wrist [G56.01]) MD Travis Vitcoria MD          Anesthesia Type: Araceli block  Last vitals  BP   116/58   Temp   98.6   Pulse   79   Resp      16   SpO2   97     Post Anesthesia Care and Evaluation    Patient location during evaluation: PACU  Patient participation: complete - patient participated  Level of consciousness: awake and alert  Pain score: 0  Pain management: adequate  Airway patency: patent  Anesthetic complications: No anesthetic complications  PONV Status: none  Cardiovascular status: hemodynamically stable and acceptable  Respiratory status: nonlabored ventilation, acceptable and nasal cannula  Hydration status: acceptable

## 2018-01-05 NOTE — H&P (VIEW-ONLY)
Subjective   Patient ID: Alanna Rodriges is a 77 y.o. female  [id number]    Chief Complaint   Patient presents with   • carpal tunnel     right       Patient Active Problem List   Diagnosis   • DDD (degenerative disc disease), lumbar   • Primary osteoarthritis of right hip   • History of ulcerative colitis   • Spondylolisthesis of lumbosacral region, L5-S1   • Lumbar facet arthropathy   • Neuroforaminal stenosis of spine   • Leg length discrepancy   • Ulnar neuropathy at elbow of right upper extremity       History of Present Illness  Pt. Presents with numbness and tingling in the right thumb and index finger, she has significant atrophy on the thenar area and weakness in her pinch. She had nerve conduction studies done in November 2016.    Past Medical History:   Diagnosis Date   • Anemia    • Anxiety disorder    • Arthritis    • Cataract    • Depression    • Dermatitis    • DVT (deep venous thrombosis)     1975   • Elbow pain    • GERD (gastroesophageal reflux disease)    • Hepatitis C    • History of blood transfusion    • History of hepatitis C virus infection    • History of migraine    • Hypothyroidism    • Kidney stones    • Migraine     occular   • PONV (postoperative nausea and vomiting)    • Ptosis due to aging    • Seborrhea    • Seizure 1975    during hospitalization as a side effect of Tofranil    • Shoulder pain     right   • Spinal headache      Allergies   Allergen Reactions   • Codeine Nausea Only   • Morphine And Related Hallucinations     After 3 days       Current Outpatient Prescriptions:   •  ALPRAZolam (XANAX) 0.25 MG tablet, Take 0.25 mg by mouth As Needed for anxiety., Disp: , Rfl:   •  aspirin 81 MG chewable tablet, Chew 81 mg Daily. Last dose 11/28/2016, Disp: , Rfl:   •  Biotin (BIOTIN MAXIMUM STRENGTH) 10 MG tablet, Take  by mouth 2 (Two) Times a Day. Stopped one week prior to surgery, Disp: , Rfl:   •  Cholecalciferol (D3-1000) 1000 UNITS capsule, Take 1 capsule by mouth 2 (Two) Times a  Day. Stopped one week prior to surgery, Disp: , Rfl:   •  dicyclomine (BENTYL) 10 MG capsule, Take 10 mg by mouth Every Night., Disp: , Rfl:   •  estrogens, conjugated, (PREMARIN) 0.625 MG tablet, Take 0.625 mg by mouth Daily., Disp: , Rfl:   •  ketoconazole (NIZORAL) 2 % cream, Apply 1 application topically Daily. Shampoo weekly, Disp: , Rfl:   •  ketoconazole (NIZORAL) 2 % shampoo, Apply  topically 1 (One) Time Per Week., Disp: , Rfl:   •  levothyroxine (SYNTHROID, LEVOTHROID) 75 MCG tablet, Take 75 mcg by mouth Daily., Disp: , Rfl:   •  meloxicam (MOBIC) 7.5 MG tablet, Take 1 tablet by mouth 2 (Two) Times a Day., Disp: 60 tablet, Rfl: 0  •  Omega-3 Fatty Acids (FISH OIL PO), Take  by mouth 2 (Two) Times a Day. Stopped one week prior to surgery, Disp: , Rfl:   •  omeprazole (priLOSEC) 40 MG capsule, Take 40 mg by mouth Daily., Disp: , Rfl:   •  polyethyl glycol-propyl glycol (SYSTANE) 0.4-0.3 % solution ophthalmic solution, 1 drop 3 (Three) Times a Day., Disp: , Rfl:   •  RABEprazole (ACIPHEX) 20 MG EC tablet, Take 20 mg by mouth Daily., Disp: , Rfl:       Review of Systems   Constitutional: Negative for activity change, appetite change, chills, diaphoresis, fatigue, fever and unexpected weight change.   HENT: Negative for congestion, dental problem, drooling, ear discharge, ear pain, facial swelling, hearing loss, mouth sores, nosebleeds, postnasal drip, rhinorrhea, sinus pressure, sneezing, sore throat, tinnitus, trouble swallowing and voice change.    Eyes: Negative for photophobia, pain, discharge, redness, itching and visual disturbance.   Respiratory: Positive for shortness of breath. Negative for apnea, cough, choking, chest tightness, wheezing and stridor.    Cardiovascular: Negative for chest pain, palpitations and leg swelling.   Gastrointestinal: Positive for abdominal pain and diarrhea. Negative for abdominal distention, anal bleeding, blood in stool, constipation, nausea, rectal pain and vomiting.  "  Endocrine: Positive for cold intolerance. Negative for heat intolerance, polydipsia, polyphagia and polyuria.   Genitourinary: Negative for decreased urine volume, difficulty urinating, dysuria, enuresis, flank pain, frequency, genital sores, hematuria and urgency.   Musculoskeletal: Positive for arthralgias, back pain, gait problem and neck pain. Negative for joint swelling, myalgias and neck stiffness.   Skin: Negative for color change, pallor, rash and wound.   Allergic/Immunologic: Negative for environmental allergies, food allergies and immunocompromised state.   Neurological: Positive for numbness. Negative for dizziness, tremors, seizures, syncope, facial asymmetry, speech difficulty, weakness, light-headedness and headaches.   Hematological: Negative for adenopathy. Does not bruise/bleed easily.   Psychiatric/Behavioral: Positive for decreased concentration. Negative for agitation, behavioral problems, confusion, dysphoric mood, hallucinations, self-injury, sleep disturbance and suicidal ideas. The patient is nervous/anxious. The patient is not hyperactive.      Physical Exam   Constitutional: She is oriented to person, place, and time. She appears well-developed and well-nourished.   HENT:   Head: Normocephalic.   Eyes: Conjunctivae are normal.   Neck: Normal range of motion.   Cardiovascular: Normal rate.    Pulmonary/Chest: Effort normal.   Neurological: She is alert and oriented to person, place, and time. She has normal reflexes. Gait normal.   Skin: Skin is warm and dry. There is erythema. There is pallor.   Psychiatric: She has a normal mood and affect. Her speech is normal and behavior is normal. Judgment and thought content normal.     /72  Temp 98.1 °F (36.7 °C)  Ht 160 cm (62.99\")  Wt 65 kg (143 lb 3.2 oz)  BMI 25.37 kg/m2    Neurologic Exam     Mental Status   Oriented to person, place, and time.   Attention: normal.   Speech: speech is normal   Level of consciousness: " alert  Knowledge: good.     Motor Exam   Muscle bulk: normal  Overall muscle tone: normal    Sensory Exam        Sensory alteration in the right thumb and index finger     Gait, Coordination, and Reflexes     Gait  Gait: normal    Tremor   Resting tremor: absent  Intention tremor: absent  Action tremor: absent    Medical Decision Making:   The patient is having significant symptoms associated with median nerve compression with atrophy of the thenar eminence of the right hand.  Her pinch strength is still intact however due to the numbness in her thumb and index finger she has difficulties picking up items.  With the findings on examination we have recommended median nerve release in an effort to maintain her strength and improve her pain.  The patient wishes to proceed with surgery.  The risk and benefits and reasoning the surgery were discussed and all of her questions were answered.  Our plan will be for surgery this Friday on January 5.    Sherice Carrington PA-C

## 2018-01-05 NOTE — BRIEF OP NOTE
CARPAL TUNNEL RELEASE  Progress Note    Alanna Rodriges  1/5/2018    Pre-op Diagnosis:   Carpal tunnel syndrome of right wrist [G56.01]       Post-Op Diagnosis Codes:     * Carpal tunnel syndrome of right wrist [G56.01]    Procedure/CPT® Codes:      Procedure(s):  CARPAL TUNNEL RELEASE RIGHT     Surgeon(s):  Edmond Mccrary MD    Anesthesia: Choice    Staff:   Circulator: Luisa Garcia RN  Scrub Person: Aydin Burdick  Orientee: Megan Pozo    Estimated Blood Loss: none    Urine Voided: * No values recorded between 1/5/2018  7:03 AM and 1/5/2018  7:39 AM *    Specimens:                None      Drains:   Ileostomy ileostomy (Active)   Appliance intact;no leakage 1/5/2018  7:27 AM   Tolerance no signs/symptoms of discomfort 1/5/2018  6:08 AM           Findings: Quite thick ligament    Complications: None  Edmond Mccrary MD     Date: 1/5/2018  Time: 7:39 AM

## 2018-01-05 NOTE — OP NOTE
Preoperative diagnosis: Right carpal tunnel syndrome    Postoperative diagnosis: Same    Operations performed: Release of transverse carpal ligament right    Indication: 77-year-old presenting with significant loss of muscle and strength in her right hand; nerve conduction studies show the presence of carpal tunnel and she was admitted for decompression.    Surgeon: Edmond Mccrary MD    Operative report: Subsequent to application of a [place] block supplemented with 1% Marcaine the right hand, wrist and arm were prepped and draped in the usual manner.  Using the surgical technique of tasha, the transverse carpal ligament was identified and incised throughout its entirety both proximally and distally.  The surgical findings included significantly thickened ligament compressing the nerve beneath it..  Hemostasis was obtained without difficulty.  The wound was closed in Vicryl and nylon.  A sterile dressing was applied and the patient was taken to recovery in satisfactory condition.

## 2018-01-11 ENCOUNTER — OFFICE VISIT (OUTPATIENT)
Dept: PAIN MEDICINE | Facility: CLINIC | Age: 78
End: 2018-01-11

## 2018-01-11 VITALS
DIASTOLIC BLOOD PRESSURE: 81 MMHG | HEIGHT: 65 IN | HEART RATE: 70 BPM | RESPIRATION RATE: 18 BRPM | WEIGHT: 146 LBS | BODY MASS INDEX: 24.32 KG/M2 | TEMPERATURE: 97.2 F | SYSTOLIC BLOOD PRESSURE: 132 MMHG | OXYGEN SATURATION: 98 %

## 2018-01-11 DIAGNOSIS — M51.27 DISPLACEMENT OF INTERVERTEBRAL DISC OF LUMBOSACRAL REGION: ICD-10-CM

## 2018-01-11 DIAGNOSIS — M51.36 DDD (DEGENERATIVE DISC DISEASE), LUMBAR: ICD-10-CM

## 2018-01-11 DIAGNOSIS — M16.11 PRIMARY OSTEOARTHRITIS OF RIGHT HIP: ICD-10-CM

## 2018-01-11 DIAGNOSIS — M48.00 NEUROFORAMINAL STENOSIS OF SPINE: ICD-10-CM

## 2018-01-11 DIAGNOSIS — M21.70 LEG LENGTH DISCREPANCY: ICD-10-CM

## 2018-01-11 DIAGNOSIS — M43.17 SPONDYLOLISTHESIS OF LUMBOSACRAL REGION: ICD-10-CM

## 2018-01-11 DIAGNOSIS — M47.816 SPONDYLOSIS OF LUMBAR REGION WITHOUT MYELOPATHY OR RADICULOPATHY: ICD-10-CM

## 2018-01-11 DIAGNOSIS — Z87.19 HISTORY OF ULCERATIVE COLITIS: ICD-10-CM

## 2018-01-11 PROCEDURE — 99214 OFFICE O/P EST MOD 30 MIN: CPT | Performed by: ANESTHESIOLOGY

## 2018-01-11 RX ORDER — GABAPENTIN 100 MG/1
CAPSULE ORAL
Qty: 120 CAPSULE | Refills: 1 | Status: SHIPPED | OUTPATIENT
Start: 2018-01-11 | End: 2018-03-02 | Stop reason: SDUPTHER

## 2018-01-11 RX ORDER — ME-TETRAHYDROFOLATE/B12/HRB236 1-1-500 MG
CAPSULE ORAL
Qty: 60 CAPSULE | Refills: 3 | Status: SHIPPED | OUTPATIENT
Start: 2018-01-11 | End: 2018-05-03 | Stop reason: SDUPTHER

## 2018-01-11 NOTE — PROGRESS NOTES
"CHIEF COMPLAINT: \"Pain in my lower back, right hip, and right leg.\"    BRIEF HISTORY: Mrs. Alanna Rodriges is a 77 y.o. female, who returns to the clinic for pain and right lower extremity pain. Patient was last seen in the office on 02/14/2017. Previously, patient underwent right sacroiliac joint injection with steroids combined with right greater trochanteric bursa injection and right iliopectineal bursa injection with steroids on September 19, 2016.  Patient reported at her last visit that her right hip pain had essentially resolved.  Patient was last in office on 02/14/2017. Patient underwent lumbar MRI since last visit. She did not return as previously planned.   Patient has a 12-year history of pain, which began without incident.   Pain starts in her lower back and radiates into the right hip, the lateral aspect of her thigh, and into the anterolateral aspect of her right right thigh and leg and into the dorsum of the right foot.  Patient describes the pain as aching, burning, and throbbing.    Pain level 4/10.  Pain level ranges from 0/10-10/10.    Pain increases with standing and walking.  Pain decreases with rest.   Patient denies numbness or weakness in the lower extremities.   Patient denies any new bladder or bowel problems.   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   Patient continues with an exercise regimen with a , Pilates weekly, and daily walks.   She reports the following changes in her medical history since her last visit:   Right ulnar nerve decompression with Dr. Mccrary on December 9, 2016 with excellent analgesic and functional outcome.  Right median nerve decompression at the wrist with Dr. Mccrary on 01/05/2018, with excellent analgesic outcome.    Review of previous therapies and additional medical records:  Alanna Rodriges has already failed the following measures, including:   Conservative measures: oral analgesics, opioids, topical analgesics, massage, physical " therapy, ice, heat and supervised exercise program, chiropractor.   Interventional: As referenced above  Surgical: No previous lumbar spine surgery  Alanna Rodriges underwent surgical  consultation with Dr. Villalta, who found her not to be a surgical candidate.   I have reviewed her [facility] Report #[id number] consistent with medication reconciliation.    Review of New Diagnostic Studies:   MRI of the lumbar spine 02/22/2017, revealed chronic degenerative changes. Disc levels:  L1-L2: Small disc bulge without significant canal or foraminal stenosis  L2-L3: Small disc bulge.  Asymmetric left foraminal/extra foraminal disc protrusion.  There is no significant canal or foraminal stenosis.  L3-L4: Small concentric disc bulge with superimposed left foraminal disc protrusion.  There is no canal or foraminal stenosis.  L4-L5: Small disc bulge with superimposed left foraminal disc protrusion.  There is no canal or foraminal stenosis.  L5-S1; Small concentric disc bulge with superimposed posterior central broad-based disc protrusion.  There is mild bilateral neuroforaminal stenosis. There is no canal stenosis.    Review of Previous Diagnostic Studies:  EMG/NCV which confirm right ulnar neuropathy at the elbow and bilateral carpal tunnel syndrome  CT scan of the brain September 27, 2016, revealed mild cerebral atrophy and white matter changes, nonspecific and likely secondary to chronic small vessel ischemia.  MRI of the cervical spine without contrast November 20, 2016 revealed moderate spondylosis and a stenosis of the cervical spine.  Disc osteophyte complexes at almost every level.  Hypertrophic facet changes.  Central canal is stenosis more prominent at C5-C6.  Various degrees of neuroforaminal stenosis.  SPINE LUMBAR FLEX AND EXTENSION 09/23/2015- There is limited to no range of motion with flexion and extension. There is complete collapse of L5-S1 disc space with sclerosis and osteophyte formation. There is a dense irregular  "calcification anterior to the L4 vertebral body. There are osteophytes at the L1-L2 level projecting anteriorly with sclerosis and narrowing of the disc space. There is no obvious laxity, subluxation or instability, however, the difference in the alignment between flexion and extension is negligible.  MRI Lumbar w/o contrast, 11/14/2014: Mild disc bulge, mild degenerative facets and thickening of the ligamentum flavum with no central or foraminal stenosis at L1-L2, L2-L3, L3-L4, and L4-L5. There is grade 1 retrolisthesis of L5 with respect to S1. At L5-S1, mild right and moderate left facet arthropathy. The left facet does abut the posterior aspect of the thecal sac. Mild bilateral narrowing of the bilateral neural foramen.   X-ray right hip, 11/12/2014: Mild right hip osteoarthritis  Bone Density, 09/04/2013: Normal     Review of Systems   Musculoskeletal: Positive for arthralgias, back pain, gait problem, myalgias, neck pain and neck stiffness.   Neurological: Positive for headaches.   Hematological: Bruises/bleeds easily.   Psychiatric/Behavioral: Positive for sleep disturbance. The patient is nervous/anxious.    All other systems reviewed and are negative.     The following portions of the patient's history were reviewed and updated as appropriate: problem list, past medical history, past surgery history, social history, family history, medications, and allergies     /81 (BP Location: Right arm, Patient Position: Sitting)  Pulse 70  Temp 97.2 °F (36.2 °C) (Temporal Artery )   Resp 18  Ht 165.1 cm (65\")  Wt 66.2 kg (146 lb)  SpO2 98%  BMI 24.3 kg/m2     Physical Exam   Neurologic Exam  Constitutional   General appearance: No acute distress, well appearing and well nourished. Appears healthy, well hydrated and appearance reflects stated age.   Head and face: Normal. Palpation of the face and sinuses: No sinus tenderness.   Eyes: Conjunctiva and lids: No swelling, erythema or discharge. Pupils: Equal, " round, reactive to light.   Neck: Supple, symmetric, trachea midline, no masses.   Pulmonary: Respiratory effort: No increased work of breathing or signs of respiratory distress. Auscultation of lungs: Clear to auscultation.   Cardiovascular: Auscultation of heart: Normal rate and rhythm, normal S1 and S2, no murmurs. Peripheral vascular exam: Normal.   Examination of extremities for edema and/or varicosities: Normal.   Abdomen: Non-tender, no masses. Bowel sounds were normal. The abdomen was soft and nontender. No masses palpated.   Musculoskeletal   Gait and station: Normal. Gait evaluation demonstrated a normal gait. The range of motion of the lumbar spine is remarkably improved. Lumbar facet joint loading maneuvers are negative. Lenny and Gaenslen's tests are negative. The range of motion of the hip joints is almost full and without pain. Palpation of the greater trochanter, does not reproduce pain. Palpation of the right iliopectineal bursa, does not reproduce pain   Skin and subcutaneous tissue: Normal without rashes or lesions.   Neurologic   Cranial nerves: Cranial nerves II-XII intact.   Cortical function: Normal mental status.   Reflexes: 2+ and symmetric. Deep tendon reflexes: 2+ right biceps, 2+ left biceps, 2+ right patella, 2+ left patella, 2+ right ankle jerk and 2+ left ankle jerk. Superficial/Primitive Reflexes: primitive reflexes were absent. Straight leg raising test is negative. Femoral stretch sign is negative.   Sensation: No sensory loss. Sensory exam: intact to light touch, intact pain and temperature sensation, intact vibration sensation and normal proprioception.   Coordination: Normal finger to nose and heel to shin. Coordination: normal balance and negative Romberg's sign.   Psychiatric: Judgment and insight: Normal.  Orientation to person, place, and time: Normal. Recent and remote memory: Intact. Mood and affect: Normal.      ASSESSMENT:   1. Displacement of intervertebral disc of  lumbosacral region    2. Neuroforaminal stenosis of spine    3. DDD (degenerative disc disease), lumbar    4. Spondylosis of lumbar region without myelopathy or radiculopathy    5. Spondylolisthesis of lumbosacral region, L5-S1    6. Primary osteoarthritis of right hip    7. History of ulcerative colitis    8. Leg length discrepancy       PLAN: Patient continues dealing with her chronic pain condition. Upon reviewing previous treatments, her mechanical lower back pain has resolved since lumbar medial branch rhizotomies performed two years ago. Her other sources of mechanical and soft tissue pain such as right sacroiliac joint dysfunction, right greater trochanteric bursitis and right iliopectineal bursitis, have resolved after respective injections on September 19, 2016.  Patient presents with lumbosacral radicular pain. Patient has a history of lumbar spondylolisthesis and neuroforaminal stenosis, as revealed on recent MRI.  I have reviewed all available patient's medical records as well as previous therapies as referenced above under history of present illness.  Therefore, I have proposed the following plan:   1.  Interventional pain management measures: Patient will be scheduled for diagnostic and therapeutic right L5-S1 transforaminal epidural steroid injection. If patient fails to obtain sustained pain relief, then, I will obtain neurosurgical consultation.  2. Pharmacological measures:   a. Patient takes Advil   b. Trial with gabapentin 100 mg four times a day  c. Trial with Rheumate BID  3. Long-term rehabilitation efforts:  a. Patient will start a comprehensive physical therapy program for reconditioning, water therapy, therapeutic exercise, core strengthening, gait and balance training, neurodynamics, ultrasound, ASTYM, E-STIM, myofascial release, and dry needling   b. Continue Pilates   c. Continue low impact exercise program with   d. Referral to [facility] for custom made orthotics  with 0.5 cm right shoe lift  4. The patient has been instructed to contact my office with any questions or difficulties. The patient understands the plan and agrees to proceed accordingly.     Patient Care Team:  Ismael Jones MD as PCP - General  Romaine Tanner MD as Consulting Physician (Pain Medicine)  Edmond Mccrary MD as Consulting Physician (Neurosurgery)  Sherice Carrington PA-C as Physician Assistant (Neurosurgery)     No orders of the defined types were placed in this encounter.        Future Appointments  Date Time Provider Department Center   1/25/2018 2:30 PM Sherice Carrington PA-C [facility] None         Romaine Tanner MD       EMR Dragon/Transcription disclaimer:  Much of this encounter note is an electronic transcription of spoken language to printed text. Electronic transcription of spoken language may permit erroneous, or at times, nonsensical words or phrases to be inadvertently transcribed. Although I have reviewed the note for such errors, some may still exist.

## 2018-01-25 ENCOUNTER — OFFICE VISIT (OUTPATIENT)
Dept: NEUROSURGERY | Facility: CLINIC | Age: 78
End: 2018-01-25

## 2018-01-25 VITALS — HEIGHT: 65 IN | BODY MASS INDEX: 24.83 KG/M2 | WEIGHT: 149 LBS

## 2018-01-25 DIAGNOSIS — G56.01 CARPAL TUNNEL SYNDROME OF RIGHT WRIST: ICD-10-CM

## 2018-01-25 DIAGNOSIS — G56.01 CARPAL TUNNEL SYNDROME OF RIGHT WRIST: Primary | ICD-10-CM

## 2018-01-25 PROCEDURE — 99024 POSTOP FOLLOW-UP VISIT: CPT | Performed by: PHYSICIAN ASSISTANT

## 2018-01-25 NOTE — PROGRESS NOTES
Alanna Rodriges  1940 01/25/2018  [id number]    Chief Complaint   Patient presents with   • Post-op     S/P Right CTR 01/05/2018       HPI:  S/p right median nerve release. Numbness in right index finger has improved some.    Past Medical History:   Diagnosis Date   • Anemia    • Anxiety disorder    • Arthritis    • Cataract    • Depression    • Dermatitis    • DVT (deep venous thrombosis)     1975   • Elbow pain    • GERD (gastroesophageal reflux disease)    • History of blood transfusion    • History of hepatitis C virus infection    • Hypothyroidism    • Kidney stones    • Migraine     occular   • PONV (postoperative nausea and vomiting)    • Ptosis due to aging    • Seborrhea    • Seizure 1975    during hospitalization as a side effect of Tofranil    • Shoulder pain     right   • Ulcerative colitis        Allergies   Allergen Reactions   • Codeine Nausea Only   • Morphine And Related Hallucinations     And itching After 3 days         Current Outpatient Prescriptions:   •  ALPRAZolam (XANAX) 0.25 MG tablet, Take 0.25 mg by mouth As Needed for anxiety., Disp: , Rfl:   •  aspirin 81 MG chewable tablet, Chew 81 mg Daily. Last dose 11/28/2016, Disp: , Rfl:   •  cholecalciferol (VITAMIN D3) 1000 units tablet, Take 1,000 Units by mouth Daily., Disp: , Rfl:   •  dicyclomine (BENTYL) 10 MG capsule, Take 10 mg by mouth Every Night., Disp: , Rfl:   •  Dietary Management Product (RHEUMATE) capsule, Take 1 capsule twice daily, Disp: 60 capsule, Rfl: 3  •  estrogens, conjugated, (PREMARIN) 0.625 MG tablet, Take 0.625 mg by mouth Daily., Disp: , Rfl:   •  gabapentin (NEURONTIN) 100 MG capsule, Take 1 cap QD for 3 days, then take 1 cap BID for 3 days, then take 1 cap TID for 3 days, then take 1 cap QID and continue, Disp: 120 capsule, Rfl: 1  •  ketoconazole (NIZORAL) 2 % cream, Apply 1 application topically Every 12 (Twelve) Hours. Shampoo weekly, Disp: , Rfl:   •  ketoconazole (NIZORAL) 2 % shampoo, Apply  topically 1  (One) Time Per Week., Disp: , Rfl:   •  levothyroxine (SYNTHROID, LEVOTHROID) 75 MCG tablet, Take 75 mcg by mouth Daily., Disp: , Rfl:   •  Omega-3 Fatty Acids (FISH OIL PO), Take 1 capsule by mouth 2 (Two) Times a Day. Stopped one week prior to surgery, Disp: , Rfl:   •  RABEprazole (ACIPHEX) 20 MG EC tablet, Take 20 mg by mouth Daily., Disp: , Rfl:     Social History     Social History   • Marital status:      Spouse name: N/A   • Number of children: N/A   • Years of education: N/A     Social History Main Topics   • Smoking status: Former Smoker   • Smokeless tobacco: Never Used      Comment: quit smoking in her 40's    • Alcohol use 8.4 oz/week     14 Glasses of wine per week   • Drug use: No   • Sexual activity: Defer     Other Topics Concern   • None     Social History Narrative       Family History   Problem Relation Age of Onset   • Alzheimer's disease Other    • Leukemia Other    • Breast cancer Neg Hx    • Ovarian cancer Neg Hx        Review of Systems   Constitutional: Negative for activity change, appetite change, chills, diaphoresis, fatigue, fever and unexpected weight change.   HENT: Negative for congestion, dental problem, drooling, ear discharge, ear pain, facial swelling, hearing loss, mouth sores, nosebleeds, postnasal drip, rhinorrhea, sinus pressure, sneezing, sore throat, tinnitus, trouble swallowing and voice change.    Eyes: Negative for photophobia, pain, discharge, redness, itching and visual disturbance.   Respiratory: Positive for shortness of breath. Negative for apnea, cough, choking, chest tightness, wheezing and stridor.    Cardiovascular: Negative for chest pain, palpitations and leg swelling.   Gastrointestinal: Positive for abdominal pain and diarrhea. Negative for abdominal distention, anal bleeding, blood in stool, constipation, nausea, rectal pain and vomiting.   Endocrine: Positive for cold intolerance. Negative for heat intolerance, polydipsia, polyphagia and polyuria.  "  Genitourinary: Negative for decreased urine volume, difficulty urinating, dysuria, enuresis, flank pain, frequency, genital sores, hematuria and urgency.   Musculoskeletal: Positive for arthralgias, back pain, gait problem and neck pain. Negative for joint swelling, myalgias and neck stiffness.   Skin: Negative for color change, pallor, rash and wound.   Allergic/Immunologic: Negative for environmental allergies, food allergies and immunocompromised state.   Neurological: Positive for numbness. Negative for dizziness, tremors, seizures, syncope, facial asymmetry, speech difficulty, weakness, light-headedness and headaches.   Hematological: Negative for adenopathy. Does not bruise/bleed easily.   Psychiatric/Behavioral: Positive for decreased concentration. Negative for agitation, behavioral problems, confusion, dysphoric mood, hallucinations, self-injury, sleep disturbance and suicidal ideas. The patient is nervous/anxious. The patient is not hyperactive.    All other systems reviewed and are negative.      PE:  Physical Exam  Ht 165.1 cm (65\")  Wt 67.6 kg (149 lb)  BMI 24.79 kg/m2    Neurologic Exam  Good ROM, strength intact.    MDM  Sutures removed, well healed surgical scar. Wound care discussed with patient. F/u prn.    Sherice Carrington PA-C        "

## 2018-02-21 ENCOUNTER — OUTSIDE FACILITY SERVICE (OUTPATIENT)
Dept: PAIN MEDICINE | Facility: CLINIC | Age: 78
End: 2018-02-21

## 2018-02-21 PROCEDURE — 99152 MOD SED SAME PHYS/QHP 5/>YRS: CPT | Performed by: ANESTHESIOLOGY

## 2018-02-21 PROCEDURE — 64483 NJX AA&/STRD TFRM EPI L/S 1: CPT | Performed by: ANESTHESIOLOGY

## 2018-03-02 RX ORDER — GABAPENTIN 100 MG/1
CAPSULE ORAL
Qty: 120 CAPSULE | Refills: 5 | OUTPATIENT
Start: 2018-03-02 | End: 2018-05-15 | Stop reason: SDUPTHER

## 2018-05-03 RX ORDER — ME-TETRAHYDROFOLATE/B12/HRB236 1-1-500 MG
CAPSULE ORAL
Qty: 60 CAPSULE | Refills: 3 | Status: SHIPPED | OUTPATIENT
Start: 2018-05-03 | End: 2018-09-30 | Stop reason: SDUPTHER

## 2018-05-15 ENCOUNTER — TELEPHONE (OUTPATIENT)
Dept: PAIN MEDICINE | Facility: CLINIC | Age: 78
End: 2018-05-15

## 2018-05-15 RX ORDER — GABAPENTIN 100 MG/1
CAPSULE ORAL
Qty: 120 CAPSULE | Refills: 5 | Status: SHIPPED | OUTPATIENT
Start: 2018-05-15 | End: 2018-07-02 | Stop reason: SDUPTHER

## 2018-05-15 NOTE — TELEPHONE ENCOUNTER
Patient c/o low back pain that radiates down the right leg as well as to the abdomen. Describes pain as burning, sharp, dull and aching. Pain is increased by standing or sitting for extended periods of time, washing dishes and preparing food. Liquid advil sometimes helps with the pain. Current pain level is 9/10     Patient had dx/tx right L5-S1 TFESI on 02/21/2018. She reports 100% pain relief for 3 weeks and the pain has gradually returned. She did not do physical therapy but she has been exercising and going to [facility].     POC states:   Follow-up in 3 weeks vs repeat right L5-S1 TFESI depending on patient's outcome. If patient fails to obtain sustained relief, then, I will obtain neurosurgical consultation.

## 2018-05-24 ENCOUNTER — TELEPHONE (OUTPATIENT)
Dept: PAIN MEDICINE | Facility: CLINIC | Age: 78
End: 2018-05-24

## 2018-05-24 NOTE — PROGRESS NOTES
"CHIEF COMPLAINT: \"Pain in my right lower back, right hip, and right leg.\"    BRIEF HISTORY: Mrs. Alanna Rodriges is a 77 y.o. female, who returns to the clinic for evaluation of right lower back and right lower extremity pain. Patient was last seen on February 21, 2018 when she underwent diagnostic and therapeutic right L5-S1 transforaminal epidural steroid injection and experienced 100% pain relief and functional improvement that lasted for more than 4 weeks. Then, her pain gradually returned.  Previously, patient underwent right sacroiliac joint injection with steroids combined with right greater trochanteric bursa injection and right iliopectineal bursa injection with steroids on September 19, 2016.  Patient reported at her last visit that her right hip pain had essentially resolved.   Patient has a 14-year history of pain, which began without incident.   Pain starts in her lower back and radiates into the right hip, the lateral aspect of her thigh, and into the anterolateral aspect of her right right thigh and leg and into the dorsum of the right foot.  Patient describes the pain as aching, burning, and throbbing.    Pain level 3/10  Pain level ranges from 0/10-10/10  Pain increases with standing and walking  Pain decreases with sitting or lying down   Patient reports some numbness and weakness in the lower extremities.   Patient denies any new bladder or bowel problems. 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   Patient continues with an exercise regimen with a , Pilates weekly, and daily walks.   She denies any changes in her medical history since her last visit    Review of previous therapies and additional medical records:  Alanna Rodriges has already failed the following measures, including:   Conservative measures: oral analgesics, opioids, topical analgesics, massage, physical therapy, ice, heat and supervised exercise program, chiropractor.   Interventional: As referenced " above  Surgical: No previous lumbar spine surgery  Right ulnar nerve decompression with Dr. Mccrary on December 9, 2016 with excellent analgesic and functional outcome.  Right median nerve decompression at the wrist with Dr. Mccrary on 01/05/2018, with excellent analgesic outcome.  Alanna Rodriges underwent surgical  consultation with Dr. Villalta, who found her not to be a surgical candidate.   I have reviewed her Rogerio Report #[id number] consistent with medication reconciliation.    Review of Diagnostic Studies:   MRI of the lumbar spine 02/22/2017, revealed chronic degenerative changes. Disc levels:  L1-L2: Small disc bulge without significant canal or foraminal stenosis  L2-L3: Small disc bulge.  Asymmetric left foraminal/extra foraminal disc protrusion.  There is no significant canal or foraminal stenosis.  L3-L4: Small concentric disc bulge with superimposed left foraminal disc protrusion.  There is no canal or foraminal stenosis.  L4-L5: Small disc bulge with superimposed left foraminal disc protrusion.  There is no canal or foraminal stenosis.  L5-S1; Small concentric disc bulge with superimposed posterior central broad-based disc protrusion.  There is mild bilateral neuroforaminal stenosis. There is no canal stenosis.  EMG/NCV which confirm right ulnar neuropathy at the elbow and bilateral carpal tunnel syndrome  CT scan of the brain September 27, 2016, revealed mild cerebral atrophy and white matter changes, nonspecific and likely secondary to chronic small vessel ischemia.  MRI of the cervical spine without contrast November 20, 2016 revealed moderate spondylosis and a stenosis of the cervical spine.  Disc osteophyte complexes at almost every level.  Hypertrophic facet changes.  Central canal is stenosis more prominent at C5-C6.  Various degrees of neuroforaminal stenosis.  SPINE LUMBAR FLEX AND EXTENSION 09/23/2015- There is limited to no range of motion with flexion and extension. There is complete collapse of  "L5-S1 disc space with sclerosis and osteophyte formation. There is a dense irregular calcification anterior to the L4 vertebral body. There are osteophytes at the L1-L2 level projecting anteriorly with sclerosis and narrowing of the disc space. There is no obvious laxity, subluxation or instability, however, the difference in the alignment between flexion and extension is negligible.  MRI Lumbar w/o contrast, 11/14/2014: Mild disc bulge, mild degenerative facets and thickening of the ligamentum flavum with no central or foraminal stenosis at L1-L2, L2-L3, L3-L4, and L4-L5. There is grade 1 retrolisthesis of L5 with respect to S1. At L5-S1, mild right and moderate left facet arthropathy. The left facet does abut the posterior aspect of the thecal sac. Mild bilateral narrowing of the bilateral neural foramen.   X-ray right hip, 11/12/2014: Mild right hip osteoarthritis  Bone Density, 09/04/2013: Normal     Review of Systems   Respiratory: Positive for cough and shortness of breath.    Gastrointestinal: Positive for abdominal pain and nausea.   Musculoskeletal: Positive for arthralgias, back pain, gait problem, myalgias and neck pain.   Neurological: Positive for headaches.   Psychiatric/Behavioral: Positive for sleep disturbance. The patient is nervous/anxious.    All other systems reviewed and are negative.     The following portions of the patient's history were reviewed and updated as appropriate: problem list, past medical history, past surgery history, social history, family history, medications, and allergies     /76   Pulse 78   Temp 97.4 °F (36.3 °C) (Temporal Artery )   Ht 162.6 cm (64\")   Wt 67.6 kg (149 lb)   BMI 25.58 kg/m²      Physical Exam   Neurologic Exam  Constitutional   General appearance: No acute distress, well appearing and well nourished. Appears healthy, well hydrated and appearance reflects stated age.   Head and face: Normal. Palpation of the face and sinuses: No sinus tenderness. "   Eyes: Conjunctiva and lids: No swelling, erythema or discharge. Pupils: Equal, round, reactive to light.   Neck: Supple, symmetric, trachea midline, no masses.   Pulmonary: Respiratory effort: No increased work of breathing or signs of respiratory distress. Auscultation of lungs: Clear to auscultation.   Cardiovascular: Auscultation of heart: Normal rate and rhythm, normal S1 and S2, no murmurs. Peripheral vascular exam: Normal.   Examination of extremities for edema and/or varicosities: Normal.   Abdomen: Non-tender, no masses. Bowel sounds were normal. The abdomen was soft and nontender. No masses palpated.   Musculoskeletal   Gait and station: Normal. Gait evaluation demonstrated a normal gait. The range of motion of the lumbar spine is remarkably improved. Lumbar facet joint loading maneuvers are negative. Lenny and Gaenslen's tests are negative. The range of motion of the hip joints is almost full and without pain. Palpation of the greater trochanter, does not reproduce pain. Palpation of the right iliopectineal bursa, does not reproduce pain   Skin and subcutaneous tissue: Normal without rashes or lesions.   Neurologic   Cranial nerves: Cranial nerves II-XII intact.   Cortical function: Normal mental status.   Reflexes: 2+ and symmetric. Deep tendon reflexes: 2+ right biceps, 2+ left biceps, 2+ right patella, 2+ left patella, 2+ right ankle jerk and 2+ left ankle jerk. Superficial/Primitive Reflexes: primitive reflexes were absent. Straight leg raising test is positive on the right. Femoral stretch sign is negative.   Sensation: No sensory loss. Sensory exam: intact to light touch, intact pain and temperature sensation, intact vibration sensation and normal proprioception.   Coordination: Normal finger to nose and heel to shin. Coordination: normal balance and negative Romberg's sign.   Psychiatric: Judgment and insight: Normal.  Orientation to person, place, and time: Normal. Recent and remote memory:  Intact. Mood and affect: Normal.      ASSESSMENT:   1. Neuroforaminal stenosis of spine    2. Displacement of intervertebral disc of lumbosacral region    3. Spondylolisthesis of lumbosacral region, L5-S1    4. DDD (degenerative disc disease), lumbar    5. Spondylosis of lumbar region without myelopathy or radiculopathy    6. Primary osteoarthritis of right hip    7. Leg length discrepancy    8. History of ulcerative colitis       PLAN: Patient presents with recurrent right L4-L5 radicular pain. Upon reviewing previous treatments, her mechanical lower back pain has resolved since lumbar medial branch rhizotomies performed two years ago. Her other sources of mechanical and soft tissue pain such as right sacroiliac joint dysfunction, right greater trochanteric bursitis and right iliopectineal bursitis, have all resolved after respective injections on September 19, 2016.  Patient has a history of lumbar spondylolisthesis and neuroforaminal stenosis, as revealed on MRI.  I have reviewed all available patient's medical records as well as previous therapies as referenced above under history of present illness.  Therefore, I have proposed the following plan:   1.  Interventional pain management measures: Patient will be scheduled for diagnostic and therapeutic right L4-L5 and right L5-S1 transforaminal epidural steroid injection. If patient fails to obtain sustained pain relief, then, I will obtain neurosurgical consultation.  2. Pharmacological measures:   a. Patient takes Advil PRN  b. Continue gabapentin 100 mg four times a day  c. Continue Rheumate BID  3. Long-term rehabilitation efforts:  a. Patient will start a comprehensive physical therapy program for reconditioning, water therapy, therapeutic exercise, core strengthening, gait and balance training, neurodynamics, ultrasound, ASTYM, E-STIM, myofascial release, and dry needling   b. Continue Pilates   c. Continue low impact exercise program with personal    d. Referral to [facility] bracing for custom made orthotics with 0.5 cm right shoe lift  4. The patient has been instructed to contact my office with any questions or difficulties. The patient understands the plan and agrees to proceed accordingly.     Patient Care Team:  Ismael Jones MD as PCP - General  Romaine Tanner MD as Consulting Physician (Pain Medicine)  Edmond Mccrary MD as Consulting Physician (Neurosurgery)  Sherice Carrington PA-C as Physician Assistant (Neurosurgery)     No orders of the defined types were placed in this encounter.        No future appointments.      Romaine Tanner MD       EMR Dragon/Transcription disclaimer:  Much of this encounter note is an electronic transcription of spoken language to printed text. Electronic transcription of spoken language may permit erroneous, or at times, nonsensical words or phrases to be inadvertently transcribed. Although I have reviewed the note for such errors, some may still exist.

## 2018-05-25 ENCOUNTER — OFFICE VISIT (OUTPATIENT)
Dept: PAIN MEDICINE | Facility: CLINIC | Age: 78
End: 2018-05-25

## 2018-05-25 VITALS
TEMPERATURE: 97.4 F | WEIGHT: 149 LBS | BODY MASS INDEX: 25.44 KG/M2 | HEART RATE: 78 BPM | SYSTOLIC BLOOD PRESSURE: 130 MMHG | HEIGHT: 64 IN | DIASTOLIC BLOOD PRESSURE: 76 MMHG

## 2018-05-25 DIAGNOSIS — M21.70 LEG LENGTH DISCREPANCY: ICD-10-CM

## 2018-05-25 DIAGNOSIS — M51.27 DISPLACEMENT OF INTERVERTEBRAL DISC OF LUMBOSACRAL REGION: ICD-10-CM

## 2018-05-25 DIAGNOSIS — M16.11 PRIMARY OSTEOARTHRITIS OF RIGHT HIP: ICD-10-CM

## 2018-05-25 DIAGNOSIS — Z87.19 HISTORY OF ULCERATIVE COLITIS: ICD-10-CM

## 2018-05-25 DIAGNOSIS — M48.00 NEUROFORAMINAL STENOSIS OF SPINE: ICD-10-CM

## 2018-05-25 DIAGNOSIS — M47.816 SPONDYLOSIS OF LUMBAR REGION WITHOUT MYELOPATHY OR RADICULOPATHY: ICD-10-CM

## 2018-05-25 DIAGNOSIS — M51.36 DDD (DEGENERATIVE DISC DISEASE), LUMBAR: ICD-10-CM

## 2018-05-25 DIAGNOSIS — M43.17 SPONDYLOLISTHESIS OF LUMBOSACRAL REGION: ICD-10-CM

## 2018-05-25 PROCEDURE — 99213 OFFICE O/P EST LOW 20 MIN: CPT | Performed by: ANESTHESIOLOGY

## 2018-05-31 ENCOUNTER — TRANSCRIBE ORDERS (OUTPATIENT)
Dept: ADMINISTRATIVE | Facility: HOSPITAL | Age: 78
End: 2018-05-31

## 2018-05-31 DIAGNOSIS — Z12.31 VISIT FOR SCREENING MAMMOGRAM: Primary | ICD-10-CM

## 2018-06-20 ENCOUNTER — OUTSIDE FACILITY SERVICE (OUTPATIENT)
Dept: PAIN MEDICINE | Facility: CLINIC | Age: 78
End: 2018-06-20

## 2018-06-20 PROCEDURE — 64483 NJX AA&/STRD TFRM EPI L/S 1: CPT | Performed by: ANESTHESIOLOGY

## 2018-06-20 PROCEDURE — 64484 NJX AA&/STRD TFRM EPI L/S EA: CPT | Performed by: ANESTHESIOLOGY

## 2018-06-20 PROCEDURE — 99152 MOD SED SAME PHYS/QHP 5/>YRS: CPT | Performed by: ANESTHESIOLOGY

## 2018-06-21 ENCOUNTER — TELEPHONE (OUTPATIENT)
Dept: PAIN MEDICINE | Facility: CLINIC | Age: 78
End: 2018-06-21

## 2018-06-21 NOTE — TELEPHONE ENCOUNTER
Patient had dx/tx right L4-L5 TFESI on 06/20/2018. Called to check on patient and reached voicemail. Left message for return call

## 2018-07-02 RX ORDER — GABAPENTIN 100 MG/1
CAPSULE ORAL
Qty: 120 CAPSULE | Refills: 5 | OUTPATIENT
Start: 2018-07-02 | End: 2018-07-30 | Stop reason: SDUPTHER

## 2018-07-02 NOTE — TELEPHONE ENCOUNTER
Received Rx request for Gabapentin 100 mg QID. Patient last seen 06/20/2018 for right L4-L5 TFESI. PARAMJIT report # [id number] scanned into media     Per Dr. Tanner: ok to refill    Rx called into pharmacy

## 2018-07-18 ENCOUNTER — LAB (OUTPATIENT)
Dept: LAB | Facility: HOSPITAL | Age: 78
End: 2018-07-18

## 2018-07-18 ENCOUNTER — TRANSCRIBE ORDERS (OUTPATIENT)
Dept: LAB | Facility: HOSPITAL | Age: 78
End: 2018-07-18

## 2018-07-18 DIAGNOSIS — E03.9 MYXEDEMA HEART DISEASE: Primary | ICD-10-CM

## 2018-07-18 DIAGNOSIS — E80.6 HYPERBILIRUBINEMIA: ICD-10-CM

## 2018-07-18 DIAGNOSIS — E80.6 HYPERBILIRUBINEMIA: Primary | ICD-10-CM

## 2018-07-18 DIAGNOSIS — E55.9 AVITAMINOSIS D: ICD-10-CM

## 2018-07-18 DIAGNOSIS — I51.9 MYXEDEMA HEART DISEASE: ICD-10-CM

## 2018-07-18 DIAGNOSIS — E03.9 MYXEDEMA HEART DISEASE: ICD-10-CM

## 2018-07-18 DIAGNOSIS — I51.9 MYXEDEMA HEART DISEASE: Primary | ICD-10-CM

## 2018-07-18 LAB
BASOPHILS # BLD AUTO: 0.03 10*3/MM3 (ref 0–0.2)
BASOPHILS NFR BLD AUTO: 0.5 % (ref 0–1)
DEPRECATED RDW RBC AUTO: 47.6 FL (ref 37–54)
EOSINOPHIL # BLD AUTO: 0.17 10*3/MM3 (ref 0–0.3)
EOSINOPHIL NFR BLD AUTO: 2.9 % (ref 0–3)
ERYTHROCYTE [DISTWIDTH] IN BLOOD BY AUTOMATED COUNT: 12.7 % (ref 11.3–14.5)
HCT VFR BLD AUTO: 48.2 % (ref 34.5–44)
HGB BLD-MCNC: 15.9 G/DL (ref 11.5–15.5)
IMM GRANULOCYTES # BLD: 0.01 10*3/MM3 (ref 0–0.03)
IMM GRANULOCYTES NFR BLD: 0.2 % (ref 0–0.6)
LYMPHOCYTES # BLD AUTO: 2.21 10*3/MM3 (ref 0.6–4.8)
LYMPHOCYTES NFR BLD AUTO: 37.1 % (ref 24–44)
MCH RBC QN AUTO: 34.1 PG (ref 27–31)
MCHC RBC AUTO-ENTMCNC: 33 G/DL (ref 32–36)
MCV RBC AUTO: 103.4 FL (ref 80–99)
MONOCYTES # BLD AUTO: 0.62 10*3/MM3 (ref 0–1)
MONOCYTES NFR BLD AUTO: 10.4 % (ref 0–12)
NEUTROPHILS # BLD AUTO: 2.92 10*3/MM3 (ref 1.5–8.3)
NEUTROPHILS NFR BLD AUTO: 48.9 % (ref 41–71)
PLAT MORPH BLD: NORMAL
PLATELET # BLD AUTO: 191 10*3/MM3 (ref 150–450)
PMV BLD AUTO: 11.7 FL (ref 6–12)
RBC # BLD AUTO: 4.66 10*6/MM3 (ref 3.89–5.14)
RBC MORPH BLD: NORMAL
WBC MORPH BLD: NORMAL
WBC NRBC COR # BLD: 5.96 10*3/MM3 (ref 3.5–10.8)

## 2018-07-18 PROCEDURE — 85007 BL SMEAR W/DIFF WBC COUNT: CPT

## 2018-07-18 PROCEDURE — 36415 COLL VENOUS BLD VENIPUNCTURE: CPT | Performed by: NURSE PRACTITIONER

## 2018-07-18 PROCEDURE — 85025 COMPLETE CBC W/AUTO DIFF WBC: CPT

## 2018-07-19 ENCOUNTER — LAB (OUTPATIENT)
Dept: LAB | Facility: HOSPITAL | Age: 78
End: 2018-07-19

## 2018-07-19 DIAGNOSIS — E03.9 MYXEDEMA HEART DISEASE: Primary | ICD-10-CM

## 2018-07-19 DIAGNOSIS — I51.9 MYXEDEMA HEART DISEASE: Primary | ICD-10-CM

## 2018-07-19 LAB
ALBUMIN SERPL-MCNC: 4.36 G/DL (ref 3.2–4.8)
ALBUMIN/GLOB SERPL: 1.6 G/DL (ref 1.5–2.5)
ALP SERPL-CCNC: 50 U/L (ref 25–100)
ALT SERPL W P-5'-P-CCNC: 17 U/L (ref 7–40)
ANION GAP SERPL CALCULATED.3IONS-SCNC: 16 MMOL/L (ref 3–11)
ARTICHOKE IGE QN: 84 MG/DL (ref 0–130)
AST SERPL-CCNC: 32 U/L (ref 0–33)
BILIRUB SERPL-MCNC: 0.6 MG/DL (ref 0.3–1.2)
BUN BLD-MCNC: 12 MG/DL (ref 9–23)
BUN/CREAT SERPL: 13.5 (ref 7–25)
CALCIUM SPEC-SCNC: 8.7 MG/DL (ref 8.7–10.4)
CHLORIDE SERPL-SCNC: 104 MMOL/L (ref 99–109)
CHOLEST SERPL-MCNC: 208 MG/DL (ref 0–200)
CO2 SERPL-SCNC: 19 MMOL/L (ref 20–31)
CREAT BLD-MCNC: 0.89 MG/DL (ref 0.6–1.3)
GFR SERPL CREATININE-BSD FRML MDRD: 62 ML/MIN/1.73
GLOBULIN UR ELPH-MCNC: 2.7 GM/DL
GLUCOSE BLD-MCNC: 91 MG/DL (ref 70–100)
HDLC SERPL-MCNC: 115 MG/DL (ref 40–60)
POTASSIUM BLD-SCNC: 4.7 MMOL/L (ref 3.5–5.5)
PROT SERPL-MCNC: 7.1 G/DL (ref 5.7–8.2)
SODIUM BLD-SCNC: 139 MMOL/L (ref 132–146)
TRIGL SERPL-MCNC: 66 MG/DL (ref 0–150)
TSH SERPL DL<=0.05 MIU/L-ACNC: 1.9 MIU/ML (ref 0.35–5.35)

## 2018-07-19 PROCEDURE — 82652 VIT D 1 25-DIHYDROXY: CPT

## 2018-07-19 PROCEDURE — 84443 ASSAY THYROID STIM HORMONE: CPT

## 2018-07-19 PROCEDURE — 80061 LIPID PANEL: CPT

## 2018-07-19 PROCEDURE — 80053 COMPREHEN METABOLIC PANEL: CPT

## 2018-07-29 RX ORDER — GABAPENTIN 100 MG/1
CAPSULE ORAL
Qty: 120 CAPSULE | Status: CANCELLED | OUTPATIENT
Start: 2018-07-29

## 2018-07-30 RX ORDER — GABAPENTIN 100 MG/1
CAPSULE ORAL
Qty: 360 CAPSULE | Refills: 1 | OUTPATIENT
Start: 2018-07-30 | End: 2019-01-23 | Stop reason: SDUPTHER

## 2018-07-30 NOTE — TELEPHONE ENCOUNTER
Received refill request for Gabapentin 100 mg. Patient last seen 6/20/18. PARAMJIT report # [id number] scanned to media     Per Dr. Tanner: ok to refill

## 2018-08-06 ENCOUNTER — OFFICE VISIT (OUTPATIENT)
Dept: NEUROSURGERY | Facility: CLINIC | Age: 78
End: 2018-08-06

## 2018-08-06 VITALS
DIASTOLIC BLOOD PRESSURE: 68 MMHG | SYSTOLIC BLOOD PRESSURE: 122 MMHG | HEIGHT: 64 IN | BODY MASS INDEX: 25.27 KG/M2 | TEMPERATURE: 97.2 F | WEIGHT: 148 LBS

## 2018-08-06 DIAGNOSIS — G56.01 CARPAL TUNNEL SYNDROME OF RIGHT WRIST: Primary | ICD-10-CM

## 2018-08-06 PROCEDURE — 99212 OFFICE O/P EST SF 10 MIN: CPT | Performed by: NEUROLOGICAL SURGERY

## 2018-08-06 NOTE — PROGRESS NOTES
Alanna Rodriges  1940  [id number]                        CHIEF COMPLAINT:  Weakness right first finger          MEDICAL HISTORY SINCE LAST ENCOUNTER:  This is a 77-year-old who is status post median neuropathy and ulnar nerve decompression who presents with weakness in her first finger on the right hand.  It involves all of the first finger.  She has atrophy related to long-standing ulnar neuropathy which was present prior to the decompression.  Risks no other symptoms that she has at this time.            Past Medical History:   Diagnosis Date   • Anemia    • Anxiety disorder    • Arthritis    • Cataract    • Depression    • Dermatitis    • DVT (deep venous thrombosis) (CMS/HCC)     1975   • Elbow pain    • GERD (gastroesophageal reflux disease)    • History of blood transfusion    • History of hepatitis C virus infection    • Hypothyroidism    • Kidney stones    • Migraine     occular   • PONV (postoperative nausea and vomiting)    • Ptosis due to aging    • Seborrhea    • Seizure (CMS/HCC) 1975    during hospitalization as a side effect of Tofranil    • Shoulder pain     right   • Ulcerative colitis (CMS/HCC)               Past Surgical History:   Procedure Laterality Date   • BUNIONECTOMY Bilateral    • CARPAL TUNNEL RELEASE Right 1/5/2018    Procedure: CARPAL TUNNEL RELEASE RIGHT ;  Surgeon: Edmond Mccrary MD;  Location:  [id number];  Service:    • COLECTOMY TOTAL     • COLON SURGERY     • D&C AND LAPAROSCOPY     • HYSTERECTOMY      bso   • ILEOSTOMY     • SKIN BIOPSY     • ULNAR NERVE DECOMPRESSION Right 12/9/2016    Procedure: RIGHT ULNAR NERVE DECOMPRESSION;  Surgeon: Edmond Mccrary MD;  Location:  [facility];  Service:    • URETHRAL DILATION      multiple              Family History   Problem Relation Age of Onset   • Alzheimer's disease Other    • Leukemia Other    • Breast cancer Neg Hx    • Ovarian cancer Neg Hx               Social History     Social History   • Marital status:      Spouse  name: N/A   • Number of children: N/A   • Years of education: N/A     Occupational History   • Not on file.     Social History Main Topics   • Smoking status: Former Smoker   • Smokeless tobacco: Never Used      Comment: quit smoking in her 40's    • Alcohol use 8.4 oz/week     14 Glasses of wine per week   • Drug use: No   • Sexual activity: Defer     Other Topics Concern   • Not on file     Social History Narrative   • No narrative on file              Allergies   Allergen Reactions   • Codeine Nausea Only   • Morphine And Related Hallucinations     And itching After 3 days              Current Outpatient Prescriptions:   •  ALPRAZolam (XANAX) 0.25 MG tablet, Take 0.25 mg by mouth As Needed for anxiety., Disp: , Rfl:   •  aspirin 81 MG chewable tablet, Chew 81 mg Daily. Last dose 11/28/2016, Disp: , Rfl:   •  cholecalciferol (VITAMIN D3) 1000 units tablet, Take 1,000 Units by mouth Daily., Disp: , Rfl:   •  dicyclomine (BENTYL) 10 MG capsule, Take 10 mg by mouth Every Night., Disp: , Rfl:   •  Dietary Management Product (RHEUMATE) capsule, Take 1 capsule twice daily, Disp: 60 capsule, Rfl: 3  •  estrogens, conjugated, (PREMARIN) 0.625 MG tablet, Take 0.625 mg by mouth Daily., Disp: , Rfl:   •  gabapentin (NEURONTIN) 100 MG capsule, Take 1 cap po QID, Disp: 360 capsule, Rfl: 1  •  ketoconazole (NIZORAL) 2 % cream, Apply 1 application topically Every 12 (Twelve) Hours. Shampoo weekly, Disp: , Rfl:   •  ketoconazole (NIZORAL) 2 % shampoo, Apply  topically 1 (One) Time Per Week., Disp: , Rfl:   •  levothyroxine (SYNTHROID, LEVOTHROID) 75 MCG tablet, Take 75 mcg by mouth Daily., Disp: , Rfl:   •  Omega-3 Fatty Acids (FISH OIL PO), Take 1 capsule by mouth 2 (Two) Times a Day. Stopped one week prior to surgery, Disp: , Rfl:   •  RABEprazole (ACIPHEX) 20 MG EC tablet, Take 20 mg by mouth Daily., Disp: , Rfl:          Review of Systems   Constitutional: Negative for activity change, appetite change, chills, diaphoresis,  fatigue, fever and unexpected weight change.   HENT: Negative for congestion, dental problem, drooling, ear discharge, ear pain, facial swelling, hearing loss, mouth sores, nosebleeds, postnasal drip, rhinorrhea, sinus pressure, sneezing, sore throat, tinnitus, trouble swallowing and voice change.    Eyes: Negative for photophobia, pain, discharge, redness, itching and visual disturbance.   Respiratory: Negative for apnea, cough, choking, chest tightness, shortness of breath, wheezing and stridor.    Cardiovascular: Negative for chest pain, palpitations and leg swelling.   Gastrointestinal: Negative for abdominal distention, abdominal pain, anal bleeding, blood in stool, constipation, diarrhea, nausea, rectal pain and vomiting.   Endocrine: Negative for cold intolerance, heat intolerance, polydipsia, polyphagia and polyuria.   Genitourinary: Negative for decreased urine volume, difficulty urinating, dysuria, enuresis, flank pain, frequency, genital sores, hematuria and urgency.   Musculoskeletal: Positive for back pain, myalgias and neck pain. Negative for arthralgias, gait problem, joint swelling and neck stiffness.   Skin: Negative for color change, pallor, rash and wound.   Allergic/Immunologic: Negative for environmental allergies, food allergies and immunocompromised state.   Neurological: Positive for dizziness, light-headedness and numbness. Negative for tremors, seizures, syncope, facial asymmetry, speech difficulty, weakness and headaches.   Hematological: Negative for adenopathy. Does not bruise/bleed easily.   Psychiatric/Behavioral: Negative for agitation, behavioral problems, confusion, decreased concentration, dysphoric mood, hallucinations, self-injury, sleep disturbance and suicidal ideas. The patient is not nervous/anxious and is not hyperactive.                Vitals:    08/06/18 1413   BP: 122/68   BP Location: Right arm   Patient Position: Sitting   Temp: 97.2 °F (36.2 °C)   TempSrc: Temporal  "Artery    Weight: 67.1 kg (148 lb)   Height: 162.6 cm (64\")               EXAMINATION: She actually has good movement of her right hand.  She has weakness of the first dorsal interosseous.  The opponens is quite strong.  She had decreased sensation in the ulnar distribution more so than the median.  There is no reflex asymmetry             MEDICAL DECISION MAKING: I'm assuming these are long-standing and results from median neuropathy and decompression of the ulnar nerve with atrophy and perhaps disuse.            ASSESSMENT/DISPOSITION:  I've asked her to see physical therapy and she will call me subsequent way.  I can offer her no good suggestion in the context of progressive neurological dysfunction that would explain the symptoms that she has.  I will follow through with this              I APPRECIATE THE OPPORTUNITY OF THIS REFERRAL. PLEASE CALL IF ANY       QUESTIONS [phone]    Scribed for Edmond Mccrary MD by Ninoska Gomes CMA. 8/6/2018  2:26 PM      "

## 2018-08-15 ENCOUNTER — HOSPITAL ENCOUNTER (OUTPATIENT)
Dept: MAMMOGRAPHY | Facility: HOSPITAL | Age: 78
Discharge: HOME | End: 2018-08-15
Attending: INTERNAL MEDICINE | Admitting: INTERNAL MEDICINE

## 2018-08-15 ENCOUNTER — APPOINTMENT (OUTPATIENT)
Dept: MAMMOGRAPHY | Facility: HOSPITAL | Age: 78
End: 2018-08-15
Attending: INTERNAL MEDICINE

## 2018-08-15 DIAGNOSIS — Z12.31 VISIT FOR SCREENING MAMMOGRAM: ICD-10-CM

## 2018-08-15 PROCEDURE — 77063 BREAST TOMOSYNTHESIS BI: CPT

## 2018-08-15 PROCEDURE — 77067 SCR MAMMO BI INCL CAD: CPT | Performed by: RADIOLOGY

## 2018-08-15 PROCEDURE — 77063 BREAST TOMOSYNTHESIS BI: CPT | Performed by: RADIOLOGY

## 2018-08-15 PROCEDURE — 77067 SCR MAMMO BI INCL CAD: CPT

## 2018-08-24 DIAGNOSIS — M79.643 PAIN OF HAND, UNSPECIFIED LATERALITY: Primary | ICD-10-CM

## 2018-08-29 ENCOUNTER — TREATMENT (OUTPATIENT)
Dept: PHYSICAL THERAPY | Facility: CLINIC | Age: 78
End: 2018-08-29

## 2018-08-29 DIAGNOSIS — G56.01 CARPAL TUNNEL SYNDROME OF RIGHT WRIST: Primary | ICD-10-CM

## 2018-08-29 DIAGNOSIS — M79.641 PAIN OF RIGHT HAND: ICD-10-CM

## 2018-08-29 PROCEDURE — 97162 PT EVAL MOD COMPLEX 30 MIN: CPT | Performed by: PHYSICAL THERAPIST

## 2018-08-29 PROCEDURE — 97110 THERAPEUTIC EXERCISES: CPT | Performed by: PHYSICAL THERAPIST

## 2018-10-01 RX ORDER — ME-TETRAHYDROFOLATE/B12/HRB236 1-1-500 MG
CAPSULE ORAL
Qty: 180 CAPSULE | Refills: 3 | Status: SHIPPED | OUTPATIENT
Start: 2018-10-01 | End: 2019-07-17

## 2019-01-04 RX ORDER — LEVOTHYROXINE SODIUM 0.07 MG/1
75 TABLET ORAL DAILY
Qty: 90 TABLET | Refills: 0 | Status: SHIPPED | OUTPATIENT
Start: 2019-01-04 | End: 2019-03-18 | Stop reason: SDUPTHER

## 2019-01-08 RX ORDER — GABAPENTIN 100 MG/1
CAPSULE ORAL
Qty: 360 CAPSULE | OUTPATIENT
Start: 2019-01-08

## 2019-01-09 ENCOUNTER — TELEPHONE (OUTPATIENT)
Dept: PAIN MEDICINE | Facility: CLINIC | Age: 79
End: 2019-01-09

## 2019-01-09 NOTE — TELEPHONE ENCOUNTER
Called patient to schedule OV in order to fill her medications. She stated that she was out of [place] until [place] , pt is going to contact her primary care office an see if they will refill since she has seen them recently.  Let patient know to call us back if she had any trouble an we would see what needed to be done.   ----- Message from Marva Benavidez MA sent at 1/8/2019  7:00 AM EST -----  Regarding: Med Refill  Received refill request for Gabapentin. Patient last seen more than 6 months ago. Please schedule med refill. Thank you

## 2019-01-21 RX ORDER — ATORVASTATIN CALCIUM 10 MG/1
TABLET, FILM COATED ORAL
Qty: 90 TABLET | Refills: 1 | Status: SHIPPED | OUTPATIENT
Start: 2019-01-21 | End: 2019-06-26 | Stop reason: SDUPTHER

## 2019-01-22 ENCOUNTER — TELEPHONE (OUTPATIENT)
Dept: INTERNAL MEDICINE | Facility: CLINIC | Age: 79
End: 2019-01-22

## 2019-01-22 RX ORDER — DICYCLOMINE HYDROCHLORIDE 10 MG/1
10 CAPSULE ORAL NIGHTLY
Qty: 90 CAPSULE | Refills: 0 | Status: SHIPPED | OUTPATIENT
Start: 2019-01-22 | End: 2019-07-09 | Stop reason: SDUPTHER

## 2019-01-23 RX ORDER — GABAPENTIN 100 MG/1
CAPSULE ORAL
Qty: 360 CAPSULE | Refills: 0 | Status: SHIPPED | OUTPATIENT
Start: 2019-01-23 | End: 2019-04-01 | Stop reason: SDUPTHER

## 2019-01-23 NOTE — TELEPHONE ENCOUNTER
Called pt and verified dose  100 mg qid. Was previously prescribed by Dr. Tanner. She wants sent to [facility]

## 2019-03-19 RX ORDER — LEVOTHYROXINE SODIUM 0.07 MG/1
75 TABLET ORAL DAILY
Qty: 90 TABLET | Refills: 0 | Status: SHIPPED | OUTPATIENT
Start: 2019-03-19 | End: 2019-05-29 | Stop reason: SDUPTHER

## 2019-04-03 RX ORDER — GABAPENTIN 100 MG/1
CAPSULE ORAL
Qty: 360 CAPSULE | Refills: 0 | OUTPATIENT
Start: 2019-04-03 | End: 2020-01-02 | Stop reason: SDUPTHER

## 2019-04-04 RX ORDER — GABAPENTIN 100 MG/1
CAPSULE ORAL
Qty: 360 CAPSULE | Refills: 0 | OUTPATIENT
Start: 2019-04-04

## 2019-05-09 RX ORDER — RABEPRAZOLE SODIUM 20 MG/1
TABLET, DELAYED RELEASE ORAL
Qty: 90 TABLET | Refills: 0 | Status: SHIPPED | OUTPATIENT
Start: 2019-05-09 | End: 2019-07-09 | Stop reason: SDUPTHER

## 2019-05-13 RX ORDER — CONJUGATED ESTROGENS 0.62 MG/1
TABLET, FILM COATED ORAL
Qty: 90 TABLET | Refills: 0 | Status: SHIPPED | OUTPATIENT
Start: 2019-05-13 | End: 2019-07-09 | Stop reason: SDUPTHER

## 2019-05-22 ENCOUNTER — TELEPHONE (OUTPATIENT)
Dept: INTERNAL MEDICINE | Facility: CLINIC | Age: 79
End: 2019-05-22

## 2019-05-22 NOTE — TELEPHONE ENCOUNTER
IS  DR CORRAL TAKING NEW PT?  . SHE HAS A FRIEND THAT IS NEW TO [place]  AND IS NEEDING A PHYSICIAN  CALL BACK # [phone]

## 2019-05-30 RX ORDER — LEVOTHYROXINE SODIUM 0.07 MG/1
75 TABLET ORAL DAILY
Qty: 90 TABLET | Refills: 1 | Status: SHIPPED | OUTPATIENT
Start: 2019-05-30 | End: 2019-10-17 | Stop reason: SDUPTHER

## 2019-06-10 NOTE — PROGRESS NOTES
"CHIEF COMPLAINT: \"Lower back, right hip, and right leg pain.\"    BRIEF HISTORY: Mrs. Alanna Rodriges is a 78 y.o. female, with a history of chronic right lower back and right lower extremity pain, which began without incident.  She returns today for re-evaluation.  Patient was last seen on 06/20/2018 for right L4-L5 and right L5-S1 transforaminal epidural steroid injections and experienced almost complete relief that lasted for 2 weeks.  The pain then progressively recurred.  Patient participated in Physical Therapy.  Pain starts in her lower back and radiates into the right hip, the lateral aspect of her thigh, and into the anterolateral aspect of her right right thigh and leg and into the dorsum of the right foot.  Patient describes the pain as aching, burning, and throbbing.    Pain level 2/10  Pain level ranges from 2/10-9/10  Pain increases with standing and walking  Pain decreases with sitting or lying down   Patient reports some numbness and weakness in the lower extremities.   Patient denies any new bladder or bowel problems. 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   Patient continues with an exercise regimen with a , Pilates weekly, and daily walks.     Review of previous therapies and additional medical records:  Alanna Rodriges has already failed the following measures, including:   Conservative measures: oral analgesics, opioids, topical analgesics, massage, physical therapy, ice, heat and supervised exercise program, chiropractor.   Interventional: As referenced above.  Right L5-S1 TESI on 02/21/2018.  Right SI joint + right GTB and right iliopectineal bursa injections on 09/19/2016.  Surgical: No previous lumbar spine surgery.  Right ulnar nerve decompression with Dr. Mccrary on 12/09/2016.   Right median nerve decompression at the wrist with Dr. Mccrary on 01/05/2018.  Alanna Rodriges underwent surgical  consultation with Dr. Villalta, who found her not to be a surgical candidate.   I have " reviewed her [facility] Report #[id number], consistent with medication reconciliation.    Review of Diagnostic Studies:   MRI of the lumbar spine- 02/22/2017:   L1-L2: Small disc bulge without significant canal or foraminal stenosis  L2-L3: Small disc bulge.  Asymmetric left foraminal/extra foraminal disc protrusion.  There is no significant canal or foraminal stenosis.  L3-L4: Small concentric disc bulge with superimposed left foraminal disc protrusion.  There is no canal or foraminal stenosis.  L4-L5: Small disc bulge with superimposed left foraminal disc protrusion.  There is no canal or foraminal stenosis.  L5-S1; Small concentric disc bulge with superimposed posterior central broad-based disc protrusion.  There is mild bilateral neuroforaminal stenosis. There is no canal stenosis.    Review of Systems   HENT: Positive for rhinorrhea.    Gastrointestinal: Positive for abdominal pain.   Musculoskeletal: Positive for arthralgias, back pain, gait problem, neck pain and neck stiffness.   Allergic/Immunologic: Positive for environmental allergies.   All other systems reviewed and are negative.     The following portions of the patient's history were reviewed and updated as appropriate: problem list, past medical history, past surgery history, social history, family history, medications, and allergies     /62 (BP Location: Right arm, Patient Position: Sitting, Cuff Size: Adult)   Pulse 86   Temp 96.9 °F (36.1 °C) (Temporal)   Resp 18   Wt 66.7 kg (147 lb)   SpO2 97%   BMI 25.23 kg/m²      Physical Exam   Neurologic Exam  Constitutional   General appearance: No acute distress.  Well appearing and well nourished.   Head and face: Normal.    Conjunctiva and lids: No swelling, erythema or discharge. Pupils: Equal, round, and reactive to light.   Neck: Supple, symmetric, trachea midline, no masses.   Pulmonary: No increased work of breathing or signs of respiratory distress.  Clear to auscultation bilaterally.    Cardiovascular: Normal rate and rhythm, normal S1 and S2, no murmurs. Peripheral vascular exam: Normal.   Musculoskeletal   Gait and station: Normal.   Lumbar spine: The range of motion of the lumbar spine is almost full and without pain. Lumbar facet joint loading maneuvers are negative.   Lenny and Gaenslen's tests are negative.   The range of motion of the hip joints is almost full and without pain.   Skin and subcutaneous tissue: Normal without rashes or lesions.   Neurologic   Cranial nerves: Cranial nerves II-XII intact.   Cortical function: Normal mental status.   Deep tendon reflexes: 2+ patellar bilaterally.  2+ Achilles bilaterally.  Negative long tract signs.  Straight leg raising test is negative.   Sensation: No sensory loss. Sensory exam: intact to light touch, intact pain and temperature sensation, intact vibration sensation and normal proprioception.   Coordination: Normal finger to nose and heel to shin. Coordination: normal balance and negative Romberg's sign.   Psychiatric: Judgment and insight: Normal.  Orientation to person, place, and time: Normal. Recent and remote memory: Intact. Mood and affect: Normal.      ASSESSMENT:   1. Neuroforaminal stenosis of spine    2. Displacement of intervertebral disc of lumbosacral region    3. Spondylolisthesis of lumbosacral region, L5-S1    4. DDD (degenerative disc disease), lumbar    5. Spondylosis of lumbar region without myelopathy or radiculopathy    6. Primary osteoarthritis of right hip    7. Leg length discrepancy       PLAN: I have reviewed all available medical records as well as previous therapies as referenced above under history of present illness, and discussed the patient with Dr. Tanner.  1.  Interventional pain management measures: Patient will be scheduled for repeat right L4-L5 and right L5-S1 transforaminal epidural steroid injections.  We may repeat the epidurals depending on patient outcome.  We would consider neurosurgical  consultation if she fails to experience sustained relief.  2. Pharmacological measures: Reviewed.   A. Continue gabapentin 100 mg four times a day  3. Long-term rehabilitation efforts:  A. Patient will start a comprehensive physical therapy program for reconditioning, water therapy, therapeutic exercise, core strengthening, gait and balance training, neurodynamics, ultrasound, ASTYM, E-STIM, myofascial release, and dry needling   B. Continue Pilates   C. Continue low impact exercise program with   D. Patient was referred to [facility] for custom made orthotics with 0.5 cm right shoe lift  4. The patient has been instructed to contact my office with any questions or difficulties. The patient understands the plan and agrees to proceed accordingly.     Patient Care Team:  Ismael Jones MD as PCP - General  Romaine Tanner MD as Consulting Physician (Pain Medicine)  Edmond Mccrary MD as Consulting Physician (Neurosurgery)  Sherice Carrington PA-C as Physician Assistant (Neurosurgery)     No orders of the defined types were placed in this encounter.        Future Appointments   Date Time Provider Department Center   7/1/2019  9:30 AM Romaine Tanner MD [facility] None   7/17/2019  8:30 AM America Diamond APRN [facility]   7/24/2019 10:45 AM Ismael Jones MD [facility]         Keny Jimenez PA-C       EMR Dragon/Transcription disclaimer:  Much of this encounter note is an electronic transcription of spoken language to printed text. Electronic transcription of spoken language may permit erroneous, or at times, nonsensical words or phrases to be inadvertently transcribed. Although I have reviewed the note for such errors, some may still exist.

## 2019-06-11 ENCOUNTER — OFFICE VISIT (OUTPATIENT)
Dept: PAIN MEDICINE | Facility: CLINIC | Age: 79
End: 2019-06-11

## 2019-06-11 VITALS
BODY MASS INDEX: 25.23 KG/M2 | OXYGEN SATURATION: 97 % | SYSTOLIC BLOOD PRESSURE: 110 MMHG | TEMPERATURE: 96.9 F | WEIGHT: 147 LBS | HEART RATE: 86 BPM | RESPIRATION RATE: 18 BRPM | DIASTOLIC BLOOD PRESSURE: 62 MMHG

## 2019-06-11 DIAGNOSIS — M47.816 SPONDYLOSIS OF LUMBAR REGION WITHOUT MYELOPATHY OR RADICULOPATHY: ICD-10-CM

## 2019-06-11 DIAGNOSIS — M21.70 LEG LENGTH DISCREPANCY: ICD-10-CM

## 2019-06-11 DIAGNOSIS — M51.27 DISPLACEMENT OF INTERVERTEBRAL DISC OF LUMBOSACRAL REGION: Primary | ICD-10-CM

## 2019-06-11 DIAGNOSIS — M48.00 NEUROFORAMINAL STENOSIS OF SPINE: Primary | ICD-10-CM

## 2019-06-11 DIAGNOSIS — M51.36 DDD (DEGENERATIVE DISC DISEASE), LUMBAR: ICD-10-CM

## 2019-06-11 DIAGNOSIS — M16.11 PRIMARY OSTEOARTHRITIS OF RIGHT HIP: ICD-10-CM

## 2019-06-11 DIAGNOSIS — M43.17 SPONDYLOLISTHESIS OF LUMBOSACRAL REGION: ICD-10-CM

## 2019-06-11 DIAGNOSIS — M51.27 DISPLACEMENT OF INTERVERTEBRAL DISC OF LUMBOSACRAL REGION: ICD-10-CM

## 2019-06-11 PROCEDURE — 99214 OFFICE O/P EST MOD 30 MIN: CPT | Performed by: PHYSICIAN ASSISTANT

## 2019-06-27 RX ORDER — ATORVASTATIN CALCIUM 10 MG/1
TABLET, FILM COATED ORAL
Qty: 90 TABLET | Refills: 0 | Status: SHIPPED | OUTPATIENT
Start: 2019-06-27 | End: 2019-08-20 | Stop reason: SDUPTHER

## 2019-07-01 ENCOUNTER — OUTSIDE FACILITY SERVICE (OUTPATIENT)
Dept: PAIN MEDICINE | Facility: CLINIC | Age: 79
End: 2019-07-01

## 2019-07-01 PROCEDURE — 64484 NJX AA&/STRD TFRM EPI L/S EA: CPT | Performed by: ANESTHESIOLOGY

## 2019-07-01 PROCEDURE — 99152 MOD SED SAME PHYS/QHP 5/>YRS: CPT | Performed by: ANESTHESIOLOGY

## 2019-07-01 PROCEDURE — 64483 NJX AA&/STRD TFRM EPI L/S 1: CPT | Performed by: ANESTHESIOLOGY

## 2019-07-02 ENCOUNTER — TELEPHONE (OUTPATIENT)
Dept: PAIN MEDICINE | Facility: CLINIC | Age: 79
End: 2019-07-02

## 2019-07-02 NOTE — TELEPHONE ENCOUNTER
Patient had right L4-L5 and right L3-L4 TFESI on 07/01/2019. Called patient to f/u post procedure. Reached voicemail. Left message for return call

## 2019-07-03 PROBLEM — R30.0 DYSURIA: Status: ACTIVE | Noted: 2019-07-03

## 2019-07-03 PROBLEM — F32.A DEPRESSION: Status: ACTIVE | Noted: 2019-07-03

## 2019-07-03 PROBLEM — J45.909 ASTHMA: Status: ACTIVE | Noted: 2019-07-03

## 2019-07-03 PROBLEM — M50.90 DISC DISORDER OF CERVICAL REGION: Status: ACTIVE | Noted: 2019-07-03

## 2019-07-03 PROBLEM — F41.1 ANXIETY STATE: Status: ACTIVE | Noted: 2019-07-03

## 2019-07-03 PROBLEM — E55.9 VITAMIN D DEFICIENCY: Status: ACTIVE | Noted: 2019-07-03

## 2019-07-03 PROBLEM — H91.93 BILATERAL HEARING LOSS: Status: ACTIVE | Noted: 2019-07-03

## 2019-07-03 PROBLEM — E03.9 HYPOTHYROIDISM: Status: ACTIVE | Noted: 2019-07-03

## 2019-07-03 PROBLEM — E78.2 MIXED HYPERLIPIDEMIA: Status: ACTIVE | Noted: 2019-07-03

## 2019-07-03 PROBLEM — Z00.00 MEDICARE ANNUAL WELLNESS VISIT, SUBSEQUENT: Status: ACTIVE | Noted: 2019-07-03

## 2019-07-03 PROBLEM — H61.23 IMPACTED CERUMEN, BILATERAL: Status: ACTIVE | Noted: 2019-07-03

## 2019-07-03 PROBLEM — K58.9 IRRITABLE BOWEL SYNDROME: Status: ACTIVE | Noted: 2019-07-03

## 2019-07-03 PROBLEM — M19.90 OSTEOARTHROSIS: Status: ACTIVE | Noted: 2019-07-03

## 2019-07-03 PROBLEM — K21.9 GERD (GASTROESOPHAGEAL REFLUX DISEASE): Status: ACTIVE | Noted: 2019-07-03

## 2019-07-03 PROBLEM — J06.9 URI, ACUTE: Status: ACTIVE | Noted: 2019-07-03

## 2019-07-03 PROBLEM — G44.209 ACUTE NON INTRACTABLE TENSION-TYPE HEADACHE: Status: ACTIVE | Noted: 2019-07-03

## 2019-07-09 ENCOUNTER — TELEPHONE (OUTPATIENT)
Dept: INTERNAL MEDICINE | Facility: CLINIC | Age: 79
End: 2019-07-09

## 2019-07-09 RX ORDER — DICYCLOMINE HYDROCHLORIDE 10 MG/1
10 CAPSULE ORAL NIGHTLY
Qty: 90 CAPSULE | Refills: 0 | Status: SHIPPED | OUTPATIENT
Start: 2019-07-09 | End: 2019-10-03 | Stop reason: SDUPTHER

## 2019-07-09 RX ORDER — CONJUGATED ESTROGENS 0.62 MG/1
TABLET, FILM COATED ORAL
Qty: 90 TABLET | Refills: 0 | Status: SHIPPED | OUTPATIENT
Start: 2019-07-09 | End: 2019-09-13 | Stop reason: SDUPTHER

## 2019-07-09 RX ORDER — RABEPRAZOLE SODIUM 20 MG/1
TABLET, DELAYED RELEASE ORAL
Qty: 90 TABLET | Refills: 0 | Status: SHIPPED | OUTPATIENT
Start: 2019-07-09 | End: 2019-09-09 | Stop reason: SDUPTHER

## 2019-07-09 NOTE — TELEPHONE ENCOUNTER
Reason for Call: MED REFILLS    Symptoms: N/A    Onset (when it began):N/A    Have they tried anything?N/A    Other pertinent info:    NEEDS REFLLS OF DICYCLOMINE 10 MG, RABEPRAZOLE 20 MG AND PREMARINE 0.625 SENT TO [facility]

## 2019-07-12 ENCOUNTER — TELEPHONE (OUTPATIENT)
Dept: INTERNAL MEDICINE | Facility: CLINIC | Age: 79
End: 2019-07-12

## 2019-07-15 DIAGNOSIS — E55.9 VITAMIN D DEFICIENCY: ICD-10-CM

## 2019-07-15 DIAGNOSIS — E03.9 HYPOTHYROIDISM, UNSPECIFIED TYPE: ICD-10-CM

## 2019-07-15 DIAGNOSIS — Z79.899 HIGH RISK MEDICATION USE: ICD-10-CM

## 2019-07-15 DIAGNOSIS — E78.2 MIXED HYPERLIPIDEMIA: Primary | ICD-10-CM

## 2019-07-15 DIAGNOSIS — G56.01 CARPAL TUNNEL SYNDROME OF RIGHT WRIST: ICD-10-CM

## 2019-07-16 ENCOUNTER — TRANSCRIBE ORDERS (OUTPATIENT)
Dept: INTERNAL MEDICINE | Facility: CLINIC | Age: 79
End: 2019-07-16

## 2019-07-16 DIAGNOSIS — Z12.31 VISIT FOR SCREENING MAMMOGRAM: Primary | ICD-10-CM

## 2019-07-17 ENCOUNTER — LAB (OUTPATIENT)
Dept: LAB | Facility: HOSPITAL | Age: 79
End: 2019-07-17

## 2019-07-17 ENCOUNTER — OFFICE VISIT (OUTPATIENT)
Dept: INTERNAL MEDICINE | Facility: CLINIC | Age: 79
End: 2019-07-17

## 2019-07-17 VITALS
DIASTOLIC BLOOD PRESSURE: 76 MMHG | HEIGHT: 64 IN | BODY MASS INDEX: 24.92 KG/M2 | WEIGHT: 146 LBS | SYSTOLIC BLOOD PRESSURE: 122 MMHG | HEART RATE: 76 BPM

## 2019-07-17 DIAGNOSIS — E55.9 VITAMIN D DEFICIENCY: ICD-10-CM

## 2019-07-17 DIAGNOSIS — Z00.00 MEDICARE ANNUAL WELLNESS VISIT, SUBSEQUENT: Primary | ICD-10-CM

## 2019-07-17 DIAGNOSIS — E78.2 MIXED HYPERLIPIDEMIA: Primary | ICD-10-CM

## 2019-07-17 DIAGNOSIS — Z79.899 HIGH RISK MEDICATION USE: ICD-10-CM

## 2019-07-17 DIAGNOSIS — Z78.0 POSTMENOPAUSAL: ICD-10-CM

## 2019-07-17 DIAGNOSIS — G56.01 CARPAL TUNNEL SYNDROME OF RIGHT WRIST: ICD-10-CM

## 2019-07-17 DIAGNOSIS — E03.9 HYPOTHYROIDISM, UNSPECIFIED TYPE: ICD-10-CM

## 2019-07-17 LAB
25(OH)D3 SERPL-MCNC: 91.5 NG/ML (ref 30–100)
ALBUMIN SERPL-MCNC: 4.7 G/DL (ref 3.5–5.2)
ALBUMIN/GLOB SERPL: 1.5 G/DL
ALP SERPL-CCNC: 64 U/L (ref 39–117)
ALT SERPL W P-5'-P-CCNC: 14 U/L (ref 1–33)
AMPHET+METHAMPHET UR QL: NEGATIVE
AMPHETAMINES UR QL: NEGATIVE
ANION GAP SERPL CALCULATED.3IONS-SCNC: 16.9 MMOL/L (ref 5–15)
AST SERPL-CCNC: 23 U/L (ref 1–32)
BARBITURATES UR QL SCN: NEGATIVE
BASOPHILS # BLD MANUAL: 0.23 10*3/MM3 (ref 0–0.2)
BASOPHILS NFR BLD AUTO: 3 % (ref 0–1.5)
BENZODIAZ UR QL SCN: NEGATIVE
BILIRUB SERPL-MCNC: 0.6 MG/DL (ref 0.2–1.2)
BUN BLD-MCNC: 21 MG/DL (ref 8–23)
BUN/CREAT SERPL: 24.4 (ref 7–25)
BUPRENORPHINE SERPL-MCNC: NEGATIVE NG/ML
CALCIUM SPEC-SCNC: 9.7 MG/DL (ref 8.6–10.5)
CANNABINOIDS SERPL QL: NEGATIVE
CHLORIDE SERPL-SCNC: 101 MMOL/L (ref 98–107)
CHOLEST SERPL-MCNC: 215 MG/DL (ref 0–200)
CO2 SERPL-SCNC: 23.1 MMOL/L (ref 22–29)
COCAINE UR QL: NEGATIVE
CREAT BLD-MCNC: 0.86 MG/DL (ref 0.57–1)
DEPRECATED RDW RBC AUTO: 49.4 FL (ref 37–54)
EOSINOPHIL # BLD MANUAL: 0.08 10*3/MM3 (ref 0–0.4)
EOSINOPHIL NFR BLD MANUAL: 1 % (ref 0.3–6.2)
ERYTHROCYTE [DISTWIDTH] IN BLOOD BY AUTOMATED COUNT: 12.7 % (ref 12.3–15.4)
GFR SERPL CREATININE-BSD FRML MDRD: 64 ML/MIN/1.73
GLOBULIN UR ELPH-MCNC: 3.2 GM/DL
GLUCOSE BLD-MCNC: 94 MG/DL (ref 65–99)
HCT VFR BLD AUTO: 47.3 % (ref 34–46.6)
HDLC SERPL-MCNC: 135 MG/DL (ref 40–60)
HGB BLD-MCNC: 15.2 G/DL (ref 12–15.9)
LDLC SERPL CALC-MCNC: 68 MG/DL (ref 0–100)
LDLC/HDLC SERPL: 0.5 {RATIO}
LYMPHOCYTES # BLD MANUAL: 2.27 10*3/MM3 (ref 0.7–3.1)
LYMPHOCYTES NFR BLD MANUAL: 12 % (ref 5–12)
LYMPHOCYTES NFR BLD MANUAL: 29 % (ref 19.6–45.3)
MCH RBC QN AUTO: 33.9 PG (ref 26.6–33)
MCHC RBC AUTO-ENTMCNC: 32.1 G/DL (ref 31.5–35.7)
MCV RBC AUTO: 105.6 FL (ref 79–97)
METHADONE UR QL SCN: NEGATIVE
MONOCYTES # BLD AUTO: 0.94 10*3/MM3 (ref 0.1–0.9)
NEUTROPHILS # BLD AUTO: 4.31 10*3/MM3 (ref 1.7–7)
NEUTROPHILS NFR BLD MANUAL: 55 % (ref 42.7–76)
OPIATES UR QL: NEGATIVE
OXYCODONE UR QL SCN: NEGATIVE
PCP UR QL SCN: NEGATIVE
PLAT MORPH BLD: NORMAL
PLATELET # BLD AUTO: 220 10*3/MM3 (ref 140–450)
PMV BLD AUTO: 11.7 FL (ref 6–12)
POTASSIUM BLD-SCNC: 4.1 MMOL/L (ref 3.5–5.2)
PROPOXYPH UR QL: NEGATIVE
PROT SERPL-MCNC: 7.9 G/DL (ref 6–8.5)
RBC # BLD AUTO: 4.48 10*6/MM3 (ref 3.77–5.28)
RBC MORPH BLD: NORMAL
SODIUM BLD-SCNC: 141 MMOL/L (ref 136–145)
TRICYCLICS UR QL SCN: NEGATIVE
TRIGL SERPL-MCNC: 61 MG/DL (ref 0–150)
TSH SERPL DL<=0.05 MIU/L-ACNC: 3.23 MIU/ML (ref 0.27–4.2)
VIT B12 BLD-MCNC: 934 PG/ML (ref 211–946)
VLDLC SERPL-MCNC: 12.2 MG/DL (ref 5–40)
WBC MORPH BLD: NORMAL
WBC NRBC COR # BLD: 7.83 10*3/MM3 (ref 3.4–10.8)

## 2019-07-17 PROCEDURE — 82306 VITAMIN D 25 HYDROXY: CPT

## 2019-07-17 PROCEDURE — G0439 PPPS, SUBSEQ VISIT: HCPCS | Performed by: NURSE PRACTITIONER

## 2019-07-17 PROCEDURE — 80061 LIPID PANEL: CPT

## 2019-07-17 PROCEDURE — 84443 ASSAY THYROID STIM HORMONE: CPT

## 2019-07-17 PROCEDURE — 85025 COMPLETE CBC W/AUTO DIFF WBC: CPT

## 2019-07-17 PROCEDURE — 85007 BL SMEAR W/DIFF WBC COUNT: CPT

## 2019-07-17 PROCEDURE — 82607 VITAMIN B-12: CPT

## 2019-07-17 PROCEDURE — 80306 DRUG TEST PRSMV INSTRMNT: CPT

## 2019-07-17 PROCEDURE — 80053 COMPREHEN METABOLIC PANEL: CPT

## 2019-07-17 RX ORDER — ACYCLOVIR 400 MG/1
400 TABLET ORAL 2 TIMES DAILY
COMMUNITY
End: 2019-11-13 | Stop reason: SDUPTHER

## 2019-07-17 NOTE — PROGRESS NOTES
QUICK REFERENCE INFORMATION:  The ABCs of the Annual Wellness Visit    Subsequent Medicare Wellness Visit    HEALTH RISK ASSESSMENT    1940    Recent Hospitalizations:  No hospitalization(s) within the last year..        Current Medical Providers:  Patient Care Team:  Ismael Jones MD as PCP - General  Romaine Tanner MD as Consulting Physician (Pain Medicine)  Edmond Mcrcary MD as Consulting Physician (Neurosurgery)  Sherice Carrington PA-C as Physician Assistant (Neurosurgery)        Smoking Status:  Social History     Tobacco Use   Smoking Status Former Smoker   • Types: Cigarettes   • Last attempt to quit:    • Years since quittin.5   Smokeless Tobacco Never Used   Tobacco Comment    quit smoking in her 40's        Alcohol Consumption:  Social History     Substance and Sexual Activity   Alcohol Use Yes   • Alcohol/week: 8.4 oz   • Types: 14 Glasses of wine per week   • Frequency: 4 or more times a week   • Drinks per session: 1 or 2       Depression Screen:   PHQ-2/PHQ-9 Depression Screening 2019   Little interest or pleasure in doing things 0   Feeling down, depressed, or hopeless 0   Total Score 0       Health Habits and Functional and Cognitive Screening:  Functional & Cognitive Status 2019   Do you have difficulty preparing food and eating? No   Do you have difficulty bathing yourself, getting dressed or grooming yourself? No   Do you have difficulty using the toilet? No   Do you have difficulty moving around from place to place? No   Do you have trouble with steps or getting out of a bed or a chair? No   Current Diet Well Balanced Diet   Dental Exam Up to date   Eye Exam Up to date   Exercise (times per week) 4 times per week   Do you need help using the phone?  No   Are you deaf or do you have serious difficulty hearing?  No   Do you need help with transportation? No   Do you need help shopping? No   Do you need help preparing meals?  No   Do you need help with  housework?  Yes   Do you need help with laundry? No   Do you need help taking your medications? No   Do you need help managing money? No   Do you ever drive or ride in a car without wearing a seat belt? No   Have you felt unusual stress, anger or loneliness in the last month? No   Who do you live with? Spouse   If you need help, do you have trouble finding someone available to you? No   Have you been bothered in the last four weeks by sexual problems? No   Do you have difficulty concentrating, remembering or making decisions? No           Does the patient have evidence of cognitive impairment? No            Aspirin use counseling: Taking ASA appropriately as indicated      Recent Lab Results:  CMP:  Lab Results   Component Value Date    BUN 12 07/19/2018    CREATININE 0.89 07/19/2018    EGFRIFNONA 62 07/19/2018    BCR 13.5 07/19/2018     07/19/2018    K 4.7 07/19/2018    CO2 19.0 (L) 07/19/2018    CALCIUM 8.7 07/19/2018    ALBUMIN 4.36 07/19/2018    BILITOT 0.6 07/19/2018    ALKPHOS 50 07/19/2018    AST 32 07/19/2018    ALT 17 07/19/2018     HbA1c:  No results found for: HGBA1C  Microalbumin:  No results found for: MICROALBUR, POCMALB, POCCREAT  Lipid Panel  Lab Results   Component Value Date    CHOL 208 (H) 07/19/2018    TRIG 66 07/19/2018     (H) 07/19/2018    LDL 84 07/19/2018    AST 32 07/19/2018    ALT 17 07/19/2018       Visual Acuity:  No exam data present    Age-appropriate Screening Schedule:  Refer to the list below for future screening recommendations based on patient's age, sex and/or medical conditions. Orders for these recommended tests are listed in the plan section. The patient has been provided with a written plan.    Health Maintenance   Topic Date Due   • COLONOSCOPY  05/01/2017   • ZOSTER VACCINE (3 of 3) 09/19/2018   • LIPID PANEL  07/19/2019   • INFLUENZA VACCINE  08/01/2019   • MAMMOGRAM  08/15/2020   • TDAP/TD VACCINES (2 - Td) 09/13/2022   • PNEUMOCOCCAL VACCINES (65+ LOW/MEDIUM  RISK)  Completed        Subjective   History of Present Illness    Alanna Rodriges is a 78 y.o. female who presents for a Subsequent Wellness Visit.    CHRONIC CONDITIONS    The following portions of the patient's history were reviewed and updated as appropriate: allergies, current medications, past family history, past medical history, past social history, past surgical history and problem list.    Outpatient Medications Prior to Visit   Medication Sig Dispense Refill   • acyclovir (ZOVIRAX) 400 MG tablet Take 400 mg by mouth 2 (Two) Times a Day. Take no more than 5 doses a day.     • aspirin 81 MG EC tablet Take 81 mg by mouth Daily. Last dose 11/28/2016     • atorvastatin (LIPITOR) 10 MG tablet TAKE 1 TABLET BY MOUTH  EVERY DAY 90 tablet 0   • cholecalciferol (VITAMIN D3) 1000 units tablet Take 1,000 Units by mouth Daily.     • dicyclomine (BENTYL) 10 MG capsule TAKE 1 CAPSULE BY MOUTH  EVERY NIGHT 90 capsule 0   • gabapentin (NEURONTIN) 100 MG capsule Take 1 cap po  capsule 0   • ketoconazole (NIZORAL) 2 % cream Apply 1 application topically Every 12 (Twelve) Hours. Shampoo weekly     • ketoconazole (NIZORAL) 2 % shampoo Apply  topically 1 (One) Time Per Week.     • levothyroxine (SYNTHROID, LEVOTHROID) 75 MCG tablet TAKE 1 TABLET BY MOUTH  DAILY 90 tablet 1   • Omega-3 Fatty Acids (FISH OIL PO) Take 1 capsule by mouth 2 (Two) Times a Day. Stopped one week prior to surgery     • PREMARIN 0.625 MG tablet TAKE 1 TABLET BY MOUTH  EVERY DAY 90 tablet 0   • RABEprazole (ACIPHEX) 20 MG EC tablet TAKE 1 TABLET BY MOUTH  EVERY DAY 90 tablet 0   • ALPRAZolam (XANAX) 0.25 MG tablet Take 0.25 mg by mouth As Needed for anxiety.     • Dietary Management Product (RHEUMATE) capsule TAKE ONE CAPSULE BY MOUTH 2 TIMES A  capsule 3     No facility-administered medications prior to visit.        Patient Active Problem List   Diagnosis   • DDD (degenerative disc disease), lumbar   • Primary osteoarthritis of right hip   •  History of ulcerative colitis   • Spondylolisthesis of lumbosacral region, L5-S1   • Spondylosis of lumbar region without myelopathy or radiculopathy   • Neuroforaminal stenosis of spine   • Leg length discrepancy   • Ulnar neuropathy at elbow of right upper extremity   • CTS (carpal tunnel syndrome)   • Carpal tunnel syndrome of right wrist   • Displacement of intervertebral disc of lumbosacral region   • Anxiety state   • Acute non intractable tension-type headache   • Asthma   • Bilateral hearing loss   • BMI 26.0-26.9,adult   • Depression   • Dysuria   • GERD (gastroesophageal reflux disease)   • Hypothyroidism   • Impacted cerumen, bilateral   • Irritable bowel syndrome   • Medicare annual wellness visit, subsequent   • Mixed hyperlipidemia   • Osteoarthrosis   • Disc disorder of cervical region   • URI, acute   • Vitamin D deficiency       Advance Care Planning:  Patient has an advance directive - a copy has been provided and is visible in patient header    Identification of Risk Factors:  Risk factors include: Fall Risk  Osteoprorosis Risk.    Review of Systems   Constitutional: Negative for chills, fatigue and fever.   HENT: Negative for congestion, ear pain and sinus pressure.    Respiratory: Negative for cough, chest tightness, shortness of breath and wheezing.    Cardiovascular: Negative for chest pain and palpitations.   Gastrointestinal: Negative for abdominal pain, blood in stool and constipation.   Skin: Negative for color change.   Allergic/Immunologic: Negative for environmental allergies.   Neurological: Negative for dizziness, speech difficulty and headaches.   Psychiatric/Behavioral: Negative for confusion. The patient is not nervous/anxious.        Compared to one year ago, the patient feels her physical health is the same.  Compared to one year ago, the patient feels her mental health is the same.      Vitals:    07/17/19 0846   BP: 122/76   BP Location: Left arm   Patient Position: Sitting  "  Cuff Size: Adult   Pulse: 76   Weight: 66.2 kg (146 lb)   Height: 162.6 cm (64\")   PainSc: 0-No pain       Patient's Body mass index is 25.06 kg/m². BMI is within normal parameters. No follow-up required..      Assessment/Plan   Problem List Items Addressed This Visit        Other    Medicare annual wellness visit, subsequent - Primary    Relevant Orders    DEXA Bone Density Axial      Other Visit Diagnoses     Postmenopausal        Relevant Orders    DEXA Bone Density Axial        Patient Self-Management and Personalized Health Advice  The patient has been provided with information about: fall prevention and preventive services including:   · Annual Wellness Visit (AWV)  · Bone Density Measurements  · Influenza Vaccine and Administration.    Outpatient Encounter Medications as of 7/17/2019   Medication Sig Dispense Refill   • acyclovir (ZOVIRAX) 400 MG tablet Take 400 mg by mouth 2 (Two) Times a Day. Take no more than 5 doses a day.     • aspirin 81 MG EC tablet Take 81 mg by mouth Daily. Last dose 11/28/2016     • atorvastatin (LIPITOR) 10 MG tablet TAKE 1 TABLET BY MOUTH  EVERY DAY 90 tablet 0   • cholecalciferol (VITAMIN D3) 1000 units tablet Take 1,000 Units by mouth Daily.     • dicyclomine (BENTYL) 10 MG capsule TAKE 1 CAPSULE BY MOUTH  EVERY NIGHT 90 capsule 0   • gabapentin (NEURONTIN) 100 MG capsule Take 1 cap po  capsule 0   • ketoconazole (NIZORAL) 2 % cream Apply 1 application topically Every 12 (Twelve) Hours. Shampoo weekly     • ketoconazole (NIZORAL) 2 % shampoo Apply  topically 1 (One) Time Per Week.     • levothyroxine (SYNTHROID, LEVOTHROID) 75 MCG tablet TAKE 1 TABLET BY MOUTH  DAILY 90 tablet 1   • Omega-3 Fatty Acids (FISH OIL PO) Take 1 capsule by mouth 2 (Two) Times a Day. Stopped one week prior to surgery     • PREMARIN 0.625 MG tablet TAKE 1 TABLET BY MOUTH  EVERY DAY 90 tablet 0   • RABEprazole (ACIPHEX) 20 MG EC tablet TAKE 1 TABLET BY MOUTH  EVERY DAY 90 tablet 0   • " [DISCONTINUED] ALPRAZolam (XANAX) 0.25 MG tablet Take 0.25 mg by mouth As Needed for anxiety.     • [DISCONTINUED] Dietary Management Product (RHEUMATE) capsule TAKE ONE CAPSULE BY MOUTH 2 TIMES A  capsule 3     No facility-administered encounter medications on file as of 7/17/2019.        Reviewed use of high risk medication in the elderly: no  Reviewed for potential of harmful drug interactions in the elderly: no    Follow Up:  Return for Annual physical, Labs this visit, Next scheduled follow up.     An After Visit Summary and PPPS with all of these plans were given to the patient.

## 2019-07-17 NOTE — PATIENT INSTRUCTIONS
Medicare Wellness  Personal Prevention Plan of Service     Date of Office Visit:  2019  Encounter Provider:  DIEGO Hwang  Place of Service:  [facility] INTERNAL MEDICINE  Patient Name: Alanan Rodriges  :  1940    As part of the Medicare Wellness portion of your visit today, we are providing you with this personalized preventive plan of services (PPPS). This plan is based upon recommendations of the [place] Preventive Services Task Force (USPSTF) and the Advisory Committee on Immunization Practices (ACIP).    This lists the preventive care services that should be considered, and provides dates of when you are due. Items listed as completed are up-to-date and do not require any further intervention.    Health Maintenance   Topic Date Due   • COLONOSCOPY  2017   • ZOSTER VACCINE (3 of 3) 2018   • MEDICARE ANNUAL WELLNESS  2019   • LIPID PANEL  2019   • INFLUENZA VACCINE  2019   • MAMMOGRAM  08/15/2020   • TDAP/TD VACCINES (2 - Td) 2022   • PNEUMOCOCCAL VACCINES (65+ LOW/MEDIUM RISK)  Completed       Orders Placed This Encounter   Procedures   • DEXA Bone Density Axial     Standing Status:   Future     Standing Expiration Date:   2020     Order Specific Question:   Reason for Exam:     Answer:   postmenopausal       Return for Annual physical, Labs this visit, Next scheduled follow up.

## 2019-07-22 NOTE — PROGRESS NOTES
"CHIEF COMPLAINT: \"The epidural was very helpful.\"    BRIEF HISTORY: Mrs. Alanna Rodriges is a 78 y.o. female, with a history of chronic right lower back and right lower extremity pain, which began without incident.  She returns today for post-procedure follow-up.  Patient was last seen on 07/01/2019 for repeat right L4-L5 and right L5-S1 transforaminal epidural steroid injections and experienced 95% relief that is ongoing.  Patient will be starting Physical Therapy next week.  Pain starts in her lower back and radiated into the right hip, the lateral aspect of her thigh, and into the anterolateral aspect of her right thigh and leg and into the dorsum of the right foot.  Patient describes the pain as aching, burning, and throbbing.    Pain level 0/10  Pain level ranges from 0/10-7/10  Pain increased with standing and walking  Pain decreases with sitting or lying down   Patient reports some numbness and weakness in the lower extremities.   Patient denies any new bladder or bowel problems. 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   Patient continues with an exercise regimen with a , Pilates weekly, and daily walks.     Review of previous therapies and additional medical records:  Alanna Rodriges has already failed the following measures, including:   Conservative measures: oral analgesics, opioids, topical analgesics, massage, physical therapy, ice, heat and supervised exercise program, chiropractor.   Interventional: As referenced above.  Right L4-L5 and right L5-S1 TESI on 06/20/2018.  Right L5-S1 TESI on 02/21/2018.  Right SI joint + right GTB and right iliopectineal bursa injections on 09/19/2016.  Surgical: No previous lumbar spine surgery.  Right ulnar nerve decompression with Dr. Mccrary on 12/09/2016.   Right median nerve decompression at the wrist with Dr. Mccrary on 01/05/2018.  Alanna Rodriges underwent surgical  consultation with Dr. Villalta, who found her not to be a surgical candidate.   I have " "reviewed her [facility] Report #[id number], consistent with medication reconciliation.    Review of Diagnostic Studies:   MRI of the lumbar spine- 02/22/2017:   L1-L2: Small disc bulge without significant canal or foraminal stenosis  L2-L3: Small disc bulge.  Asymmetric left foraminal/extra foraminal disc protrusion.  There is no significant canal or foraminal stenosis.  L3-L4: Small concentric disc bulge with superimposed left foraminal disc protrusion.  There is no canal or foraminal stenosis.  L4-L5: Small disc bulge with superimposed left foraminal disc protrusion.  There is no canal or foraminal stenosis.  L5-S1; Small concentric disc bulge with superimposed posterior central broad-based disc protrusion.  There is mild bilateral neuroforaminal stenosis. There is no canal stenosis.    Review of Systems   All other systems reviewed and are negative.     The following portions of the patient's history were reviewed and updated as appropriate: problem list, past medical history, past surgery history, social history, family history, medications, and allergies     Ht 162.6 cm (64\")   Wt 65.8 kg (145 lb)   BMI 24.89 kg/m²      Physical Exam   Neurologic Exam  Constitutional   General appearance: No acute distress.  Well appearing and well nourished.   Head and face: Normal.    Conjunctiva and lids: No swelling, erythema or discharge. Pupils: Equal, round, and reactive to light.   Neck: Supple, symmetric, trachea midline, no masses.   Pulmonary: No increased work of breathing or signs of respiratory distress.  Clear to auscultation bilaterally.   Cardiovascular: Normal rate and rhythm, normal S1 and S2, no murmurs. Peripheral vascular exam: Normal.   Musculoskeletal   Gait and station: Normal.   Lumbar spine: The range of motion of the lumbar spine is almost full and without pain. Lumbar facet joint loading maneuvers are negative.   Lenny and Gaenslen's tests are negative.   The range of motion of the hip joints is " almost full and without pain.   Skin and subcutaneous tissue: Normal without rashes or lesions.   Neurologic   Cranial nerves: Cranial nerves II-XII intact.   Cortical function: Normal mental status.   Deep tendon reflexes: 1+ patellar bilaterally.  1+ Achilles bilaterally.  Negative long tract signs.  Straight leg raising test is negative.   Sensation: No sensory loss. Sensory exam: intact to light touch, intact pain and temperature sensation, intact vibration sensation and normal proprioception.   Coordination: Normal finger to nose and heel to shin. Coordination: normal balance and negative Romberg's sign.   Psychiatric: Judgment and insight: Normal.  Orientation to person, place, and time: Normal. Recent and remote memory: Intact. Mood and affect: Normal.      ASSESSMENT:   1. Neuroforaminal stenosis of spine    2. Displacement of intervertebral disc of lumbosacral region    3. Spondylolisthesis of lumbosacral region, L5-S1    4. DDD (degenerative disc disease), lumbar    5. Spondylosis of lumbar region without myelopathy or radiculopathy    6. Primary osteoarthritis of right hip    7. Leg length discrepancy       PLAN: I have reviewed all available medical records as well as previous therapies as referenced above under history of present illness, and discussed the patient with Dr. Tanner.  1.  Interventional pain management measures: None indicated.  Patient could be scheduled for repeat right L4-L5 and right L5-S1 transforaminal epidural steroid injections if pain recurs.  We would consider neurosurgical consultation if she continued to struggle with pain.  2. Pharmacological measures: Reviewed.   A. Continue gabapentin 100 mg four times a day  3. Long-term rehabilitation efforts:  A. Patient will start a comprehensive physical therapy program for reconditioning, water therapy, therapeutic exercise, core strengthening, gait and balance training, neurodynamics, ultrasound, ASTYM, E-STIM, myofascial release,  and dry needling   B. Continue Pilates   C. Continue low impact exercise program with   D. Wear custom made orthotics with 0.5 cm right shoe lift  4. The patient has been instructed to contact my office with any questions or difficulties. The patient understands the plan and agrees to proceed accordingly.     Patient Care Team:  Ismael Jones MD as PCP - Romaine Hodges MD as Consulting Physician (Pain Medicine)  Edmond Mccrary MD as Consulting Physician (Neurosurgery)  Sherice Carrington PA-C as Physician Assistant (Neurosurgery)     No orders of the defined types were placed in this encounter.        Future Appointments   Date Time Provider Department Center   7/24/2019 10:45 AM Ismael Jones MD [facility]   8/6/2019 10:20 AM Edmond Mccrary MD [facility] None   9/18/2019  2:00 PM [facility] DEXA 1 BH [facility] DX [facility]   9/18/2019  2:40 PM [facility] MAMM 1  [facility] BR [facility] [place]         Keny Jimenez PA-C       EMR Dragon/Transcription disclaimer:  Much of this encounter note is an electronic transcription of spoken language to printed text. Electronic transcription of spoken language may permit erroneous, or at times, nonsensical words or phrases to be inadvertently transcribed. Although I have reviewed the note for such errors, some may still exist.

## 2019-07-23 ENCOUNTER — OFFICE VISIT (OUTPATIENT)
Dept: PAIN MEDICINE | Facility: CLINIC | Age: 79
End: 2019-07-23

## 2019-07-23 VITALS — BODY MASS INDEX: 24.75 KG/M2 | HEIGHT: 64 IN | WEIGHT: 145 LBS

## 2019-07-23 DIAGNOSIS — M51.36 DDD (DEGENERATIVE DISC DISEASE), LUMBAR: ICD-10-CM

## 2019-07-23 DIAGNOSIS — M43.17 SPONDYLOLISTHESIS OF LUMBOSACRAL REGION: ICD-10-CM

## 2019-07-23 DIAGNOSIS — M51.27 DISPLACEMENT OF INTERVERTEBRAL DISC OF LUMBOSACRAL REGION: ICD-10-CM

## 2019-07-23 DIAGNOSIS — M47.816 SPONDYLOSIS OF LUMBAR REGION WITHOUT MYELOPATHY OR RADICULOPATHY: ICD-10-CM

## 2019-07-23 DIAGNOSIS — M16.11 PRIMARY OSTEOARTHRITIS OF RIGHT HIP: ICD-10-CM

## 2019-07-23 DIAGNOSIS — M21.70 LEG LENGTH DISCREPANCY: ICD-10-CM

## 2019-07-23 DIAGNOSIS — M48.00 NEUROFORAMINAL STENOSIS OF SPINE: Primary | ICD-10-CM

## 2019-07-23 PROCEDURE — 99213 OFFICE O/P EST LOW 20 MIN: CPT | Performed by: PHYSICIAN ASSISTANT

## 2019-08-01 ENCOUNTER — OFFICE VISIT (OUTPATIENT)
Dept: INTERNAL MEDICINE | Facility: CLINIC | Age: 79
End: 2019-08-01

## 2019-08-01 VITALS
HEIGHT: 64 IN | HEART RATE: 88 BPM | SYSTOLIC BLOOD PRESSURE: 122 MMHG | DIASTOLIC BLOOD PRESSURE: 74 MMHG | WEIGHT: 147 LBS | BODY MASS INDEX: 25.1 KG/M2

## 2019-08-01 DIAGNOSIS — E55.9 VITAMIN D DEFICIENCY: ICD-10-CM

## 2019-08-01 DIAGNOSIS — K58.2 IRRITABLE BOWEL SYNDROME WITH BOTH CONSTIPATION AND DIARRHEA: ICD-10-CM

## 2019-08-01 DIAGNOSIS — M50.90 DISC DISORDER OF CERVICAL REGION: ICD-10-CM

## 2019-08-01 DIAGNOSIS — M47.816 SPONDYLOSIS OF LUMBAR REGION WITHOUT MYELOPATHY OR RADICULOPATHY: ICD-10-CM

## 2019-08-01 DIAGNOSIS — E03.9 ACQUIRED HYPOTHYROIDISM: ICD-10-CM

## 2019-08-01 DIAGNOSIS — E78.2 MIXED HYPERLIPIDEMIA: Primary | ICD-10-CM

## 2019-08-01 DIAGNOSIS — F41.1 ANXIETY STATE: ICD-10-CM

## 2019-08-01 PROCEDURE — 99214 OFFICE O/P EST MOD 30 MIN: CPT | Performed by: INTERNAL MEDICINE

## 2019-08-01 NOTE — PROGRESS NOTES
[facility]     Alanna Rodriges  1940   [id number]      Patient Care Team:  Ismael Jones MD as PCP - General  Romaine Ramos MD as Consulting Physician (Pain Medicine)  Edmond Mccrary MD as Consulting Physician (Neurosurgery)  Sherice Carrington PA-C as Physician Assistant (Neurosurgery)    Chief Complaint::   Chief Complaint   Patient presents with   • Hyperlipidemia   • Asthma   • Hypothyroidism        HPI  Ms. Rodriges is now 78.  She comes in for follow-up of her lumbar spondylosis, cervical spondylosis, anxiety, hyperlipidemia, vitamin D deficiency, hypothyroidism, irritable bowel syndrome and for part 2 of her annual wellness visit.  Followed by Dr. Ramos for pain management.  She also is seeing physical therapy when she needs to.  Overall she feels well.  Her anxiety is reasonably well controlled.    Chronic Conditions:      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 or radiculopathy   • Neuroforaminal stenosis of spine   • Leg length discrepancy   • Ulnar neuropathy at elbow of right upper extremity   • CTS (carpal tunnel syndrome)   • Carpal tunnel syndrome of right wrist   • Displacement of intervertebral disc of lumbosacral region   • Anxiety state   • Acute non intractable tension-type headache   • Asthma   • Bilateral hearing loss   • BMI 26.0-26.9,adult   • Depression   • Dysuria   • GERD (gastroesophageal reflux disease)   • Hypothyroidism   • Impacted cerumen, bilateral   • Irritable bowel syndrome   • Medicare annual wellness visit, subsequent   • Mixed hyperlipidemia   • Osteoarthrosis   • Disc disorder of cervical region   • Vitamin D deficiency        Past Medical History:   Diagnosis Date   • Anemia    • Anxiety disorder    • Arthritis    • Cataract     • Depression    • Dermatitis    • DVT (deep venous thrombosis) (CMS/HCC)        • Elbow pain    • GERD (gastroesophageal reflux disease)    • History of blood transfusion    • History of hepatitis C virus infection    • Hypothyroidism    • Kidney stones    • Migraine     occular   • PONV (postoperative nausea and vomiting)    • Ptosis due to aging    • Seborrhea    • Seizure (CMS/HCC)     during hospitalization as a side effect of Tofranil    • Shoulder pain     right   • Ulcerative colitis (CMS/HCC)    • Ulnar nerve compression, right        Past Surgical History:   Procedure Laterality Date   • BUNIONECTOMY Bilateral    • CARPAL TUNNEL RELEASE Right 2018    Procedure: CARPAL TUNNEL RELEASE RIGHT ;  Surgeon: Edmond Mccrary MD;  Location:  [facility] OR;  Service:    • CARPAL TUNNEL RELEASE Right    • COLECTOMY TOTAL     • COLON SURGERY      resection   • D&C AND LAPAROSCOPY     • FACELIFT  2015    chemical peel   • HYSTERECTOMY  1994    bso   • ILEOSTOMY     • SKIN BIOPSY     • ULNAR NERVE DECOMPRESSION Right 2016    Procedure: RIGHT ULNAR NERVE DECOMPRESSION;  Surgeon: Edmond Mccrary MD;  Location:  [facility] OR;  Service:    • URETHRAL DILATION      multiple       Family History   Problem Relation Age of Onset   • Alzheimer's disease Other    • Leukemia Other    • Cancer Maternal Grandmother    • Coronary artery disease Maternal Grandfather    • Breast cancer Neg Hx    • Ovarian cancer Neg Hx        Social History     Socioeconomic History   • Marital status:      Spouse name: Not on file   • Number of children: Not on file   • Years of education: Not on file   • Highest education level: Not on file   Tobacco Use   • Smoking status: Former Smoker     Types: Cigarettes     Last attempt to quit:      Years since quittin.6   • Smokeless tobacco: Never Used   • Tobacco comment: quit smoking in her 40's    Substance and Sexual Activity   • Alcohol use: Yes     Alcohol/week:  8.4 oz     Types: 14 Glasses of wine per week     Frequency: 4 or more times a week     Drinks per session: 1 or 2   • Drug use: No   • Sexual activity: Defer       Allergies   Allergen Reactions   • Codeine Nausea Only   • Morphine And Related Hallucinations     And itching After 3 days         Current Outpatient Medications:   •  acyclovir (ZOVIRAX) 400 MG tablet, Take 400 mg by mouth 2 (Two) Times a Day. Take no more than 5 doses a day., Disp: , Rfl:   •  aspirin 81 MG EC tablet, Take 81 mg by mouth Daily. Last dose 11/28/2016, Disp: , Rfl:   •  atorvastatin (LIPITOR) 10 MG tablet, TAKE 1 TABLET BY MOUTH  EVERY DAY (Patient taking differently: TAKE 1 TABLET MON, WED, FRI), Disp: 90 tablet, Rfl: 0  •  cholecalciferol (VITAMIN D3) 1000 units tablet, Take 1,000 Units by mouth Daily., Disp: , Rfl:   •  dicyclomine (BENTYL) 10 MG capsule, TAKE 1 CAPSULE BY MOUTH  EVERY NIGHT, Disp: 90 capsule, Rfl: 0  •  gabapentin (NEURONTIN) 100 MG capsule, Take 1 cap po QID (Patient taking differently: Take 100 mg by mouth 3 (Three) Times a Day. Take 1 cap po QID), Disp: 360 capsule, Rfl: 0  •  ketoconazole (NIZORAL) 2 % cream, Apply 1 application topically Every 12 (Twelve) Hours. Shampoo weekly, Disp: , Rfl:   •  ketoconazole (NIZORAL) 2 % shampoo, Apply  topically 1 (One) Time Per Week., Disp: , Rfl:   •  levothyroxine (SYNTHROID, LEVOTHROID) 75 MCG tablet, TAKE 1 TABLET BY MOUTH  DAILY, Disp: 90 tablet, Rfl: 1  •  Omega-3 Fatty Acids (FISH OIL PO), Take 1 capsule by mouth Daily. Stopped one week prior to surgery, Disp: , Rfl:   •  PREMARIN 0.625 MG tablet, TAKE 1 TABLET BY MOUTH  EVERY DAY, Disp: 90 tablet, Rfl: 0  •  RABEprazole (ACIPHEX) 20 MG EC tablet, TAKE 1 TABLET BY MOUTH  EVERY DAY, Disp: 90 tablet, Rfl: 0    Review of Systems   Constitutional: Negative for chills, fatigue and fever.   HENT: Negative for congestion, ear pain and sinus pressure.    Respiratory: Negative for cough, chest tightness, shortness of breath and  "wheezing.    Cardiovascular: Negative for chest pain and palpitations.   Gastrointestinal: Negative for abdominal pain, blood in stool and constipation.   Skin: Negative for color change.   Allergic/Immunologic: Negative for environmental allergies.   Neurological: Negative for dizziness, speech difficulty and headache.   Psychiatric/Behavioral: Negative for decreased concentration. The patient is not nervous/anxious.         Vital Signs  Vitals:    08/01/19 1440   BP: 122/74   BP Location: Left arm   Patient Position: Sitting   Cuff Size: Adult   Pulse: 88   Weight: 66.7 kg (147 lb)   Height: 162.6 cm (64.02\")   PainSc:   2   PainLoc: Back       Physical Exam   Constitutional: She is oriented to person, place, and time. She appears well-developed and well-nourished.   HENT:   Head: Normocephalic and atraumatic.   Right Ear: External ear normal.   Left Ear: External ear normal.   Mouth/Throat: Oropharynx is clear and moist.   Neck: Normal range of motion. Neck supple. No thyromegaly present.   Cardiovascular: Normal rate, regular rhythm and normal heart sounds.   No murmur heard.  Pulmonary/Chest: Effort normal and breath sounds normal.   Abdominal: Soft. Bowel sounds are normal.   Musculoskeletal: She exhibits no edema.   Neurological: She is alert and oriented to person, place, and time. She displays normal reflexes. No cranial nerve deficit or sensory deficit. She exhibits normal muscle tone. Coordination normal.   Skin: Skin is warm and dry.   Psychiatric: She has a normal mood and affect. Her behavior is normal. Judgment and thought content normal.   Vitals reviewed.     Procedures    ACE III MINI             Assessment/Plan:    Alanna was seen today for hyperlipidemia, asthma and hypothyroidism.    Diagnoses and all orders for this visit:    Mixed hyperlipidemia    Vitamin D deficiency    Irritable bowel syndrome with both constipation and diarrhea    Acquired hypothyroidism    Spondylosis of lumbar region " without myelopathy or radiculopathy    Disc disorder of cervical region    Anxiety state    Plan    Lipid panel is well controlled.  She will continue atorvastatin Monday Wednesday and Friday cholesterol diet.    Vitamin D is controlled, continue current 1000 units of vitamin D daily.    IBS symptoms are stable 6.    TSH is normal continue current levothyroxine dose.    Back and neck pain are reasonably well controlled to new gabapentin.    Anxiety is controlled      Plan of care reviewed with patient at the conclusion of today's visit. Education was provided regarding diagnosis, management, and any prescribed or recommended OTC medications.Patient verbalizes understanding of and agreement with management plan.         Ismael Jones MD

## 2019-08-06 ENCOUNTER — OFFICE VISIT (OUTPATIENT)
Dept: NEUROSURGERY | Facility: CLINIC | Age: 79
End: 2019-08-06

## 2019-08-06 VITALS
SYSTOLIC BLOOD PRESSURE: 100 MMHG | WEIGHT: 148.8 LBS | DIASTOLIC BLOOD PRESSURE: 68 MMHG | TEMPERATURE: 96.7 F | BODY MASS INDEX: 25.4 KG/M2 | HEIGHT: 64 IN

## 2019-08-06 DIAGNOSIS — G56.01 CARPAL TUNNEL SYNDROME OF RIGHT WRIST: Primary | ICD-10-CM

## 2019-08-06 DIAGNOSIS — R20.0 NUMBNESS OF FINGERS: ICD-10-CM

## 2019-08-06 PROCEDURE — 99213 OFFICE O/P EST LOW 20 MIN: CPT | Performed by: NEUROLOGICAL SURGERY

## 2019-08-06 NOTE — PROGRESS NOTES
Alanna Rodriges  1940  [id number]                        CHIEF COMPLAINT: Numbness in the right hand         MEDICAL HISTORY SINCE LAST ENCOUNTER: This is a 78-year-old female who previously had ulnar decompression and median decompression of the right hand.  She has had considerable atrophy prior to surgery including the first dorsal interosseous as well as the thenar eminence.  She has noted that her index finger does not quite feel normal and slightly numb as compared to the others and she presents for reevaluation.           Past Medical History:   Diagnosis Date   • Anemia    • Anxiety disorder    • Arthritis    • Cataract    • Depression    • Dermatitis    • DVT (deep venous thrombosis) (CMS/HCC)     1975   • Elbow pain    • GERD (gastroesophageal reflux disease)    • History of blood transfusion    • History of hepatitis C virus infection    • Hypothyroidism    • Kidney stones    • Migraine     occular   • PONV (postoperative nausea and vomiting)    • Ptosis due to aging    • Seborrhea    • Seizure (CMS/HCC) 1975    during hospitalization as a side effect of Tofranil    • Shoulder pain     right   • Ulcerative colitis (CMS/HCC)    • Ulnar nerve compression, right               Past Surgical History:   Procedure Laterality Date   • BUNIONECTOMY Bilateral    • CARPAL TUNNEL RELEASE Right 1/5/2018    Procedure: CARPAL TUNNEL RELEASE RIGHT ;  Surgeon: Edmond Mccrary MD;  Location:  [facility];  Service:    • CARPAL TUNNEL RELEASE Right 2017   • COLECTOMY TOTAL     • COLON SURGERY  1975    resection   • D&C AND LAPAROSCOPY     • FACELIFT  01/2015    chemical peel   • HYSTERECTOMY  1994    bso   • ILEOSTOMY     • SKIN BIOPSY     • ULNAR NERVE DECOMPRESSION Right 12/9/2016    Procedure: RIGHT ULNAR NERVE DECOMPRESSION;  Surgeon: Edmond Mccrary MD;  Location:  [facility];  Service:    • URETHRAL DILATION      multiple              Family History   Problem Relation Age of Onset   • Alzheimer's disease Other    •  Leukemia Other    • Cancer Maternal Grandmother    • Coronary artery disease Maternal Grandfather    • Breast cancer Neg Hx    • Ovarian cancer Neg Hx               Social History     Socioeconomic History   • Marital status:      Spouse name: Not on file   • Number of children: Not on file   • Years of education: Not on file   • Highest education level: Not on file   Tobacco Use   • Smoking status: Former Smoker     Types: Cigarettes     Last attempt to quit:      Years since quittin.6   • Smokeless tobacco: Never Used   • Tobacco comment: quit smoking in her 40's    Substance and Sexual Activity   • Alcohol use: Yes     Alcohol/week: 8.4 oz     Types: 14 Glasses of wine per week     Frequency: 4 or more times a week     Drinks per session: 1 or 2   • Drug use: No   • Sexual activity: Defer              Allergies   Allergen Reactions   • Codeine Nausea Only   • Morphine And Related Hallucinations     And itching After 3 days              Current Outpatient Medications:   •  acyclovir (ZOVIRAX) 400 MG tablet, Take 400 mg by mouth 2 (Two) Times a Day. Take no more than 5 doses a day., Disp: , Rfl:   •  aspirin 81 MG EC tablet, Take 81 mg by mouth Daily. Last dose 2016, Disp: , Rfl:   •  atorvastatin (LIPITOR) 10 MG tablet, TAKE 1 TABLET BY MOUTH  EVERY DAY (Patient taking differently: TAKE 1 TABLET MON, WED, FRI), Disp: 90 tablet, Rfl: 0  •  cholecalciferol (VITAMIN D3) 1000 units tablet, Take 1,000 Units by mouth Daily., Disp: , Rfl:   •  dicyclomine (BENTYL) 10 MG capsule, TAKE 1 CAPSULE BY MOUTH  EVERY NIGHT, Disp: 90 capsule, Rfl: 0  •  gabapentin (NEURONTIN) 100 MG capsule, Take 1 cap po QID (Patient taking differently: Take 100 mg by mouth 3 (Three) Times a Day. Take 1 cap po QID), Disp: 360 capsule, Rfl: 0  •  ketoconazole (NIZORAL) 2 % cream, Apply 1 application topically Every 12 (Twelve) Hours. Shampoo weekly, Disp: , Rfl:   •  ketoconazole (NIZORAL) 2 % shampoo, Apply  topically 1  (One) Time Per Week., Disp: , Rfl:   •  levothyroxine (SYNTHROID, LEVOTHROID) 75 MCG tablet, TAKE 1 TABLET BY MOUTH  DAILY, Disp: 90 tablet, Rfl: 1  •  Omega-3 Fatty Acids (FISH OIL PO), Take 1 capsule by mouth Daily. Stopped one week prior to surgery, Disp: , Rfl:   •  PREMARIN 0.625 MG tablet, TAKE 1 TABLET BY MOUTH  EVERY DAY, Disp: 90 tablet, Rfl: 0  •  RABEprazole (ACIPHEX) 20 MG EC tablet, TAKE 1 TABLET BY MOUTH  EVERY DAY, Disp: 90 tablet, Rfl: 0         Review of Systems   Constitutional: Negative for activity change, appetite change, chills, diaphoresis, fatigue, fever and unexpected weight change.   HENT: Negative for congestion, dental problem, drooling, ear discharge, ear pain, facial swelling, hearing loss, mouth sores, nosebleeds, postnasal drip, rhinorrhea, sinus pressure, sneezing, sore throat, tinnitus, trouble swallowing and voice change.    Eyes: Negative for photophobia, pain, discharge, redness, itching and visual disturbance.   Respiratory: Negative for apnea, cough, choking, chest tightness, shortness of breath, wheezing and stridor.    Cardiovascular: Negative for chest pain, palpitations and leg swelling.   Gastrointestinal: Negative for abdominal distention, abdominal pain, anal bleeding, blood in stool, constipation, diarrhea, nausea, rectal pain and vomiting.   Endocrine: Negative for cold intolerance, heat intolerance, polydipsia, polyphagia and polyuria.   Genitourinary: Negative for decreased urine volume, difficulty urinating, dysuria, enuresis, flank pain, frequency, genital sores, hematuria and urgency.   Musculoskeletal: Negative for arthralgias, back pain, gait problem, joint swelling, myalgias, neck pain and neck stiffness.   Skin: Negative for color change, pallor, rash and wound.   Allergic/Immunologic: Negative for environmental allergies, food allergies and immunocompromised state.   Neurological: Negative for dizziness, tremors, seizures, syncope, facial asymmetry, speech  "difficulty, weakness, light-headedness, numbness and headaches.   Hematological: Negative for adenopathy. Does not bruise/bleed easily.   Psychiatric/Behavioral: Negative for agitation, behavioral problems, confusion, decreased concentration, dysphoric mood, hallucinations, self-injury, sleep disturbance and suicidal ideas. The patient is not nervous/anxious and is not hyperactive.                Vitals:    08/06/19 1026   BP: 100/68   BP Location: Left arm   Patient Position: Sitting   Cuff Size: Adult   Temp: 96.7 °F (35.9 °C)   TempSrc: Temporal   Weight: 67.5 kg (148 lb 12.8 oz)   Height: 162.6 cm (64.02\")               EXAMINATION: She has decreased sensation in the median nerve.  She has significant atrophy of the first dorsal interosseous.  Likewise, mild atrophy of the thenar eminence.  There is been no change from her previous evaluations.            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  Reassurance I think will be quite helpful.              I APPRECIATE THE OPPORTUNITY OF THIS REFERRAL. PLEASE CALL IF ANY       QUESTIONS [phone]    Scribed for Edmond Mccrary MD by Ninoska Gomes CMA. 8/6/2019  11:14 AM     I have read and concur with the information provided by the scribe.  Edmond Mccrary MD        "

## 2019-08-13 ENCOUNTER — OFFICE VISIT (OUTPATIENT)
Dept: INTERNAL MEDICINE | Facility: CLINIC | Age: 79
End: 2019-08-13

## 2019-08-13 ENCOUNTER — LAB (OUTPATIENT)
Dept: LAB | Facility: HOSPITAL | Age: 79
End: 2019-08-13

## 2019-08-13 ENCOUNTER — HOSPITAL ENCOUNTER (OUTPATIENT)
Dept: GENERAL RADIOLOGY | Facility: HOSPITAL | Age: 79
Discharge: HOME | End: 2019-08-13
Admitting: NURSE PRACTITIONER

## 2019-08-13 VITALS
HEIGHT: 64 IN | TEMPERATURE: 102.6 F | HEART RATE: 100 BPM | DIASTOLIC BLOOD PRESSURE: 84 MMHG | SYSTOLIC BLOOD PRESSURE: 162 MMHG | BODY MASS INDEX: 25.1 KG/M2 | WEIGHT: 147 LBS

## 2019-08-13 DIAGNOSIS — R53.1 WEAKNESS: ICD-10-CM

## 2019-08-13 DIAGNOSIS — R50.9 FEBRILE ILLNESS, ACUTE: ICD-10-CM

## 2019-08-13 DIAGNOSIS — R11.2 INTRACTABLE VOMITING WITH NAUSEA, UNSPECIFIED VOMITING TYPE: ICD-10-CM

## 2019-08-13 DIAGNOSIS — R53.1 WEAKNESS: Primary | ICD-10-CM

## 2019-08-13 PROCEDURE — 99213 OFFICE O/P EST LOW 20 MIN: CPT | Performed by: NURSE PRACTITIONER

## 2019-08-13 PROCEDURE — 71046 X-RAY EXAM CHEST 2 VIEWS: CPT

## 2019-08-13 RX ORDER — ONDANSETRON 4 MG/1
4 TABLET, ORALLY DISINTEGRATING ORAL EVERY 6 HOURS PRN
Status: DISCONTINUED | OUTPATIENT
Start: 2019-08-13 | End: 2019-08-19

## 2019-08-13 RX ORDER — ONDANSETRON 4 MG/1
4 TABLET, FILM COATED ORAL EVERY 8 HOURS PRN
Qty: 10 TABLET | Refills: 0 | Status: SHIPPED | OUTPATIENT
Start: 2019-08-13 | End: 2020-07-22 | Stop reason: HOSPADM

## 2019-08-13 NOTE — PATIENT INSTRUCTIONS
Zofran given in office.  Encourage slow fluid rehydration.  Labs, urine, chest x-ray this evening.  We will follow-up in a.m.

## 2019-08-13 NOTE — PROGRESS NOTES
Alanna Rodriges  1940  [id number]  Patient Care Team:  Ismael Jones MD as PCP - General  Romaine Tanner MD as Consulting Physician (Pain Medicine)  Edmond Mccrary MD as Consulting Physician (Neurosurgery)  Sherice Carrington PA-C as Physician Assistant (Neurosurgery)    Alanna Rodriges is a pleasant 78 y.o. female who presents for evaluation of Cough; Nasal Congestion; and Vomiting    This patient is accompanied by their  who contributes to the history of their care.  Chief Complaint   Patient presents with   • Cough   • Nasal Congestion   • Vomiting       HPI:   Cough yesterday, not significant, felt ok just tired.   n/v/diarrhea onset this am, fever 102 here.  No meds today.  Slightly productive cough, white mucous today.  No urinary sx, some diarrhea today.  No ill contacts.    Past Medical History:   Diagnosis Date   • Anemia    • Anxiety disorder    • Arthritis    • Cataract    • Depression    • Dermatitis    • DVT (deep venous thrombosis) (CMS/HCC)     1975   • Elbow pain    • GERD (gastroesophageal reflux disease)    • History of blood transfusion    • History of hepatitis C virus infection    • Hypothyroidism    • Kidney stones    • Migraine     occular   • PONV (postoperative nausea and vomiting)    • Ptosis due to aging    • Seborrhea    • Seizure (CMS/HCC) 1975    during hospitalization as a side effect of Tofranil    • Shoulder pain     right   • Ulcerative colitis (CMS/HCC)    • Ulnar nerve compression, right      Past Surgical History:   Procedure Laterality Date   • BUNIONECTOMY Bilateral    • CARPAL TUNNEL RELEASE Right 1/5/2018    Procedure: CARPAL TUNNEL RELEASE RIGHT ;  Surgeon: Edmond Mccrary MD;  Location: [facility];  Service:    • CARPAL TUNNEL RELEASE Right 2017   • COLECTOMY TOTAL     • COLON SURGERY  1975    resection   • D&C AND LAPAROSCOPY     • FACELIFT  01/2015    chemical peel   • HYSTERECTOMY  1994    bso   • ILEOSTOMY     • SKIN BIOPSY     • ULNAR NERVE  DECOMPRESSION Right 2016    Procedure: RIGHT ULNAR NERVE DECOMPRESSION;  Surgeon: Edmond Mccrary MD;  Location: [facility];  Service:    • URETHRAL DILATION      multiple     Family History   Problem Relation Age of Onset   • Alzheimer's disease Other    • Leukemia Other    • Cancer Maternal Grandmother    • Coronary artery disease Maternal Grandfather    • Breast cancer Neg Hx    • Ovarian cancer Neg Hx      Social History     Tobacco Use   Smoking Status Former Smoker   • Types: Cigarettes   • Last attempt to quit:    • Years since quittin.6   Smokeless Tobacco Never Used   Tobacco Comment    quit smoking in her 40's      Allergies   Allergen Reactions   • Codeine Nausea Only   • Morphine And Related Hallucinations     And itching After 3 days       Current Outpatient Medications:   •  acyclovir (ZOVIRAX) 400 MG tablet, Take 400 mg by mouth 2 (Two) Times a Day. Take no more than 5 doses a day., Disp: , Rfl:   •  aspirin 81 MG EC tablet, Take 81 mg by mouth Daily. Last dose 2016, Disp: , Rfl:   •  atorvastatin (LIPITOR) 10 MG tablet, TAKE 1 TABLET BY MOUTH  EVERY DAY (Patient taking differently: TAKE 1 TABLET MON, WED, FRI), Disp: 90 tablet, Rfl: 0  •  cholecalciferol (VITAMIN D3) 1000 units tablet, Take 1,000 Units by mouth Daily., Disp: , Rfl:   •  dicyclomine (BENTYL) 10 MG capsule, TAKE 1 CAPSULE BY MOUTH  EVERY NIGHT, Disp: 90 capsule, Rfl: 0  •  gabapentin (NEURONTIN) 100 MG capsule, Take 1 cap po QID (Patient taking differently: Take 100 mg by mouth 3 (Three) Times a Day. Take 1 cap po QID), Disp: 360 capsule, Rfl: 0  •  ketoconazole (NIZORAL) 2 % cream, Apply 1 application topically Every 12 (Twelve) Hours. Shampoo weekly, Disp: , Rfl:   •  ketoconazole (NIZORAL) 2 % shampoo, Apply  topically 1 (One) Time Per Week., Disp: , Rfl:   •  levothyroxine (SYNTHROID, LEVOTHROID) 75 MCG tablet, TAKE 1 TABLET BY MOUTH  DAILY, Disp: 90 tablet, Rfl: 1  •  Omega-3 Fatty Acids (FISH OIL PO), Take 1  "capsule by mouth Daily. Stopped one week prior to surgery, Disp: , Rfl:   •  PREMARIN 0.625 MG tablet, TAKE 1 TABLET BY MOUTH  EVERY DAY, Disp: 90 tablet, Rfl: 0  •  RABEprazole (ACIPHEX) 20 MG EC tablet, TAKE 1 TABLET BY MOUTH  EVERY DAY, Disp: 90 tablet, Rfl: 0  •  ondansetron (ZOFRAN) 4 MG tablet, Take 1 tablet by mouth Every 8 (Eight) Hours As Needed for Nausea or Vomiting., Disp: 10 tablet, Rfl: 0    Current Facility-Administered Medications:   •  ondansetron ODT (ZOFRAN-ODT) disintegrating tablet 4 mg, 4 mg, Oral, Q6H PRN, America Diamond, DIEGO    Review of Systems   Constitutional: Positive for chills, fatigue and fever.   HENT: Positive for congestion. Negative for ear pain and sinus pressure.    Respiratory: Positive for cough, chest tightness and shortness of breath. Negative for wheezing.    Cardiovascular: Negative for chest pain and palpitations.   Gastrointestinal: Positive for abdominal pain and diarrhea. Negative for abdominal distention, blood in stool and constipation.   Skin: Negative for color change.   Allergic/Immunologic: Negative for environmental allergies.   Neurological: Positive for dizziness. Negative for speech difficulty and headache.   Psychiatric/Behavioral: Positive for decreased concentration. The patient is nervous/anxious.      /84 (BP Location: Left arm, Patient Position: Sitting, Cuff Size: Adult)   Pulse 100   Temp (!) 102.6 °F (39.2 °C) (Temporal)   Ht 162.6 cm (64.02\")   Wt 66.7 kg (147 lb)   BMI 25.22 kg/m²     Physical Exam   Constitutional: She appears well-developed and well-nourished. She appears ill.   HENT:   Head: Normocephalic and atraumatic.   Right Ear: External ear normal.   Left Ear: External ear normal.   Mouth/Throat: Oropharynx is clear and moist.   Eyes: Conjunctivae and EOM are normal.   Neck: Normal range of motion. Neck supple.   Cardiovascular: Regular rhythm, normal heart sounds and normal pulses. Tachycardia present.   Pulmonary/Chest: " Effort normal and breath sounds normal.   Abdominal: Soft. Bowel sounds are normal. There is generalized tenderness. There is no rigidity and no guarding.   Musculoskeletal: Normal range of motion.   Lymphadenopathy:     She has cervical adenopathy.   Neurological: She is alert.   Skin: Skin is warm and dry.   Psychiatric: She has a normal mood and affect. Her behavior is normal. Thought content normal.       Assessment/Plan:  Alanna was seen today for cough, nasal congestion and vomiting.    Diagnoses and all orders for this visit:    Weakness  -     CBC & Differential; Future  -     XR Chest PA & Lateral; Future  -     Urinalysis With Culture If Indicated -; Future    Febrile illness, acute    Intractable vomiting with nausea, unspecified vomiting type  -     ondansetron ODT (ZOFRAN-ODT) disintegrating tablet 4 mg    Other orders  -     ondansetron (ZOFRAN) 4 MG tablet; Take 1 tablet by mouth Every 8 (Eight) Hours As Needed for Nausea or Vomiting.       Patient Instructions   Zofran given in office.  Encourage slow fluid rehydration.  Labs, urine, chest x-ray this evening.  We will follow-up in a.m.    Plan of care reviewed with patient at the conclusion of today's visit. Education was provided regarding diagnosis, management and any prescribed or recommended OTC medications.  Patient verbalizes understanding of and agreement with management plan.    No Follow-up on file.    *Note that portions of this note were completed with a voice recognition program.  Efforts were made to edit the dictation but occasionally words are transcribed.    DIEGO Hwang

## 2019-08-14 ENCOUNTER — OFFICE VISIT (OUTPATIENT)
Dept: INTERNAL MEDICINE | Facility: CLINIC | Age: 79
End: 2019-08-14

## 2019-08-14 ENCOUNTER — LAB (OUTPATIENT)
Dept: LAB | Facility: HOSPITAL | Age: 79
End: 2019-08-14

## 2019-08-14 VITALS
TEMPERATURE: 97.7 F | DIASTOLIC BLOOD PRESSURE: 58 MMHG | HEIGHT: 64 IN | WEIGHT: 145 LBS | BODY MASS INDEX: 24.75 KG/M2 | SYSTOLIC BLOOD PRESSURE: 112 MMHG | HEART RATE: 96 BPM

## 2019-08-14 DIAGNOSIS — E55.9 VITAMIN D DEFICIENCY: ICD-10-CM

## 2019-08-14 DIAGNOSIS — E78.2 MIXED HYPERLIPIDEMIA: ICD-10-CM

## 2019-08-14 DIAGNOSIS — R50.9 FEBRILE ILLNESS, ACUTE: Primary | ICD-10-CM

## 2019-08-14 DIAGNOSIS — G56.01 CARPAL TUNNEL SYNDROME OF RIGHT WRIST: ICD-10-CM

## 2019-08-14 DIAGNOSIS — Z79.899 HIGH RISK MEDICATION USE: ICD-10-CM

## 2019-08-14 DIAGNOSIS — E03.9 HYPOTHYROIDISM, UNSPECIFIED TYPE: ICD-10-CM

## 2019-08-14 DIAGNOSIS — R05.9 COUGH: ICD-10-CM

## 2019-08-14 DIAGNOSIS — R50.9 FEBRILE ILLNESS, ACUTE: ICD-10-CM

## 2019-08-14 DIAGNOSIS — R53.1 WEAKNESS: ICD-10-CM

## 2019-08-14 DIAGNOSIS — R11.2 INTRACTABLE VOMITING WITH NAUSEA, UNSPECIFIED VOMITING TYPE: ICD-10-CM

## 2019-08-14 LAB
BASOPHILS # BLD AUTO: 0.05 10*3/MM3 (ref 0–0.2)
BASOPHILS NFR BLD AUTO: 0.5 % (ref 0–1.5)
BILIRUB BLD-MCNC: ABNORMAL MG/DL
CLARITY, POC: ABNORMAL
COLOR UR: ABNORMAL
DEPRECATED RDW RBC AUTO: 48.8 FL (ref 37–54)
EOSINOPHIL # BLD AUTO: 0.04 10*3/MM3 (ref 0–0.4)
EOSINOPHIL NFR BLD AUTO: 0.4 % (ref 0.3–6.2)
ERYTHROCYTE [DISTWIDTH] IN BLOOD BY AUTOMATED COUNT: 12.7 % (ref 12.3–15.4)
EXPIRATION DATE: NORMAL
FLUAV RNA RESP QL NAA+PROBE: NEGATIVE
FLUBV RNA RESP QL NAA+PROBE: NEGATIVE
GLUCOSE UR STRIP-MCNC: NEGATIVE MG/DL
HCT VFR BLD AUTO: 43.9 % (ref 34–46.6)
HGB BLD-MCNC: 14.2 G/DL (ref 12–15.9)
IMM GRANULOCYTES # BLD AUTO: 0.03 10*3/MM3 (ref 0–0.05)
IMM GRANULOCYTES NFR BLD AUTO: 0.3 % (ref 0–0.5)
INTERNAL CONTROL: NORMAL
KETONES UR QL: ABNORMAL
LEUKOCYTE EST, POC: ABNORMAL
LYMPHOCYTES # BLD AUTO: 1.04 10*3/MM3 (ref 0.7–3.1)
LYMPHOCYTES NFR BLD AUTO: 10.6 % (ref 19.6–45.3)
Lab: NORMAL
MCH RBC QN AUTO: 33.7 PG (ref 26.6–33)
MCHC RBC AUTO-ENTMCNC: 32.3 G/DL (ref 31.5–35.7)
MCV RBC AUTO: 104.3 FL (ref 79–97)
MONOCYTES # BLD AUTO: 0.82 10*3/MM3 (ref 0.1–0.9)
MONOCYTES NFR BLD AUTO: 8.4 % (ref 5–12)
NEUTROPHILS # BLD AUTO: 7.84 10*3/MM3 (ref 1.7–7)
NEUTROPHILS NFR BLD AUTO: 79.8 % (ref 42.7–76)
NITRITE UR-MCNC: NEGATIVE MG/ML
NRBC BLD AUTO-RTO: 0.1 /100 WBC (ref 0–0.2)
PH UR: 5.5 [PH] (ref 5–8)
PLATELET # BLD AUTO: 233 10*3/MM3 (ref 140–450)
PMV BLD AUTO: 11 FL (ref 6–12)
PROT UR STRIP-MCNC: ABNORMAL MG/DL
RBC # BLD AUTO: 4.21 10*6/MM3 (ref 3.77–5.28)
RBC # UR STRIP: NEGATIVE /UL
SP GR UR: 1.02 (ref 1–1.03)
UROBILINOGEN UR QL: NORMAL
WBC NRBC COR # BLD: 9.82 10*3/MM3 (ref 3.4–10.8)

## 2019-08-14 PROCEDURE — 85025 COMPLETE CBC W/AUTO DIFF WBC: CPT

## 2019-08-14 PROCEDURE — 81003 URINALYSIS AUTO W/O SCOPE: CPT | Performed by: NURSE PRACTITIONER

## 2019-08-14 PROCEDURE — 87086 URINE CULTURE/COLONY COUNT: CPT

## 2019-08-14 PROCEDURE — 99213 OFFICE O/P EST LOW 20 MIN: CPT | Performed by: NURSE PRACTITIONER

## 2019-08-14 PROCEDURE — 36415 COLL VENOUS BLD VENIPUNCTURE: CPT

## 2019-08-14 PROCEDURE — 87502 INFLUENZA DNA AMP PROBE: CPT | Performed by: NURSE PRACTITIONER

## 2019-08-14 NOTE — PATIENT INSTRUCTIONS
Flu negative in office.  We will send a culture on the urine sample.  Chest x-ray is good for pneumonia.  Continue to treat the cough with over-the-counter medications as needed.  Good nutrition and hydration will be very important for healing.  If any new or worse symptoms as we discussed return to office.  We will call you when we get your blood work results back.

## 2019-08-14 NOTE — PROGRESS NOTES
Alanna Rodriegs  1940  [id number]  Patient Care Team:  Ismael Jones MD as PCP - General  Romaine Tanner MD as Consulting Physician (Pain Medicine)  Edmond Mccrary MD as Consulting Physician (Neurosurgery)  Sherice Carrington PA-C as Physician Assistant (Neurosurgery)    Alanna Rodriges is a pleasant 78 y.o. female who presents for evaluation of Fever    This patient is accompanied by their  who contributes to the history of their care.  Chief Complaint   Patient presents with   • Fever       HPI:   Acute viral illness: Preliminary chest x-ray done last night is negative for pneumonia.  She had blood and urine collected in the lab this morning those results are pending.  Nausea has resolved.  No fever in office today.  Last night 2 tylenol at home, has not needed any zofran, we gave her one in office yest evening.  No abdominal pain today.  Still having a little diarrhea (ileostomy)    Right neck lymph node swelling, sore throat with swallowing, still has cough, minimally productive, less than yest  Past Medical History:   Diagnosis Date   • Anemia    • Anxiety disorder    • Arthritis    • Cataract    • Depression    • Dermatitis    • DVT (deep venous thrombosis) (CMS/HCC)     1975   • Elbow pain    • GERD (gastroesophageal reflux disease)    • History of blood transfusion    • History of hepatitis C virus infection    • Hypothyroidism    • Kidney stones    • Migraine     occular   • PONV (postoperative nausea and vomiting)    • Ptosis due to aging    • Seborrhea    • Seizure (CMS/HCC) 1975    during hospitalization as a side effect of Tofranil    • Shoulder pain     right   • Ulcerative colitis (CMS/HCC)    • Ulnar nerve compression, right      Past Surgical History:   Procedure Laterality Date   • BUNIONECTOMY Bilateral    • CARPAL TUNNEL RELEASE Right 1/5/2018    Procedure: CARPAL TUNNEL RELEASE RIGHT ;  Surgeon: Edmond Mccrary MD;  Location: [facility];  Service:    • CARPAL TUNNEL  RELEASE Right    • COLECTOMY TOTAL     • COLON SURGERY      resection   • D&C AND LAPAROSCOPY     • FACELIFT  2015    chemical peel   • HYSTERECTOMY      bso   • ILEOSTOMY     • SKIN BIOPSY     • ULNAR NERVE DECOMPRESSION Right 2016    Procedure: RIGHT ULNAR NERVE DECOMPRESSION;  Surgeon: Edmond Mccrary MD;  Location: [facility];  Service:    • URETHRAL DILATION      multiple     Family History   Problem Relation Age of Onset   • Alzheimer's disease Other    • Leukemia Other    • Cancer Maternal Grandmother    • Coronary artery disease Maternal Grandfather    • Breast cancer Neg Hx    • Ovarian cancer Neg Hx      Social History     Tobacco Use   Smoking Status Former Smoker   • Types: Cigarettes   • Last attempt to quit:    • Years since quittin.6   Smokeless Tobacco Never Used   Tobacco Comment    quit smoking in her 40's      Allergies   Allergen Reactions   • Codeine Nausea Only   • Morphine And Related Hallucinations     And itching After 3 days       Current Outpatient Medications:   •  acyclovir (ZOVIRAX) 400 MG tablet, Take 400 mg by mouth 2 (Two) Times a Day. Take no more than 5 doses a day., Disp: , Rfl:   •  aspirin 81 MG EC tablet, Take 81 mg by mouth Daily. Last dose 2016, Disp: , Rfl:   •  atorvastatin (LIPITOR) 10 MG tablet, TAKE 1 TABLET BY MOUTH  EVERY DAY (Patient taking differently: TAKE 1 TABLET MON, WED, FRI), Disp: 90 tablet, Rfl: 0  •  cholecalciferol (VITAMIN D3) 1000 units tablet, Take 1,000 Units by mouth Daily., Disp: , Rfl:   •  dicyclomine (BENTYL) 10 MG capsule, TAKE 1 CAPSULE BY MOUTH  EVERY NIGHT, Disp: 90 capsule, Rfl: 0  •  gabapentin (NEURONTIN) 100 MG capsule, Take 1 cap po QID (Patient taking differently: Take 100 mg by mouth 3 (Three) Times a Day. Take 1 cap po QID), Disp: 360 capsule, Rfl: 0  •  ketoconazole (NIZORAL) 2 % cream, Apply 1 application topically Every 12 (Twelve) Hours. Shampoo weekly, Disp: , Rfl:   •  ketoconazole (NIZORAL) 2 %  "shampoo, Apply  topically 1 (One) Time Per Week., Disp: , Rfl:   •  levothyroxine (SYNTHROID, LEVOTHROID) 75 MCG tablet, TAKE 1 TABLET BY MOUTH  DAILY, Disp: 90 tablet, Rfl: 1  •  Omega-3 Fatty Acids (FISH OIL PO), Take 1 capsule by mouth Daily. Stopped one week prior to surgery, Disp: , Rfl:   •  ondansetron (ZOFRAN) 4 MG tablet, Take 1 tablet by mouth Every 8 (Eight) Hours As Needed for Nausea or Vomiting., Disp: 10 tablet, Rfl: 0  •  PREMARIN 0.625 MG tablet, TAKE 1 TABLET BY MOUTH  EVERY DAY, Disp: 90 tablet, Rfl: 0  •  RABEprazole (ACIPHEX) 20 MG EC tablet, TAKE 1 TABLET BY MOUTH  EVERY DAY, Disp: 90 tablet, Rfl: 0    Current Facility-Administered Medications:   •  ondansetron ODT (ZOFRAN-ODT) disintegrating tablet 4 mg, 4 mg, Oral, Q6H PRN, America Diamond, DIEGO    Review of Systems   Constitutional: Positive for fatigue. Negative for chills and fever.   HENT: Positive for congestion. Negative for ear pain and sinus pressure.    Respiratory: Positive for cough, chest tightness and shortness of breath. Negative for wheezing.    Cardiovascular: Negative for chest pain and palpitations.   Gastrointestinal: Negative for abdominal pain, blood in stool and constipation.   Skin: Negative for color change.   Allergic/Immunologic: Negative for environmental allergies.   Neurological: Positive for dizziness. Negative for speech difficulty and headache.   Psychiatric/Behavioral: Positive for decreased concentration. The patient is nervous/anxious.      /58 (BP Location: Left arm, Patient Position: Sitting, Cuff Size: Adult)   Pulse 96   Temp 97.7 °F (36.5 °C) (Temporal)   Ht 162.6 cm (64.02\")   Wt 65.8 kg (145 lb)   BMI 24.88 kg/m²     Physical Exam   Constitutional: She appears well-developed and well-nourished.   HENT:   Head: Normocephalic and atraumatic.   Right Ear: External ear normal.   Left Ear: External ear normal.   Mouth/Throat: Oropharynx is clear and moist.   Eyes: Conjunctivae and EOM are " normal.   Neck: Normal range of motion. Neck supple.   Cardiovascular: Normal rate, regular rhythm and normal heart sounds.   Pulmonary/Chest: Effort normal and breath sounds normal.   Abdominal: Soft. Bowel sounds are normal.   Musculoskeletal: Normal range of motion.   Lymphadenopathy:     She has no cervical adenopathy.   Neurological: She is alert.   Skin: Skin is warm and dry.   Psychiatric: She has a normal mood and affect. Her behavior is normal. Thought content normal.       Results Review:  I reviewed the patient's new clinical results.    Assessment/Plan:  Alanna was seen today for fever.    Diagnoses and all orders for this visit:    Febrile illness, acute  -     POC Urinalysis Dipstick, Automated    Cough  -     POCT Flu A&B, Molecular       Patient Instructions   Flu negative in office.  We will send a culture on the urine sample.  Chest x-ray is good for pneumonia.  Continue to treat the cough with over-the-counter medications as needed.  Good nutrition and hydration will be very important for healing.  If any new or worse symptoms as we discussed return to office.  We will call you when we get your blood work results back.    Plan of care reviewed with patient at the conclusion of today's visit. Education was provided regarding diagnosis, management and any prescribed or recommended OTC medications.  Patient verbalizes understanding of and agreement with management plan.    Return if symptoms worsen or fail to improve.    *Note that portions of this note were completed with a voice recognition program.  Efforts were made to edit the dictation but occasionally words are transcribed.    DIEGO Hwang

## 2019-08-15 LAB — BACTERIA SPEC AEROBE CULT: NORMAL

## 2019-08-19 ENCOUNTER — TELEPHONE (OUTPATIENT)
Dept: INTERNAL MEDICINE | Facility: CLINIC | Age: 79
End: 2019-08-19

## 2019-08-19 ENCOUNTER — OFFICE VISIT (OUTPATIENT)
Dept: INTERNAL MEDICINE | Facility: CLINIC | Age: 79
End: 2019-08-19

## 2019-08-19 ENCOUNTER — HOSPITAL ENCOUNTER (OUTPATIENT)
Dept: GENERAL RADIOLOGY | Facility: HOSPITAL | Age: 79
Discharge: HOME | End: 2019-08-19
Admitting: NURSE PRACTITIONER

## 2019-08-19 VITALS
BODY MASS INDEX: 24.24 KG/M2 | WEIGHT: 142 LBS | TEMPERATURE: 98.4 F | HEIGHT: 64 IN | HEART RATE: 100 BPM | SYSTOLIC BLOOD PRESSURE: 116 MMHG | DIASTOLIC BLOOD PRESSURE: 68 MMHG

## 2019-08-19 DIAGNOSIS — R05.9 COUGH: Primary | ICD-10-CM

## 2019-08-19 DIAGNOSIS — R05.9 COUGH: ICD-10-CM

## 2019-08-19 PROCEDURE — 71046 X-RAY EXAM CHEST 2 VIEWS: CPT

## 2019-08-19 PROCEDURE — 96372 THER/PROPH/DIAG INJ SC/IM: CPT | Performed by: NURSE PRACTITIONER

## 2019-08-19 PROCEDURE — 99213 OFFICE O/P EST LOW 20 MIN: CPT | Performed by: NURSE PRACTITIONER

## 2019-08-19 RX ORDER — AZITHROMYCIN 250 MG/1
TABLET, FILM COATED ORAL
Qty: 6 TABLET | Refills: 0 | Status: SHIPPED | OUTPATIENT
Start: 2019-08-19 | End: 2019-08-29

## 2019-08-19 RX ORDER — ALBUTEROL SULFATE 90 UG/1
2 AEROSOL, METERED RESPIRATORY (INHALATION) EVERY 4 HOURS PRN
Qty: 1 INHALER | Refills: 0 | Status: SHIPPED | OUTPATIENT
Start: 2019-08-19 | End: 2020-07-22 | Stop reason: HOSPADM

## 2019-08-19 RX ORDER — CEFTRIAXONE 1 G/1
1 INJECTION, POWDER, FOR SOLUTION INTRAMUSCULAR; INTRAVENOUS EVERY 24 HOURS
Status: COMPLETED | OUTPATIENT
Start: 2019-08-19 | End: 2019-08-19

## 2019-08-19 RX ADMIN — CEFTRIAXONE 1 G: 1 INJECTION, POWDER, FOR SOLUTION INTRAMUSCULAR; INTRAVENOUS at 14:31

## 2019-08-19 NOTE — TELEPHONE ENCOUNTER
JUVENTINO FROM [facility] CALLED STATING HE NEEDS CLARIFICATION ON PTS TUSSIONEX  HOW OFTEN CAN PT REPEAT THIS DOSE

## 2019-08-19 NOTE — PATIENT INSTRUCTIONS
Use albuterol 2 puffs every 4-6 hours as needed for wheezing, shortness of breath, chest tightness or excessive coughing.  Rocephin 1 g given today  Start zpack antibiotic tonight  CXR today

## 2019-08-19 NOTE — PROGRESS NOTES
Alanna Rodriges  1940  [id number]  Patient Care Team:  Ismael Jones MD as PCP - General  Romaine Tanner MD as Consulting Physician (Pain Medicine)  Edmond Mccrary MD as Consulting Physician (Neurosurgery)  Sherice Carrington PA-C as Physician Assistant (Neurosurgery)    Alanna Rodriges is a pleasant 78 y.o. female who presents for evaluation of Illness    This patient is accompanied by their  who contributes to the history of their care.  Chief Complaint   Patient presents with   • Illness       HPI:   1 week history of febrile illness, seen here last week at onset, CXR showed nothing acute, labs unremarkable at that time with lungs CTA bilat  Continues with productive cough, thick mucous, wheezing worse, continued intermittant fever 101-102 at times, using tylenol, not sleeping well due to coughing, poor appetite  No abd pain but continued diarrhea.  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    • Seborrhea    • Seizure (CMS/HCC) 1975    during hospitalization as a side effect of Tofranil    • Shoulder pain     right   • Ulcerative colitis (CMS/HCC)    • Ulnar nerve compression, right      Past Surgical History:   Procedure Laterality Date   • BUNIONECTOMY Bilateral    • CARPAL TUNNEL RELEASE Right 1/5/2018    Procedure: CARPAL TUNNEL RELEASE RIGHT ;  Surgeon: Edmond Mccrary MD;  Location: [facility];  Service:    • CARPAL TUNNEL RELEASE Right 2017   • COLECTOMY TOTAL     • COLON SURGERY  1975    resection   • D&C AND  LAPAROSCOPY     • FACELIFT  2015    chemical peel   • HYSTERECTOMY      bso   • ILEOSTOMY     • SKIN BIOPSY     • ULNAR NERVE DECOMPRESSION Right 2016    Procedure: RIGHT ULNAR NERVE DECOMPRESSION;  Surgeon: Edmond Mccrary MD;  Location: [facility];  Service:    • URETHRAL DILATION      multiple     Family History   Problem Relation Age of Onset   • Alzheimer's disease Other    • Leukemia Other    • Cancer Maternal Grandmother    • Coronary artery disease Maternal Grandfather    • Breast cancer Neg Hx    • Ovarian cancer Neg Hx      Social History     Tobacco Use   Smoking Status Former Smoker   • Types: Cigarettes   • Last attempt to quit:    • Years since quittin.6   Smokeless Tobacco Never Used   Tobacco Comment    quit smoking in her 40's      Allergies   Allergen Reactions   • Codeine Nausea Only   • Morphine And Related Hallucinations     And itching After 3 days       Current Outpatient Medications:   •  acyclovir (ZOVIRAX) 400 MG tablet, Take 400 mg by mouth 2 (Two) Times a Day. Take no more than 5 doses a day., Disp: , Rfl:   •  aspirin 81 MG EC tablet, Take 81 mg by mouth Daily. Last dose 2016, Disp: , Rfl:   •  atorvastatin (LIPITOR) 10 MG tablet, TAKE 1 TABLET BY MOUTH  EVERY DAY (Patient taking differently: TAKE 1 TABLET MON, WED, FRI), Disp: 90 tablet, Rfl: 0  •  cholecalciferol (VITAMIN D3) 1000 units tablet, Take 1,000 Units by mouth Daily., Disp: , Rfl:   •  dicyclomine (BENTYL) 10 MG capsule, TAKE 1 CAPSULE BY MOUTH  EVERY NIGHT, Disp: 90 capsule, Rfl: 0  •  gabapentin (NEURONTIN) 100 MG capsule, Take 1 cap po QID (Patient taking differently: Take 100 mg by mouth 3 (Three) Times a Day. Take 1 cap po QID), Disp: 360 capsule, Rfl: 0  •  ketoconazole (NIZORAL) 2 % cream, Apply 1 application topically Every 12 (Twelve) Hours. Shampoo weekly, Disp: , Rfl:   •  ketoconazole (NIZORAL) 2 % shampoo, Apply  topically 1 (One) Time Per Week., Disp: , Rfl:   •  levothyroxine  "(SYNTHROID, LEVOTHROID) 75 MCG tablet, TAKE 1 TABLET BY MOUTH  DAILY, Disp: 90 tablet, Rfl: 1  •  Omega-3 Fatty Acids (FISH OIL PO), Take 1 capsule by mouth Daily. Stopped one week prior to surgery, Disp: , Rfl:   •  ondansetron (ZOFRAN) 4 MG tablet, Take 1 tablet by mouth Every 8 (Eight) Hours As Needed for Nausea or Vomiting., Disp: 10 tablet, Rfl: 0  •  PREMARIN 0.625 MG tablet, TAKE 1 TABLET BY MOUTH  EVERY DAY, Disp: 90 tablet, Rfl: 0  •  RABEprazole (ACIPHEX) 20 MG EC tablet, TAKE 1 TABLET BY MOUTH  EVERY DAY, Disp: 90 tablet, Rfl: 0  •  albuterol sulfate  (90 Base) MCG/ACT inhaler, Inhale 2 puffs Every 4 (Four) Hours As Needed for Wheezing or Shortness of Air., Disp: 1 inhaler, Rfl: 0  •  azithromycin (ZITHROMAX Z-PURA) 250 MG tablet, Take 2 tablets the first day, then 1 tablet daily for 4 days., Disp: 6 tablet, Rfl: 0  •  HYDROcod Polst-CPM Polst ER (TUSSIONEX PENNKINETIC ER) 10-8 MG/5ML ER suspension, Use 5 ml for severe coughing, no driving on this, Disp: 115 mL, Rfl: 0  No current facility-administered medications for this visit.     Review of Systems   Constitutional: Positive for chills, fatigue and fever.   HENT: Positive for congestion. Negative for ear pain and sinus pressure.    Respiratory: Positive for cough, chest tightness, shortness of breath and wheezing.    Cardiovascular: Negative for chest pain and palpitations.   Gastrointestinal: Positive for diarrhea. Negative for abdominal pain, blood in stool and constipation.   Skin: Negative for color change.   Allergic/Immunologic: Negative for environmental allergies.   Neurological: Positive for headache. Negative for dizziness and speech difficulty.   Psychiatric/Behavioral: Positive for decreased concentration. The patient is nervous/anxious.      /68 (BP Location: Left arm, Patient Position: Sitting, Cuff Size: Adult)   Pulse 100   Temp 98.4 °F (36.9 °C) (Temporal)   Ht 162.6 cm (64.02\")   Wt 64.4 kg (142 lb)   BMI 24.36 kg/m² "     Physical Exam   Constitutional: She appears well-developed. She has a sickly appearance.   Pulmonary/Chest: She has decreased breath sounds. She has wheezes in the right upper field and the left upper field. She has rhonchi in the right upper field, the right middle field, the left upper field and the left middle field.       Results Review:  I reviewed the patient's new clinical results.    Assessment/Plan:  Alanna was seen today for illness.    Diagnoses and all orders for this visit:    Cough  -     cefTRIAXone (ROCEPHIN) injection 1 g  -     azithromycin (ZITHROMAX Z-PURA) 250 MG tablet; Take 2 tablets the first day, then 1 tablet daily for 4 days.  -     albuterol sulfate  (90 Base) MCG/ACT inhaler; Inhale 2 puffs Every 4 (Four) Hours As Needed for Wheezing or Shortness of Air.  -     XR Chest PA & Lateral; Future    Other orders  -     HYDROcod Polst-CPM Polst ER (TUSSIONEX PENNKINETIC ER) 10-8 MG/5ML ER suspension; Use 5 ml for severe coughing, no driving on this       Patient Instructions   Use albuterol 2 puffs every 4-6 hours as needed for wheezing, shortness of breath, chest tightness or excessive coughing.  Rocephin 1 g given today  Start zpack antibiotic tonight  CXR today    Plan of care reviewed with patient at the conclusion of today's visit. Education was provided regarding diagnosis, management and any prescribed or recommended OTC medications.  Patient verbalizes understanding of and agreement with management plan.    Return in about 4 days (around 8/23/2019).    *Note that portions of this note were completed with a voice recognition program.  Efforts were made to edit the dictation but occasionally words are transcribed.    America Diamond, DIEGO

## 2019-08-20 ENCOUNTER — TELEPHONE (OUTPATIENT)
Dept: INTERNAL MEDICINE | Facility: CLINIC | Age: 79
End: 2019-08-20

## 2019-08-20 RX ORDER — ATORVASTATIN CALCIUM 10 MG/1
TABLET, FILM COATED ORAL
Qty: 90 TABLET | Refills: 0 | Status: SHIPPED | OUTPATIENT
Start: 2019-08-20 | End: 2019-11-26 | Stop reason: SDUPTHER

## 2019-08-20 NOTE — TELEPHONE ENCOUNTER
PT CALLED WANTING TO KNOW IF SHE IS STILL CONTAGIOUS AND ALSO TO SEE IF WE HAVE THE RESULTS FROM HER CHEST X-RAY   I INFORMED HER THAT WE DO NOT HAVE THE FINAL RESULTS AND AS SOON AS WE DO AND YOU REVIEW THEM SOMEONE WILL GIVE HER A CALL

## 2019-08-20 NOTE — TELEPHONE ENCOUNTER
He can let her know that I have looked at the preliminary result.  Again, no distinct pneumonia, but there is a suggestion of viral pneumonia left lobe.  This is consistent with worse symptoms and lung sounds.  Raimundo have her on the appropriate medications I would like for her to follow-up here on Friday with someone please.

## 2019-08-23 ENCOUNTER — OFFICE VISIT (OUTPATIENT)
Dept: INTERNAL MEDICINE | Facility: CLINIC | Age: 79
End: 2019-08-23

## 2019-08-23 VITALS
DIASTOLIC BLOOD PRESSURE: 72 MMHG | SYSTOLIC BLOOD PRESSURE: 110 MMHG | HEIGHT: 64 IN | WEIGHT: 139 LBS | BODY MASS INDEX: 23.73 KG/M2 | HEART RATE: 88 BPM | TEMPERATURE: 97.4 F

## 2019-08-23 DIAGNOSIS — J40 BRONCHITIS: Primary | ICD-10-CM

## 2019-08-23 PROCEDURE — 99213 OFFICE O/P EST LOW 20 MIN: CPT | Performed by: INTERNAL MEDICINE

## 2019-08-23 RX ORDER — DOXYCYCLINE HYCLATE 100 MG/1
100 CAPSULE ORAL 2 TIMES DAILY
Qty: 14 CAPSULE | Refills: 0 | Status: SHIPPED | OUTPATIENT
Start: 2019-08-23 | End: 2020-07-22

## 2019-08-23 NOTE — PROGRESS NOTES
[facility]     Alanna Rodriges  1940   [id number]      Patient Care Team:  Ismael Jones MD as PCP - General  Romaine Tanner MD as Consulting Physician (Pain Medicine)  Edmond Mccrary MD as Consulting Physician (Neurosurgery)  Sherice Carrington PA-C as Physician Assistant (Neurosurgery)    Chief Complaint::   Chief Complaint   Patient presents with   • Cough   • Fatigue   • Dizziness   • Nausea        HPI  Ms. Rodriges is here for follow-up of her upper respiratory infection.  She was initially seen with cough, congestion, fever and vomiting on the 13th.  Laboratory studies including chest x-ray CBC and urinalysis were done which were normal.  She was given Zofran.  Her temperature at times was as high as 102.  She was seen again the next day and a flu screen was negative.  She presented again on the 19th with persistent productive cough wheezing and fevers 101-102 at times and night sweats.  Repeat chest x-ray showed chronic changes.  She was given ceftriaxone injection and azithromycin as well as Tussionex.  Today she is better but she still does not feel well.  Her cough persists although the fevers have abated.  She still has night sweats, little appetite and profound fatigue.    Chronic Conditions:      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 or radiculopathy   • Neuroforaminal stenosis of spine   • Leg length discrepancy   • Ulnar neuropathy at elbow of right upper extremity   • CTS (carpal tunnel syndrome)   • Carpal tunnel syndrome of right wrist   • Displacement of intervertebral disc of lumbosacral region   • Anxiety state   • Acute non intractable tension-type headache   • Asthma   • Bilateral hearing loss   • BMI 26.0-26.9,adult   • Depression   • Dysuria    • GERD (gastroesophageal reflux disease)   • Hypothyroidism   • Impacted cerumen, bilateral   • Irritable bowel syndrome   • Medicare annual wellness visit, subsequent   • Mixed hyperlipidemia   • Osteoarthrosis   • Disc disorder of cervical region   • Vitamin D deficiency   • Numbness of fingers        Past Medical History:   Diagnosis Date   • Anemia    • Anxiety disorder    • Arthritis    • Cataract    • Depression    • Dermatitis    • DVT (deep venous thrombosis) (CMS/[facility])     1975   • Elbow pain    • GERD (gastroesophageal reflux disease)    • History of blood transfusion    • History of hepatitis C virus infection    • Hypothyroidism    • Kidney stones    • Migraine     occular   • PONV (postoperative nausea and vomiting)    • Ptosis due to aging    • Seborrhea    • Seizure (CMS/HCC) 1975    during hospitalization as a side effect of Tofranil    • Shoulder pain     right   • Ulcerative colitis (CMS/[facility])    • Ulnar nerve compression, right        Past Surgical History:   Procedure Laterality Date   • BUNIONECTOMY Bilateral    • CARPAL TUNNEL RELEASE Right 1/5/2018    Procedure: CARPAL TUNNEL RELEASE RIGHT ;  Surgeon: Edmond Mccrary MD;  Location:  [facility];  Service:    • CARPAL TUNNEL RELEASE Right 2017   • COLECTOMY TOTAL     • COLON SURGERY  1975    resection   • D&C AND LAPAROSCOPY     • FACELIFT  01/2015    chemical peel   • HYSTERECTOMY  1994    bso   • ILEOSTOMY     • SKIN BIOPSY     • ULNAR NERVE DECOMPRESSION Right 12/9/2016    Procedure: RIGHT ULNAR NERVE DECOMPRESSION;  Surgeon: Edmond Mccrary MD;  Location:  [facility];  Service:    • URETHRAL DILATION      multiple       Family History   Problem Relation Age of Onset   • Alzheimer's disease Other    • Leukemia Other    • Cancer Maternal Grandmother    • Coronary artery disease Maternal Grandfather    • Breast cancer Neg Hx    • Ovarian cancer Neg Hx        Social History     Socioeconomic History   • Marital status:      Spouse name:  Not on file   • Number of children: Not on file   • Years of education: Not on file   • Highest education level: Not on file   Tobacco Use   • Smoking status: Former Smoker     Types: Cigarettes     Last attempt to quit:      Years since quittin.6   • Smokeless tobacco: Never Used   • Tobacco comment: quit smoking in her 40's    Substance and Sexual Activity   • Alcohol use: Yes     Alcohol/week: 8.4 oz     Types: 14 Glasses of wine per week     Frequency: 4 or more times a week     Drinks per session: 1 or 2   • Drug use: No   • Sexual activity: Defer       Allergies   Allergen Reactions   • Codeine Nausea Only   • Morphine And Related Hallucinations     And itching After 3 days         Current Outpatient Medications:   •  acyclovir (ZOVIRAX) 400 MG tablet, Take 400 mg by mouth 2 (Two) Times a Day. Take no more than 5 doses a day., Disp: , Rfl:   •  albuterol sulfate  (90 Base) MCG/ACT inhaler, Inhale 2 puffs Every 4 (Four) Hours As Needed for Wheezing or Shortness of Air., Disp: 1 inhaler, Rfl: 0  •  aspirin 81 MG EC tablet, Take 81 mg by mouth Daily. Last dose 2016, Disp: , Rfl:   •  atorvastatin (LIPITOR) 10 MG tablet, TAKE 1 TABLET BY MOUTH  EVERY DAY, Disp: 90 tablet, Rfl: 0  •  azithromycin (ZITHROMAX Z-PURA) 250 MG tablet, Take 2 tablets the first day, then 1 tablet daily for 4 days., Disp: 6 tablet, Rfl: 0  •  cholecalciferol (VITAMIN D3) 1000 units tablet, Take 1,000 Units by mouth Daily., Disp: , Rfl:   •  dicyclomine (BENTYL) 10 MG capsule, TAKE 1 CAPSULE BY MOUTH  EVERY NIGHT, Disp: 90 capsule, Rfl: 0  •  gabapentin (NEURONTIN) 100 MG capsule, Take 1 cap po QID (Patient taking differently: Take 100 mg by mouth 3 (Three) Times a Day. Take 1 cap po QID), Disp: 360 capsule, Rfl: 0  •  HYDROcod Polst-CPM Polst ER (TUSSIONEX PENNKINETIC ER) 10-8 MG/5ML ER suspension, Use 5 ml for severe coughing, no driving on this, Disp: 115 mL, Rfl: 0  •  ketoconazole (NIZORAL) 2 % cream, Apply 1  "application topically Every 12 (Twelve) Hours. Shampoo weekly, Disp: , Rfl:   •  ketoconazole (NIZORAL) 2 % shampoo, Apply  topically 1 (One) Time Per Week., Disp: , Rfl:   •  levothyroxine (SYNTHROID, LEVOTHROID) 75 MCG tablet, TAKE 1 TABLET BY MOUTH  DAILY, Disp: 90 tablet, Rfl: 1  •  Omega-3 Fatty Acids (FISH OIL PO), Take 1 capsule by mouth Daily. Stopped one week prior to surgery, Disp: , Rfl:   •  ondansetron (ZOFRAN) 4 MG tablet, Take 1 tablet by mouth Every 8 (Eight) Hours As Needed for Nausea or Vomiting., Disp: 10 tablet, Rfl: 0  •  PREMARIN 0.625 MG tablet, TAKE 1 TABLET BY MOUTH  EVERY DAY, Disp: 90 tablet, Rfl: 0  •  RABEprazole (ACIPHEX) 20 MG EC tablet, TAKE 1 TABLET BY MOUTH  EVERY DAY, Disp: 90 tablet, Rfl: 0  •  doxycycline (VIBRAMYCIN) 100 MG capsule, Take 1 capsule by mouth 2 (Two) Times a Day., Disp: 14 capsule, Rfl: 0    Review of Systems   Constitutional: Positive for chills and fatigue. Negative for fever.   HENT: Positive for congestion. Negative for ear pain and sinus pressure.    Respiratory: Positive for cough, chest tightness, shortness of breath and wheezing.    Cardiovascular: Negative for chest pain and palpitations.   Gastrointestinal: Negative for abdominal pain, blood in stool and constipation.   Skin: Negative for color change.   Allergic/Immunologic: Negative for environmental allergies.   Neurological: Positive for dizziness. Negative for speech difficulty and headache.   Psychiatric/Behavioral: Negative for decreased concentration. The patient is not nervous/anxious.         Vital Signs  Vitals:    08/23/19 1138   BP: 110/72   BP Location: Left arm   Patient Position: Sitting   Cuff Size: Adult   Pulse: 88   Temp: 97.4 °F (36.3 °C)   Weight: 63 kg (139 lb)   Height: 162.6 cm (64.02\")   PainSc: 0-No pain       Physical Exam   Constitutional: She is oriented to person, place, and time. She appears well-developed and well-nourished.   HENT:   Head: Normocephalic and atraumatic. "   Cardiovascular: Normal rate, regular rhythm and normal heart sounds.   No murmur heard.  Pulmonary/Chest: Effort normal. She has rhonchi in the right lower field and the left lower field.   Neurological: She is alert and oriented to person, place, and time.   Psychiatric: She has a normal mood and affect.   Vitals reviewed.     Procedures    ACE III MINI             Assessment/Plan:    Alanna was seen today for cough, fatigue, dizziness and nausea.    Diagnoses and all orders for this visit:    Bronchitis    Other orders  -     doxycycline (VIBRAMYCIN) 100 MG capsule; Take 1 capsule by mouth 2 (Two) Times a Day.    Plan    Persistent lower respiratory symptoms and night sweats although somewhat improved after a course of antibiotics.  Given the persistence of significant symptoms, she is given doxycycline 100 mg twice a day.  She will return next week.  She will continue hydrocodone for cough.  If not better next week would obtain chest x-ray and if that shows chronic changes only would consider CT scan.        Plan of care reviewed with patient at the conclusion of today's visit. Education was provided regarding diagnosis, management, and any prescribed or recommended OTC medications.Patient verbalizes understanding of and agreement with management plan.         Ismael Jones MD

## 2019-08-29 ENCOUNTER — OFFICE VISIT (OUTPATIENT)
Dept: INTERNAL MEDICINE | Facility: CLINIC | Age: 79
End: 2019-08-29

## 2019-08-29 VITALS
SYSTOLIC BLOOD PRESSURE: 112 MMHG | DIASTOLIC BLOOD PRESSURE: 74 MMHG | HEART RATE: 68 BPM | HEIGHT: 64 IN | WEIGHT: 140 LBS | BODY MASS INDEX: 23.9 KG/M2

## 2019-08-29 DIAGNOSIS — J40 BRONCHITIS: Primary | ICD-10-CM

## 2019-08-29 PROCEDURE — 99213 OFFICE O/P EST LOW 20 MIN: CPT | Performed by: INTERNAL MEDICINE

## 2019-08-29 NOTE — PROGRESS NOTES
[facility]     Alanna Rodriges  1940   [id number]      Patient Care Team:  Ismael Jones MD as PCP - General  Romaine Tanner MD as Consulting Physician (Pain Medicine)  Edmond Mccrary MD as Consulting Physician (Neurosurgery)  Sherice Carrington PA-C as Physician Assistant (Neurosurgery)    Chief Complaint::   Chief Complaint   Patient presents with   • Bronchitis     follow up        HPI  Ms. Rodriges comes in for follow-up of her bronchitis.  She has nearly completed her course of doxycycline.  She is clearly better, with only one episode of night sweats since last visit, and minimal episodes grade fever.  Cough is better, her appetite is good and she is regaining strength.    Chronic Conditions:      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 or radiculopathy   • Neuroforaminal stenosis of spine   • Leg length discrepancy   • Ulnar neuropathy at elbow of right upper extremity   • CTS (carpal tunnel syndrome)   • Carpal tunnel syndrome of right wrist   • Displacement of intervertebral disc of lumbosacral region   • Anxiety state   • Acute non intractable tension-type headache   • Asthma   • Bilateral hearing loss   • BMI 26.0-26.9,adult   • Depression   • Dysuria   • GERD (gastroesophageal reflux disease)   • Hypothyroidism   • Impacted cerumen, bilateral   • Irritable bowel syndrome   • Medicare annual wellness visit, subsequent   • Mixed hyperlipidemia   • Osteoarthrosis   • Disc disorder of cervical region   • Vitamin D deficiency   • Numbness of fingers        Past Medical History:   Diagnosis Date   • Anemia    • Anxiety disorder    • Arthritis    • Cataract    • Depression    • Dermatitis    • DVT (deep venous thrombosis) (CMS/[facility])     1975   • Elbow pain    • GERD  (gastroesophageal reflux disease)    • History of blood transfusion    • History of hepatitis C virus infection    • Hypothyroidism    • Kidney stones    • Migraine     occular   • PONV (postoperative nausea and vomiting)    • Ptosis due to aging    • Seborrhea    • Seizure (CMS/HCC)     during hospitalization as a side effect of Tofranil    • Shoulder pain     right   • Ulcerative colitis (CMS/HCC)    • Ulnar nerve compression, right        Past Surgical History:   Procedure Laterality Date   • BUNIONECTOMY Bilateral    • CARPAL TUNNEL RELEASE Right 2018    Procedure: CARPAL TUNNEL RELEASE RIGHT ;  Surgeon: Edmond Mccrary MD;  Location:  [facility];  Service:    • CARPAL TUNNEL RELEASE Right    • COLECTOMY TOTAL     • COLON SURGERY      resection   • D&C AND LAPAROSCOPY     • FACELIFT  2015    chemical peel   • HYSTERECTOMY      bso   • ILEOSTOMY     • SKIN BIOPSY     • ULNAR NERVE DECOMPRESSION Right 2016    Procedure: RIGHT ULNAR NERVE DECOMPRESSION;  Surgeon: Edmond Mccrary MD;  Location:  [facility];  Service:    • URETHRAL DILATION      multiple       Family History   Problem Relation Age of Onset   • Alzheimer's disease Other    • Leukemia Other    • Cancer Maternal Grandmother    • Coronary artery disease Maternal Grandfather    • Breast cancer Neg Hx    • Ovarian cancer Neg Hx        Social History     Socioeconomic History   • Marital status:      Spouse name: Not on file   • Number of children: Not on file   • Years of education: Not on file   • Highest education level: Not on file   Tobacco Use   • Smoking status: Former Smoker     Types: Cigarettes     Last attempt to quit:      Years since quittin.6   • Smokeless tobacco: Never Used   • Tobacco comment: quit smoking in her 40's    Substance and Sexual Activity   • Alcohol use: Yes     Alcohol/week: 8.4 oz     Types: 14 Glasses of wine per week     Frequency: 4 or more times a week     Drinks per session: 1  or 2   • Drug use: No   • Sexual activity: Defer       Allergies   Allergen Reactions   • Codeine Nausea Only   • Morphine And Related Hallucinations     And itching After 3 days         Current Outpatient Medications:   •  acyclovir (ZOVIRAX) 400 MG tablet, Take 400 mg by mouth 2 (Two) Times a Day. Take no more than 5 doses a day., Disp: , Rfl:   •  aspirin 81 MG EC tablet, Take 81 mg by mouth Daily. Last dose 11/28/2016, Disp: , Rfl:   •  atorvastatin (LIPITOR) 10 MG tablet, TAKE 1 TABLET BY MOUTH  EVERY DAY, Disp: 90 tablet, Rfl: 0  •  cholecalciferol (VITAMIN D3) 1000 units tablet, Take 1,000 Units by mouth Daily., Disp: , Rfl:   •  dicyclomine (BENTYL) 10 MG capsule, TAKE 1 CAPSULE BY MOUTH  EVERY NIGHT, Disp: 90 capsule, Rfl: 0  •  doxycycline (VIBRAMYCIN) 100 MG capsule, Take 1 capsule by mouth 2 (Two) Times a Day., Disp: 14 capsule, Rfl: 0  •  gabapentin (NEURONTIN) 100 MG capsule, Take 1 cap po QID (Patient taking differently: Take 100 mg by mouth 3 (Three) Times a Day. Take 1 cap po QID), Disp: 360 capsule, Rfl: 0  •  HYDROcod Polst-CPM Polst ER (TUSSIONEX PENNKINETIC ER) 10-8 MG/5ML ER suspension, Use 5 ml for severe coughing, no driving on this, Disp: 115 mL, Rfl: 0  •  ketoconazole (NIZORAL) 2 % cream, Apply 1 application topically Every 12 (Twelve) Hours. Shampoo weekly, Disp: , Rfl:   •  ketoconazole (NIZORAL) 2 % shampoo, Apply  topically 1 (One) Time Per Week., Disp: , Rfl:   •  levothyroxine (SYNTHROID, LEVOTHROID) 75 MCG tablet, TAKE 1 TABLET BY MOUTH  DAILY, Disp: 90 tablet, Rfl: 1  •  Omega-3 Fatty Acids (FISH OIL PO), Take 1 capsule by mouth Daily. Stopped one week prior to surgery, Disp: , Rfl:   •  ondansetron (ZOFRAN) 4 MG tablet, Take 1 tablet by mouth Every 8 (Eight) Hours As Needed for Nausea or Vomiting., Disp: 10 tablet, Rfl: 0  •  PREMARIN 0.625 MG tablet, TAKE 1 TABLET BY MOUTH  EVERY DAY, Disp: 90 tablet, Rfl: 0  •  RABEprazole (ACIPHEX) 20 MG EC tablet, TAKE 1 TABLET BY MOUTH  EVERY  "DAY, Disp: 90 tablet, Rfl: 0  •  albuterol sulfate  (90 Base) MCG/ACT inhaler, Inhale 2 puffs Every 4 (Four) Hours As Needed for Wheezing or Shortness of Air., Disp: 1 inhaler, Rfl: 0  •  azithromycin (ZITHROMAX Z-PURA) 250 MG tablet, Take 2 tablets the first day, then 1 tablet daily for 4 days., Disp: 6 tablet, Rfl: 0    Review of Systems   Constitutional: Positive for fatigue. Negative for chills and fever.   HENT: Positive for congestion. Negative for ear pain and sinus pressure.    Respiratory: Positive for cough, shortness of breath and wheezing. Negative for chest tightness.    Cardiovascular: Negative for chest pain and palpitations.   Gastrointestinal: Negative for abdominal pain, blood in stool and constipation.   Skin: Negative for color change.   Allergic/Immunologic: Negative for environmental allergies.   Neurological: Positive for headache. Negative for dizziness and speech difficulty.   Psychiatric/Behavioral: Negative for decreased concentration. The patient is not nervous/anxious.         Vital Signs  Vitals:    08/29/19 1508   BP: 112/74   BP Location: Left arm   Patient Position: Sitting   Cuff Size: Adult   Pulse: 68   Weight: 63.5 kg (140 lb)   Height: 162.6 cm (64.02\")   PainSc: 0-No pain       Physical Exam   Constitutional: She is oriented to person, place, and time. She appears well-developed and well-nourished.   HENT:   Head: Normocephalic and atraumatic.   Cardiovascular: Normal rate, regular rhythm and normal heart sounds.   No murmur heard.  Pulmonary/Chest: Effort normal and breath sounds normal.   Neurological: She is alert and oriented to person, place, and time.   Psychiatric: She has a normal mood and affect.   Vitals reviewed.     Procedures    ACE III MINI             Assessment/Plan:    Alanna was seen today for bronchitis.    Diagnoses and all orders for this visit:    Bronchitis    Plan    Clearly improving, it is not clear at this point if this was viral or bacterial, " although doxycycline seemed to help.  Either way, it is clearly atypical.  At this point I think her residual symptoms are due to residual inflammation, not active infection.  She will complete her current course of doxycycline.  Only if she has recurrent fevers or night sweats would further antibiotic therapy be indicated.      Plan of care reviewed with patient at the conclusion of today's visit. Education was provided regarding diagnosis, management, and any prescribed or recommended OTC medications.Patient verbalizes understanding of and agreement with management plan.         Ismael Jones MD

## 2019-09-10 RX ORDER — RABEPRAZOLE SODIUM 20 MG/1
TABLET, DELAYED RELEASE ORAL
Qty: 90 TABLET | Refills: 1 | Status: SHIPPED | OUTPATIENT
Start: 2019-09-10 | End: 2020-03-09 | Stop reason: SDUPTHER

## 2019-09-16 ENCOUNTER — TELEPHONE (OUTPATIENT)
Dept: PAIN MEDICINE | Facility: CLINIC | Age: 79
End: 2019-09-16

## 2019-09-16 DIAGNOSIS — M51.27 DISPLACEMENT OF INTERVERTEBRAL DISC OF LUMBOSACRAL REGION: ICD-10-CM

## 2019-09-16 DIAGNOSIS — M43.17 SPONDYLOLISTHESIS OF LUMBOSACRAL REGION: ICD-10-CM

## 2019-09-16 DIAGNOSIS — M51.36 DDD (DEGENERATIVE DISC DISEASE), LUMBAR: ICD-10-CM

## 2019-09-16 DIAGNOSIS — M47.816 SPONDYLOSIS OF LUMBAR REGION WITHOUT MYELOPATHY OR RADICULOPATHY: ICD-10-CM

## 2019-09-16 DIAGNOSIS — M48.00 NEUROFORAMINAL STENOSIS OF SPINE: Primary | ICD-10-CM

## 2019-09-16 RX ORDER — CONJUGATED ESTROGENS 0.62 MG/1
TABLET, FILM COATED ORAL
Qty: 90 TABLET | Refills: 1 | Status: SHIPPED | OUTPATIENT
Start: 2019-09-16 | End: 2020-02-12

## 2019-09-16 NOTE — TELEPHONE ENCOUNTER
"I left the patient a detailed message letting her know about the referral  ----- Message from Romaine Tanner MD sent at 9/16/2019  2:11 PM EDT -----  \"If patient fails to obtain sustained pain relief, then, I will obtain neurosurgical consultation.\"    She has seen Dr Mccrary in the past. Please refer back. Two weeks is not long enough to do an epidural.   If she is not surgical, we could look into a scs device    ----- Message -----  From: Jessi Villafana  Sent: 9/16/2019   1:57 PM  To: Romaine Tanner MD    Pt called today to schedule an appt .  She was last seen by rc 7/23 his poc stated   1.  Interventional pain management measures: None indicated.  Patient could be scheduled for repeat right L4-L5 and right L5-S1 transforaminal epidural steroid injections if pain recurs.  We would consider neurosurgical consultation if she continued to struggle with pain.    Patient states that she got 100% relief for about 2 weeks after procedure then the pain started to return. She states that her pain is now back 100% to where she was before the procedure.  I went over poc with her and she wanted to do what ever you thought was best.     Please advise       "

## 2019-09-18 ENCOUNTER — HOSPITAL ENCOUNTER (OUTPATIENT)
Dept: MAMMOGRAPHY | Facility: HOSPITAL | Age: 79
Discharge: HOME | End: 2019-09-18

## 2019-09-18 ENCOUNTER — HOSPITAL ENCOUNTER (OUTPATIENT)
Dept: BONE DENSITY | Facility: HOSPITAL | Age: 79
Discharge: HOME | End: 2019-09-18
Admitting: NURSE PRACTITIONER

## 2019-09-18 DIAGNOSIS — Z12.31 VISIT FOR SCREENING MAMMOGRAM: ICD-10-CM

## 2019-09-18 DIAGNOSIS — Z78.0 POSTMENOPAUSAL: ICD-10-CM

## 2019-09-18 PROCEDURE — 77063 BREAST TOMOSYNTHESIS BI: CPT

## 2019-09-18 PROCEDURE — 77067 SCR MAMMO BI INCL CAD: CPT

## 2019-09-18 PROCEDURE — 77067 SCR MAMMO BI INCL CAD: CPT | Performed by: RADIOLOGY

## 2019-09-18 PROCEDURE — 77080 DXA BONE DENSITY AXIAL: CPT

## 2019-09-18 PROCEDURE — 77063 BREAST TOMOSYNTHESIS BI: CPT | Performed by: RADIOLOGY

## 2019-09-24 ENCOUNTER — FLU SHOT (OUTPATIENT)
Dept: INTERNAL MEDICINE | Facility: CLINIC | Age: 79
End: 2019-09-24

## 2019-09-24 DIAGNOSIS — Z23 IMMUNIZATION, PNEUMOCOCCUS AND INFLUENZA: ICD-10-CM

## 2019-09-24 PROCEDURE — G0008 ADMIN INFLUENZA VIRUS VAC: HCPCS | Performed by: INTERNAL MEDICINE

## 2019-09-24 PROCEDURE — 90653 IIV ADJUVANT VACCINE IM: CPT | Performed by: INTERNAL MEDICINE

## 2019-10-01 ENCOUNTER — OFFICE VISIT (OUTPATIENT)
Dept: NEUROSURGERY | Facility: CLINIC | Age: 79
End: 2019-10-01

## 2019-10-01 VITALS
SYSTOLIC BLOOD PRESSURE: 122 MMHG | TEMPERATURE: 98.3 F | HEIGHT: 64 IN | DIASTOLIC BLOOD PRESSURE: 60 MMHG | WEIGHT: 145 LBS | BODY MASS INDEX: 24.75 KG/M2

## 2019-10-01 DIAGNOSIS — R20.0 NUMBNESS OF FINGERS: Primary | ICD-10-CM

## 2019-10-01 DIAGNOSIS — G56.01 CARPAL TUNNEL SYNDROME OF RIGHT WRIST: ICD-10-CM

## 2019-10-01 DIAGNOSIS — M79.643 PAIN OF HAND, UNSPECIFIED LATERALITY: ICD-10-CM

## 2019-10-01 PROCEDURE — 99213 OFFICE O/P EST LOW 20 MIN: CPT | Performed by: NEUROLOGICAL SURGERY

## 2019-10-01 NOTE — PROGRESS NOTES
Alanna Rodriges  1940  [id number]                        CHIEF COMPLAINT: Follow-up back pain, median and ulnar neuropathy         MEDICAL HISTORY SINCE LAST ENCOUNTER: 78-year-old female returns for follow-up visit subsequent decompression of the ulnar and median nerve on the right.  Prior to surgery she had marked atrophy and weakness.  She was concerned that the may have progressed.  Diagnostic studies were repeated and she returns for follow-up.           Past Medical History:   Diagnosis Date   • Anemia    • Anxiety disorder    • Arthritis    • Cataract    • Depression    • Dermatitis    • DVT (deep venous thrombosis) (CMS/HCC)     1975   • Elbow pain    • GERD (gastroesophageal reflux disease)    • History of blood transfusion    • History of hepatitis C virus infection    • Hypothyroidism    • Kidney stones    • Migraine     occular   • PONV (postoperative nausea and vomiting)    • Ptosis due to aging    • Seborrhea    • Seizure (CMS/HCC) 1975    during hospitalization as a side effect of Tofranil    • Shoulder pain     right   • Ulcerative colitis (CMS/HCC)    • Ulnar nerve compression, right               Past Surgical History:   Procedure Laterality Date   • BUNIONECTOMY Bilateral    • CARPAL TUNNEL RELEASE Right 1/5/2018    Procedure: CARPAL TUNNEL RELEASE RIGHT ;  Surgeon: Edmond Mccrary MD;  Location:  [facility];  Service:    • CARPAL TUNNEL RELEASE Right 2017   • COLECTOMY TOTAL     • COLON SURGERY  1975    resection   • D&C AND LAPAROSCOPY     • FACELIFT  01/2015    chemical peel   • HYSTERECTOMY  1994    bso   • ILEOSTOMY     • SKIN BIOPSY     • ULNAR NERVE DECOMPRESSION Right 12/9/2016    Procedure: RIGHT ULNAR NERVE DECOMPRESSION;  Surgeon: Edmond Mccrary MD;  Location:  [facility];  Service:    • URETHRAL DILATION      multiple              Family History   Problem Relation Age of Onset   • Alzheimer's disease Other    • Leukemia Other    • Cancer Maternal Grandmother    • Coronary artery  disease Maternal Grandfather    • Breast cancer Neg Hx    • Ovarian cancer Neg Hx               Social History     Socioeconomic History   • Marital status:      Spouse name: Not on file   • Number of children: Not on file   • Years of education: Not on file   • Highest education level: Not on file   Tobacco Use   • Smoking status: Former Smoker     Types: Cigarettes     Last attempt to quit:      Years since quittin.7   • Smokeless tobacco: Never Used   • Tobacco comment: quit smoking in her 40's    Substance and Sexual Activity   • Alcohol use: Yes     Alcohol/week: 8.4 oz     Types: 14 Glasses of wine per week     Frequency: 4 or more times a week     Drinks per session: 1 or 2   • Drug use: No   • Sexual activity: Defer              Allergies   Allergen Reactions   • Codeine Nausea Only   • Morphine And Related Hallucinations     And itching After 3 days              Current Outpatient Medications:   •  acyclovir (ZOVIRAX) 400 MG tablet, Take 400 mg by mouth 2 (Two) Times a Day. Take no more than 5 doses a day., Disp: , Rfl:   •  albuterol sulfate  (90 Base) MCG/ACT inhaler, Inhale 2 puffs Every 4 (Four) Hours As Needed for Wheezing or Shortness of Air., Disp: 1 inhaler, Rfl: 0  •  aspirin 81 MG EC tablet, Take 81 mg by mouth Daily. Last dose 2016, Disp: , Rfl:   •  atorvastatin (LIPITOR) 10 MG tablet, TAKE 1 TABLET BY MOUTH  EVERY DAY, Disp: 90 tablet, Rfl: 0  •  cholecalciferol (VITAMIN D3) 1000 units tablet, Take 1,000 Units by mouth Daily., Disp: , Rfl:   •  dicyclomine (BENTYL) 10 MG capsule, TAKE 1 CAPSULE BY MOUTH  EVERY NIGHT, Disp: 90 capsule, Rfl: 0  •  doxycycline (VIBRAMYCIN) 100 MG capsule, Take 1 capsule by mouth 2 (Two) Times a Day., Disp: 14 capsule, Rfl: 0  •  gabapentin (NEURONTIN) 100 MG capsule, Take 1 cap po QID (Patient taking differently: Take 100 mg by mouth 3 (Three) Times a Day. Take 1 cap po QID), Disp: 360 capsule, Rfl: 0  •  HYDROcod Polst-CPM Polst ER  (TUSSIONEX PENNKINETIC ER) 10-8 MG/5ML ER suspension, Use 5 ml for severe coughing, no driving on this, Disp: 115 mL, Rfl: 0  •  ketoconazole (NIZORAL) 2 % cream, Apply 1 application topically Every 12 (Twelve) Hours. Shampoo weekly, Disp: , Rfl:   •  ketoconazole (NIZORAL) 2 % shampoo, Apply  topically 1 (One) Time Per Week., Disp: , Rfl:   •  levothyroxine (SYNTHROID, LEVOTHROID) 75 MCG tablet, TAKE 1 TABLET BY MOUTH  DAILY, Disp: 90 tablet, Rfl: 1  •  Omega-3 Fatty Acids (FISH OIL PO), Take 1 capsule by mouth Daily. Stopped one week prior to surgery, Disp: , Rfl:   •  ondansetron (ZOFRAN) 4 MG tablet, Take 1 tablet by mouth Every 8 (Eight) Hours As Needed for Nausea or Vomiting., Disp: 10 tablet, Rfl: 0  •  PREMARIN 0.625 MG tablet, TAKE 1 TABLET BY MOUTH  EVERY DAY, Disp: 90 tablet, Rfl: 1  •  RABEprazole (ACIPHEX) 20 MG EC tablet, TAKE 1 TABLET BY MOUTH  EVERY DAY, Disp: 90 tablet, Rfl: 1         Review of Systems   Constitutional: Positive for fatigue. Negative for activity change, appetite change, chills, diaphoresis, fever and unexpected weight change.   HENT: Negative for congestion, dental problem, drooling, ear discharge, ear pain, facial swelling, hearing loss, mouth sores, nosebleeds, postnasal drip, rhinorrhea, sinus pressure, sneezing, sore throat, tinnitus, trouble swallowing and voice change.    Eyes: Negative for photophobia, pain, discharge, redness, itching and visual disturbance.   Respiratory: Positive for shortness of breath. Negative for apnea, cough, choking, chest tightness, wheezing and stridor.    Cardiovascular: Negative for chest pain, palpitations and leg swelling.   Gastrointestinal: Negative for abdominal distention, abdominal pain, anal bleeding, blood in stool, constipation, diarrhea, nausea, rectal pain and vomiting.   Endocrine: Negative for cold intolerance, heat intolerance, polydipsia, polyphagia and polyuria.   Genitourinary: Negative for decreased urine volume, difficulty  "urinating, dysuria, enuresis, flank pain, frequency, genital sores, hematuria and urgency.   Musculoskeletal: Positive for arthralgias, back pain, neck pain and neck stiffness. Negative for gait problem, joint swelling and myalgias.   Skin: Negative for color change, pallor, rash and wound.   Allergic/Immunologic: Negative for environmental allergies, food allergies and immunocompromised state.   Neurological: Positive for dizziness, weakness and light-headedness. Negative for tremors, seizures, syncope, facial asymmetry, speech difficulty, numbness and headaches.   Hematological: Negative for adenopathy. Does not bruise/bleed easily.   Psychiatric/Behavioral: Negative for agitation, behavioral problems, confusion, decreased concentration, dysphoric mood, hallucinations, self-injury, sleep disturbance and suicidal ideas. The patient is not nervous/anxious and is not hyperactive.                Vitals:    10/01/19 1401   BP: 122/60   BP Location: Right arm   Patient Position: Sitting   Temp: 98.3 °F (36.8 °C)   TempSrc: Temporal   Weight: 65.8 kg (145 lb)   Height: 162.6 cm (64\")               EXAMINATION: She has significant atrophy in the first dorsal interosseous and the thenar eminence.  Lamination unchanged otherwise.            MEDICAL DECISION MAKING: EMG and NCV show residual change but no progression.           ASSESSMENT/DISPOSITION: She will return to see us on an as-needed basis.              I APPRECIATE THE OPPORTUNITY OF THIS REFERRAL. PLEASE CALL IF ANY       QUESTIONS [phone]    Scribed for Edmond Mccrary MD by Ninoska Gomes CMA. 10/1/2019  2:16 PM    I have read and concur with the information provided by the scribe.  Edmond Mccrary MD    "

## 2019-10-03 RX ORDER — DICYCLOMINE HYDROCHLORIDE 10 MG/1
10 CAPSULE ORAL NIGHTLY
Qty: 90 CAPSULE | Refills: 1 | Status: SHIPPED | OUTPATIENT
Start: 2019-10-03 | End: 2019-11-13 | Stop reason: SDUPTHER

## 2019-10-03 RX ORDER — LEVOTHYROXINE SODIUM 0.07 MG/1
75 TABLET ORAL DAILY
Qty: 90 TABLET | Refills: 1 | OUTPATIENT
Start: 2019-10-03

## 2019-10-18 ENCOUNTER — APPOINTMENT (OUTPATIENT)
Dept: MAMMOGRAPHY | Facility: HOSPITAL | Age: 79
End: 2019-10-18

## 2019-10-18 RX ORDER — LEVOTHYROXINE SODIUM 0.07 MG/1
75 TABLET ORAL DAILY
Qty: 90 TABLET | Refills: 1 | Status: SHIPPED | OUTPATIENT
Start: 2019-10-18 | End: 2020-03-09

## 2019-11-13 RX ORDER — DICYCLOMINE HYDROCHLORIDE 10 MG/1
10 CAPSULE ORAL NIGHTLY
Qty: 90 CAPSULE | Refills: 1 | OUTPATIENT
Start: 2019-11-13

## 2019-11-13 RX ORDER — ACYCLOVIR 400 MG/1
400 TABLET ORAL 2 TIMES DAILY
Qty: 180 TABLET | Refills: 3 | Status: SHIPPED | OUTPATIENT
Start: 2019-11-13 | End: 2019-11-13 | Stop reason: SDUPTHER

## 2019-11-13 NOTE — TELEPHONE ENCOUNTER
[facility] called and said that pt is visiting [facility] and needs an emergency refill  Dicyclomine 10 mg, 1 daily  Also needing the Acyclovir, 400 mgs 2 kusum    Needs to be faxed to pharmacy  [facility], phone [phone] fax: [phone]    [facility] asked if we could do 45 days, as they cant ship until Dec 10th, and takes about 7 days to get it, and she wont have enough medication unless its written for possibly 45 days

## 2019-11-14 RX ORDER — ACYCLOVIR 400 MG/1
400 TABLET ORAL 2 TIMES DAILY
Qty: 90 TABLET | Refills: 0 | Status: SHIPPED | OUTPATIENT
Start: 2019-11-14 | End: 2019-11-18 | Stop reason: SDUPTHER

## 2019-11-14 RX ORDER — DICYCLOMINE HYDROCHLORIDE 10 MG/1
10 CAPSULE ORAL NIGHTLY
Qty: 45 CAPSULE | Refills: 0 | Status: SHIPPED | OUTPATIENT
Start: 2019-11-14 | End: 2020-03-09

## 2019-11-18 RX ORDER — ACYCLOVIR 400 MG/1
400 TABLET ORAL 2 TIMES DAILY
Qty: 180 TABLET | Refills: 0 | Status: SHIPPED | OUTPATIENT
Start: 2019-11-18 | End: 2019-12-04 | Stop reason: SDUPTHER

## 2019-11-22 ENCOUNTER — TELEPHONE (OUTPATIENT)
Dept: INTERNAL MEDICINE | Facility: CLINIC | Age: 79
End: 2019-11-22

## 2019-11-22 NOTE — TELEPHONE ENCOUNTER
""[employer] called the Hub at 10:56 am and left the following message    \"Good morning my name is Viry I'm calling from [facility] calling in regards to our mutual patient her name is Alanna Rodriges. Her YOB: 1940. We received a prescription dated November 13 for Acyclovir 400 mg and we needed to clarify the frequency because the prescription says for the patient it's one tablet two times a day but it says take no more than five doses in one day. So you know we needed to verify if it's twice a day or there frequency five times a day. If you could please call us back to clarify this information. Our call back no. is [phone] and when calling back please use the reference no. [id number]. We are available well, if you could call back within 24 hours with this clarification is greatly appreciated. Thank you and have a great day.\"    "

## 2019-11-26 RX ORDER — ATORVASTATIN CALCIUM 10 MG/1
TABLET, FILM COATED ORAL
Qty: 90 TABLET | Refills: 1 | Status: SHIPPED | OUTPATIENT
Start: 2019-11-26 | End: 2020-04-29

## 2019-12-03 ENCOUNTER — TELEPHONE (OUTPATIENT)
Dept: INTERNAL MEDICINE | Facility: CLINIC | Age: 79
End: 2019-12-03

## 2019-12-04 RX ORDER — ACYCLOVIR 400 MG/1
400 TABLET ORAL 2 TIMES DAILY
Qty: 180 TABLET | Refills: 0 | Status: SHIPPED | OUTPATIENT
Start: 2019-12-04 | End: 2020-04-29

## 2019-12-04 RX ORDER — ALPRAZOLAM 0.5 MG/1
0.5 TABLET ORAL 2 TIMES DAILY PRN
Qty: 30 TABLET | Refills: 0 | Status: SHIPPED | OUTPATIENT
Start: 2019-12-04 | End: 2022-10-04 | Stop reason: SDUPTHER

## 2020-01-02 DIAGNOSIS — M47.816 SPONDYLOSIS OF LUMBAR REGION WITHOUT MYELOPATHY OR RADICULOPATHY: Primary | ICD-10-CM

## 2020-01-02 RX ORDER — GABAPENTIN 100 MG/1
CAPSULE ORAL
Qty: 270 CAPSULE | Refills: 0 | Status: SHIPPED | OUTPATIENT
Start: 2020-01-02 | End: 2020-03-09

## 2020-01-02 NOTE — TELEPHONE ENCOUNTER
Left voice mail for patient to call office to let us know how she is taking the Gabapentin 100mg.  Received request from [facility]

## 2020-02-12 RX ORDER — CONJUGATED ESTROGENS 0.62 MG/1
TABLET, FILM COATED ORAL
Qty: 90 TABLET | Refills: 1 | Status: SHIPPED | OUTPATIENT
Start: 2020-02-12 | End: 2020-07-28

## 2020-03-09 ENCOUNTER — TELEPHONE (OUTPATIENT)
Dept: INTERNAL MEDICINE | Facility: CLINIC | Age: 80
End: 2020-03-09

## 2020-03-09 DIAGNOSIS — M47.816 SPONDYLOSIS OF LUMBAR REGION WITHOUT MYELOPATHY OR RADICULOPATHY: ICD-10-CM

## 2020-03-09 RX ORDER — DICYCLOMINE HYDROCHLORIDE 10 MG/1
10 CAPSULE ORAL NIGHTLY
Qty: 45 CAPSULE | Refills: 0 | Status: SHIPPED | OUTPATIENT
Start: 2020-03-09 | End: 2020-04-29

## 2020-03-09 RX ORDER — RABEPRAZOLE SODIUM 20 MG/1
20 TABLET, DELAYED RELEASE ORAL DAILY
Qty: 90 TABLET | Refills: 1 | Status: SHIPPED | OUTPATIENT
Start: 2020-03-09 | End: 2020-09-28

## 2020-03-09 RX ORDER — LEVOTHYROXINE SODIUM 0.07 MG/1
75 TABLET ORAL DAILY
Qty: 90 TABLET | Refills: 1 | Status: SHIPPED | OUTPATIENT
Start: 2020-03-09 | End: 2020-08-03 | Stop reason: SDUPTHER

## 2020-03-09 RX ORDER — GABAPENTIN 100 MG/1
CAPSULE ORAL
Qty: 270 CAPSULE | Refills: 0 | Status: SHIPPED | OUTPATIENT
Start: 2020-03-09 | End: 2020-06-12

## 2020-03-09 NOTE — TELEPHONE ENCOUNTER
Patient requesting a call back to speak to the provider/nurse. The patient is concerned about flying and would like to know if Dr. Jones could provide her with a statement for the airline so she can cancel a flight without penalty. She is requesting that it states the reason is because of her compromised immune system and age. She is requesting to have this completed by the end of the week or early next week.   Please call patient back and advise. Patient’s call back number is: [phone]

## 2020-03-09 NOTE — TELEPHONE ENCOUNTER
[facility] notified [facility] [address] [place] [address]  [id number]. Letter mailed

## 2020-04-28 NOTE — TELEPHONE ENCOUNTER
PT CALLED AND SAID THAT [facility] WOULD BE SENDING IN REQUEST FOR 3 MEDICATIONS:   DICYCLOMINE 10 MG  ATORVASTATIN 10 MG  ACYCLOVIR 400 ,G    [facility]

## 2020-04-29 RX ORDER — ACYCLOVIR 400 MG/1
TABLET ORAL
Qty: 180 TABLET | Refills: 0 | Status: SHIPPED | OUTPATIENT
Start: 2020-04-29 | End: 2020-06-15 | Stop reason: SDUPTHER

## 2020-04-29 RX ORDER — ATORVASTATIN CALCIUM 10 MG/1
TABLET, FILM COATED ORAL
Qty: 90 TABLET | Refills: 1 | Status: SHIPPED | OUTPATIENT
Start: 2020-04-29 | End: 2020-07-22

## 2020-04-29 RX ORDER — DICYCLOMINE HYDROCHLORIDE 10 MG/1
10 CAPSULE ORAL NIGHTLY
Qty: 45 CAPSULE | Refills: 0 | Status: SHIPPED | OUTPATIENT
Start: 2020-04-29 | End: 2020-06-12

## 2020-06-04 RX ORDER — KETOCONAZOLE 20 MG/ML
SHAMPOO TOPICAL WEEKLY
Qty: 1 EACH | Refills: 3 | Status: SHIPPED | OUTPATIENT
Start: 2020-06-04

## 2020-06-12 DIAGNOSIS — M47.816 SPONDYLOSIS OF LUMBAR REGION WITHOUT MYELOPATHY OR RADICULOPATHY: ICD-10-CM

## 2020-06-12 RX ORDER — ACYCLOVIR 400 MG/1
TABLET ORAL
Qty: 180 TABLET | Refills: 0 | OUTPATIENT
Start: 2020-06-12

## 2020-06-12 RX ORDER — RABEPRAZOLE SODIUM 20 MG/1
TABLET, DELAYED RELEASE ORAL
Qty: 90 TABLET | Refills: 1 | OUTPATIENT
Start: 2020-06-12

## 2020-06-12 RX ORDER — DICYCLOMINE HYDROCHLORIDE 10 MG/1
10 CAPSULE ORAL NIGHTLY
Qty: 30 CAPSULE | Refills: 5 | Status: SHIPPED | OUTPATIENT
Start: 2020-06-12 | End: 2021-04-06 | Stop reason: SDUPTHER

## 2020-06-12 RX ORDER — GABAPENTIN 100 MG/1
CAPSULE ORAL
Qty: 270 CAPSULE | Refills: 0 | Status: SHIPPED | OUTPATIENT
Start: 2020-06-12 | End: 2020-07-22

## 2020-06-12 NOTE — TELEPHONE ENCOUNTER
LS: 08/29/19  NA: 08/03/20  LA: Gabapentin #270 0R  K: Printed for review     Dicyclomine last approved 03/09/20 for 1 tab daily but the rx called in was for #45. Qty and directions dont match. Please advise.         RX too early to fill so they were denied:  Aciphex not due until 09/2020.  Acyclovir not due until 07/29/20.

## 2020-06-15 RX ORDER — ACYCLOVIR 400 MG/1
400 TABLET ORAL 2 TIMES DAILY
Qty: 180 TABLET | Refills: 0 | Status: SHIPPED | OUTPATIENT
Start: 2020-06-15 | End: 2020-11-10 | Stop reason: SDUPTHER

## 2020-07-08 ENCOUNTER — TELEPHONE (OUTPATIENT)
Dept: INTERNAL MEDICINE | Facility: CLINIC | Age: 80
End: 2020-07-08

## 2020-07-08 DIAGNOSIS — E55.9 VITAMIN D DEFICIENCY: ICD-10-CM

## 2020-07-08 DIAGNOSIS — E03.9 ACQUIRED HYPOTHYROIDISM: ICD-10-CM

## 2020-07-08 DIAGNOSIS — E78.2 MIXED HYPERLIPIDEMIA: Primary | ICD-10-CM

## 2020-07-08 DIAGNOSIS — R53.83 OTHER FATIGUE: ICD-10-CM

## 2020-07-08 NOTE — TELEPHONE ENCOUNTER
PATIENT SCHEDULED AN APPOINTMENT ON 0722 AND IS REQUESTING LAB ORDERS     PATIENT CALL BACK    [phone]

## 2020-07-22 ENCOUNTER — OFFICE VISIT (OUTPATIENT)
Dept: INTERNAL MEDICINE | Facility: CLINIC | Age: 80
End: 2020-07-22

## 2020-07-22 VITALS
SYSTOLIC BLOOD PRESSURE: 128 MMHG | TEMPERATURE: 98.2 F | HEART RATE: 76 BPM | WEIGHT: 146 LBS | BODY MASS INDEX: 24.92 KG/M2 | DIASTOLIC BLOOD PRESSURE: 78 MMHG | HEIGHT: 64 IN

## 2020-07-22 DIAGNOSIS — Z00.00 MEDICARE ANNUAL WELLNESS VISIT, SUBSEQUENT: Primary | ICD-10-CM

## 2020-07-22 DIAGNOSIS — Z79.899 LONG-TERM USE OF HIGH-RISK MEDICATION: ICD-10-CM

## 2020-07-22 DIAGNOSIS — M47.816 SPONDYLOSIS OF LUMBAR REGION WITHOUT MYELOPATHY OR RADICULOPATHY: ICD-10-CM

## 2020-07-22 PROCEDURE — G0439 PPPS, SUBSEQ VISIT: HCPCS | Performed by: NURSE PRACTITIONER

## 2020-07-22 RX ORDER — GABAPENTIN 100 MG/1
CAPSULE ORAL
Qty: 270 CAPSULE | Refills: 0
Start: 2020-07-22 | End: 2020-12-04

## 2020-07-22 RX ORDER — ATORVASTATIN CALCIUM 10 MG/1
TABLET, FILM COATED ORAL
Qty: 90 TABLET | Refills: 1 | Status: SHIPPED | OUTPATIENT
Start: 2020-07-22 | End: 2021-05-18

## 2020-07-22 NOTE — PATIENT INSTRUCTIONS
Medicare Wellness  Personal Prevention Plan of Service     Date of Office Visit:  2020  Encounter Provider:  DIEGO Hwang  Place of Service:  [facility]  Patient Name: Alanna Rodriges  :  1940    As part of the Medicare Wellness portion of your visit today, we are providing you with this personalized preventive plan of services (PPPS). This plan is based upon recommendations of the [place] Preventive Services Task Force (USPSTF) and the Advisory Committee on Immunization Practices (ACIP).    This lists the preventive care services that should be considered, and provides dates of when you are due. Items listed as completed are up-to-date and do not require any further intervention.    Health Maintenance   Topic Date Due   • LIPID PANEL  2020   • INFLUENZA VACCINE  2020   • MEDICARE ANNUAL WELLNESS  2021   • MAMMOGRAM  2021   • TDAP/TD VACCINES (2 - Td) 2022   • DXA SCAN  2022   • HEPATITIS C SCREENING  Completed   • Pneumococcal Vaccine Once at 65 Years Old  Completed   • ZOSTER VACCINE  Completed   • COLONOSCOPY  Discontinued       Orders Placed This Encounter   Procedures   • Urine Drug Screen - Urine, Clean Catch     Standing Status:   Future     Standing Expiration Date:   2021       Return for Next scheduled follow up.

## 2020-07-22 NOTE — PROGRESS NOTES
QUICK REFERENCE INFORMATION:  The ABCs of the Annual Wellness Visit    Subsequent Medicare Wellness Visit    HEALTH RISK ASSESSMENT    1940    Recent Hospitalizations:  No hospitalization(s) within the last year..        Current Medical Providers:  Patient Care Team:  Ismael Jones MD as PCP - General  Romaine Tanner MD as Consulting Physician (Pain Medicine)  Edmond Mccrary MD as Consulting Physician (Neurosurgery)  Sherice Carrington PA-C as Physician Assistant (Neurosurgery)  Jose Alberto Flores MD as Consulting Physician (Dermatology)  Edmond Hernandez OD (Optometry)        Smoking Status:  Social History     Tobacco Use   Smoking Status Former Smoker   • Packs/day: 0.50   • Years: 30.00   • Pack years: 15.00   • Types: Cigarettes   • Last attempt to quit:    • Years since quittin.5   Smokeless Tobacco Never Used   Tobacco Comment    quit smoking in her 40's        Alcohol Consumption:  Social History     Substance and Sexual Activity   Alcohol Use Yes   • Alcohol/week: 14.0 standard drinks   • Types: 14 Glasses of wine per week   • Frequency: 4 or more times a week   • Drinks per session: 1 or 2       Depression Screen:   PHQ-2/PHQ-9 Depression Screening 2020   Little interest or pleasure in doing things 0   Feeling down, depressed, or hopeless 0   Total Score 0       Health Habits and Functional and Cognitive Screening:  Functional & Cognitive Status 2020   Do you have difficulty preparing food and eating? No   Do you have difficulty bathing yourself, getting dressed or grooming yourself? No   Do you have difficulty using the toilet? No   Do you have difficulty moving around from place to place? No   Do you have trouble with steps or getting out of a bed or a chair? No   Current Diet Well Balanced Diet   Dental Exam Up to date   Eye Exam Up to date   Exercise (times per week) 7 times per week   Current Exercise Activities Include Swimming   Do you need help using the  phone?  No   Are you deaf or do you have serious difficulty hearing?  No   Do you need help with transportation? No   Do you need help shopping? No   Do you need help preparing meals?  No   Do you need help with housework?  No   Do you need help with laundry? No   Do you need help taking your medications? No   Do you need help managing money? No   Do you ever drive or ride in a car without wearing a seat belt? No   Have you felt unusual stress, anger or loneliness in the last month? No   Who do you live with? Spouse   If you need help, do you have trouble finding someone available to you? No   Have you been bothered in the last four weeks by sexual problems? No   Do you have difficulty concentrating, remembering or making decisions? No       Fall Risk Screen:  ALEJANDRO Fall Risk Assessment was completed, and patient is at HIGH risk for falls. Assessment completed on:2020    ACE III MINI   ATTENTION  What is the year: correct  What is the month of the year: correct  What is the day of the week?: correct  What is the date?: correct  MEMORY  Repeat address three times, only score third attempt: Eusebio Moran [address] [place] [address]: 7  HOW MANY ANIMALS DID THE PATIENT NAME  Verbal Fluency -- Animal Names (0-25): 22+  CLOCK DRAWING  Clock Drawing: All Correct  MEMORY RECALL  Tell me what you remember about that name and address we were repeating at the beginnin  ACE TOTAL SCORE  Total ACE Score - <25/30 strongly suggests cognitive impairment; <21/30 almost certainly shows dementia: 30    Does the patient have evidence of cognitive impairment? No    Aspirin use counseling: Taking ASA appropriately as indicated    Recent Lab Results:  CMP:  Lab Results   Component Value Date    BUN 21 2019    CREATININE 0.86 2019    EGFRIFNONA 64 2019    BCR 24.4 2019     2019    K 4.1 2019    CO2 23.1 2019    CALCIUM 9.7 2019    ALBUMIN 4.70 2019    BILITOT  0.6 07/17/2019    ALKPHOS 64 07/17/2019    AST 23 07/17/2019    ALT 14 07/17/2019     HbA1c:  No results found for: HGBA1C  Microalbumin:  No results found for: MICROALBUR, POCMALB, POCCREAT  Lipid Panel  Lab Results   Component Value Date    CHOL 215 (H) 07/17/2019    TRIG 61 07/17/2019     (H) 07/17/2019    LDL 68 07/17/2019    AST 23 07/17/2019    ALT 14 07/17/2019       Visual Acuity:  No exam data present    Age-appropriate Screening Schedule:  Refer to the list below for future screening recommendations based on patient's age, sex and/or medical conditions. Orders for these recommended tests are listed in the plan section. The patient has been provided with a written plan.    Health Maintenance   Topic Date Due   • LIPID PANEL  07/17/2020   • INFLUENZA VACCINE  08/01/2020   • MAMMOGRAM  09/18/2021   • TDAP/TD VACCINES (2 - Td) 09/13/2022   • DXA SCAN  09/18/2022   • ZOSTER VACCINE  Completed   • COLONOSCOPY  Discontinued        Subjective   History of Present Illness    Alanna Rodriges is a 79 y.o. female who presents for a Subsequent Wellness Visit.    CHRONIC CONDITIONS    The following portions of the patient's history were reviewed and updated as appropriate: allergies, current medications, past family history, past medical history, past social history, past surgical history and problem list.    Outpatient Medications Prior to Visit   Medication Sig Dispense Refill   • acyclovir (ZOVIRAX) 400 MG tablet Take 1 tablet by mouth 2 (Two) Times a Day. Take no more than 5 doses a day. 180 tablet 0   • ALPRAZolam (XANAX) 0.5 MG tablet Take 1 tablet by mouth 2 (Two) Times a Day As Needed for Anxiety. 30 tablet 0   • aspirin 81 MG EC tablet Take 81 mg by mouth Daily. Last dose 11/28/2016     • BIOTIN PO Take  by mouth. 2 daily     • cholecalciferol (VITAMIN D3) 1000 units tablet Take 1,000 Units by mouth Daily.     • dicyclomine (BENTYL) 10 MG capsule TAKE 1 CAPSULE BY MOUTH  EVERY NIGHT 30 capsule 5   •  ketoconazole (NIZORAL) 2 % cream Apply 1 application topically Every 12 (Twelve) Hours. Shampoo weekly     • ketoconazole (NIZORAL) 2 % shampoo Apply  topically to the appropriate area as directed 1 (One) Time Per Week. 1 each 3   • levothyroxine (SYNTHROID, LEVOTHROID) 75 MCG tablet TAKE 1 TABLET BY MOUTH  DAILY 90 tablet 1   • Omega-3 Fatty Acids (FISH OIL PO) Take 1 capsule by mouth Daily. Stopped one week prior to surgery     • PREMARIN 0.625 MG tablet TAKE 1 TABLET BY MOUTH  EVERY DAY 90 tablet 1   • RABEprazole (ACIPHEX) 20 MG EC tablet Take 1 tablet by mouth Daily. 90 tablet 1   • atorvastatin (LIPITOR) 10 MG tablet TAKE 1 TABLET BY MOUTH  EVERY DAY (Patient taking differently: Takes it Mon-Wed-Fri) 90 tablet 1   • gabapentin (NEURONTIN) 100 MG capsule TAKE 1 CAPSULE BY MOUTH 3  TIMES DAILY (Patient taking differently: TAKE 1 CAPSULE BY MOUTH 3  TIMES DAILY   Takes it twice a day) 270 capsule 0   • albuterol sulfate  (90 Base) MCG/ACT inhaler Inhale 2 puffs Every 4 (Four) Hours As Needed for Wheezing or Shortness of Air. 1 inhaler 0   • doxycycline (VIBRAMYCIN) 100 MG capsule Take 1 capsule by mouth 2 (Two) Times a Day. 14 capsule 0   • HYDROcod Polst-CPM Polst ER (TUSSIONEX PENNKINETIC ER) 10-8 MG/5ML ER suspension Use 5 ml for severe coughing, no driving on this 115 mL 0   • ondansetron (ZOFRAN) 4 MG tablet Take 1 tablet by mouth Every 8 (Eight) Hours As Needed for Nausea or Vomiting. 10 tablet 0     No facility-administered medications prior to visit.        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 or radiculopathy   • Neuroforaminal stenosis of spine   • Leg length discrepancy   • Ulnar neuropathy at elbow of right upper extremity   • CTS (carpal tunnel syndrome)   • Carpal tunnel  "syndrome of right wrist   • Displacement of intervertebral disc of lumbosacral region   • Anxiety state   • Acute non intractable tension-type headache   • Asthma   • Bilateral hearing loss   • BMI 26.0-26.9,adult   • Depression   • Dysuria   • GERD (gastroesophageal reflux disease)   • Hypothyroidism   • Impacted cerumen, bilateral   • Irritable bowel syndrome   • Medicare annual wellness visit, subsequent   • Mixed hyperlipidemia   • Osteoarthrosis   • Disc disorder of cervical region   • Vitamin D deficiency   • Numbness of fingers       Advance Care Planning:  ACP discussion was held with the patient during this visit. Patient has an advance directive in EMR which is still valid.     Identification of Risk Factors:  Risk factors include: Fall Risk.    Review of Systems   Constitutional: Negative for chills, fatigue and fever.   HENT: Negative for congestion, ear pain and sinus pressure.    Respiratory: Negative for cough, chest tightness, shortness of breath and wheezing.    Cardiovascular: Negative for chest pain and palpitations.   Gastrointestinal: Negative for abdominal pain, blood in stool and constipation.   Skin: Negative for color change.   Allergic/Immunologic: Positive for environmental allergies.   Neurological: Negative for dizziness, speech difficulty and headaches.   Psychiatric/Behavioral: Negative for confusion. The patient is not nervous/anxious.        Compared to one year ago, the patient feels her physical health is the same.  Compared to one year ago, the patient feels her mental health is the same.    Objective     Physical Exam     Procedures     Vitals:    07/22/20 0815 07/22/20 0854   BP: 140/86 128/78   BP Location: Left arm    Patient Position: Sitting    Cuff Size: Adult    Pulse: 76    Temp: 98.2 °F (36.8 °C)    TempSrc: Temporal    Weight: 66.2 kg (146 lb)    Height: 162.6 cm (64.02\")    PainSc: 0-No pain        Patient's Body mass index is 25.05 kg/m². BMI is within normal " parameters. No follow-up required..      Assessment/Plan   Problem List Items Addressed This Visit        Musculoskeletal and Integument    Spondylosis of lumbar region without myelopathy or radiculopathy    Relevant Medications    gabapentin (NEURONTIN) 100 MG capsule       Other    Medicare annual wellness visit, subsequent - Primary      Other Visit Diagnoses     Long-term use of high-risk medication        Relevant Orders    Urine Drug Screen - Urine, Clean Catch        Patient Self-Management and Personalized Health Advice  The patient has been provided with information about: fall prevention and preventive services including:   · Annual Wellness Visit (AWV).    Outpatient Encounter Medications as of 7/22/2020   Medication Sig Dispense Refill   • acyclovir (ZOVIRAX) 400 MG tablet Take 1 tablet by mouth 2 (Two) Times a Day. Take no more than 5 doses a day. 180 tablet 0   • ALPRAZolam (XANAX) 0.5 MG tablet Take 1 tablet by mouth 2 (Two) Times a Day As Needed for Anxiety. 30 tablet 0   • aspirin 81 MG EC tablet Take 81 mg by mouth Daily. Last dose 11/28/2016     • atorvastatin (LIPITOR) 10 MG tablet Takes it Mon-Wed-Fri 90 tablet 1   • BIOTIN PO Take  by mouth. 2 daily     • cholecalciferol (VITAMIN D3) 1000 units tablet Take 1,000 Units by mouth Daily.     • dicyclomine (BENTYL) 10 MG capsule TAKE 1 CAPSULE BY MOUTH  EVERY NIGHT 30 capsule 5   • gabapentin (NEURONTIN) 100 MG capsule TAKE 1 CAPSULE BY MOUTH 2 TIMES DAILY 270 capsule 0   • ketoconazole (NIZORAL) 2 % cream Apply 1 application topically Every 12 (Twelve) Hours. Shampoo weekly     • ketoconazole (NIZORAL) 2 % shampoo Apply  topically to the appropriate area as directed 1 (One) Time Per Week. 1 each 3   • levothyroxine (SYNTHROID, LEVOTHROID) 75 MCG tablet TAKE 1 TABLET BY MOUTH  DAILY 90 tablet 1   • Omega-3 Fatty Acids (FISH OIL PO) Take 1 capsule by mouth Daily. Stopped one week prior to surgery     • PREMARIN 0.625 MG tablet TAKE 1 TABLET BY MOUTH   EVERY DAY 90 tablet 1   • RABEprazole (ACIPHEX) 20 MG EC tablet Take 1 tablet by mouth Daily. 90 tablet 1   • [DISCONTINUED] atorvastatin (LIPITOR) 10 MG tablet TAKE 1 TABLET BY MOUTH  EVERY DAY (Patient taking differently: Takes it Mon-Wed-Fri) 90 tablet 1   • [DISCONTINUED] gabapentin (NEURONTIN) 100 MG capsule TAKE 1 CAPSULE BY MOUTH 3  TIMES DAILY (Patient taking differently: TAKE 1 CAPSULE BY MOUTH 3  TIMES DAILY   Takes it twice a day) 270 capsule 0   • [DISCONTINUED] albuterol sulfate  (90 Base) MCG/ACT inhaler Inhale 2 puffs Every 4 (Four) Hours As Needed for Wheezing or Shortness of Air. 1 inhaler 0   • [DISCONTINUED] doxycycline (VIBRAMYCIN) 100 MG capsule Take 1 capsule by mouth 2 (Two) Times a Day. 14 capsule 0   • [DISCONTINUED] HYDROcod Polst-CPM Polst ER (TUSSIONEX PENNKINETIC ER) 10-8 MG/5ML ER suspension Use 5 ml for severe coughing, no driving on this 115 mL 0   • [DISCONTINUED] ondansetron (ZOFRAN) 4 MG tablet Take 1 tablet by mouth Every 8 (Eight) Hours As Needed for Nausea or Vomiting. 10 tablet 0     No facility-administered encounter medications on file as of 7/22/2020.        Reviewed use of high risk medication in the elderly: yes  Reviewed for potential of harmful drug interactions in the elderly: yes    Follow Up:  No follow-ups on file.     There are no Patient Instructions on file for this visit.    An After Visit Summary and PPPS with all of these plans were given to the patient.

## 2020-07-28 RX ORDER — CONJUGATED ESTROGENS 0.62 MG/1
TABLET, FILM COATED ORAL
Qty: 90 TABLET | Refills: 1 | Status: SHIPPED | OUTPATIENT
Start: 2020-07-28 | End: 2020-12-28

## 2020-07-29 ENCOUNTER — LAB (OUTPATIENT)
Dept: LAB | Facility: HOSPITAL | Age: 80
End: 2020-07-29

## 2020-07-29 DIAGNOSIS — R53.83 OTHER FATIGUE: ICD-10-CM

## 2020-07-29 DIAGNOSIS — E03.9 ACQUIRED HYPOTHYROIDISM: ICD-10-CM

## 2020-07-29 DIAGNOSIS — E55.9 VITAMIN D DEFICIENCY: ICD-10-CM

## 2020-07-29 DIAGNOSIS — E78.2 MIXED HYPERLIPIDEMIA: ICD-10-CM

## 2020-07-29 DIAGNOSIS — Z79.899 LONG-TERM USE OF HIGH-RISK MEDICATION: ICD-10-CM

## 2020-07-29 LAB
AMPHET+METHAMPHET UR QL: NEGATIVE
AMPHETAMINES UR QL: NEGATIVE
BARBITURATES UR QL SCN: NEGATIVE
BENZODIAZ UR QL SCN: NEGATIVE
BUPRENORPHINE SERPL-MCNC: NEGATIVE NG/ML
CANNABINOIDS SERPL QL: NEGATIVE
COCAINE UR QL: NEGATIVE
METHADONE UR QL SCN: NEGATIVE
OPIATES UR QL: NEGATIVE
OXYCODONE UR QL SCN: NEGATIVE
PCP UR QL SCN: NEGATIVE
PROPOXYPH UR QL: NEGATIVE
TRICYCLICS UR QL SCN: NEGATIVE

## 2020-07-29 PROCEDURE — 80306 DRUG TEST PRSMV INSTRMNT: CPT

## 2020-07-31 ENCOUNTER — LAB (OUTPATIENT)
Dept: LAB | Facility: HOSPITAL | Age: 80
End: 2020-07-31

## 2020-07-31 DIAGNOSIS — E78.2 MIXED HYPERLIPIDEMIA: ICD-10-CM

## 2020-07-31 DIAGNOSIS — R53.83 OTHER FATIGUE: ICD-10-CM

## 2020-07-31 DIAGNOSIS — E03.9 ACQUIRED HYPOTHYROIDISM: ICD-10-CM

## 2020-07-31 DIAGNOSIS — E55.9 VITAMIN D DEFICIENCY: ICD-10-CM

## 2020-07-31 DIAGNOSIS — Z79.899 LONG-TERM USE OF HIGH-RISK MEDICATION: ICD-10-CM

## 2020-07-31 LAB
25(OH)D3 SERPL-MCNC: 56.7 NG/ML (ref 30–100)
ALBUMIN SERPL-MCNC: 4.5 G/DL (ref 3.5–5.2)
ALBUMIN/GLOB SERPL: 1.4 G/DL
ALP SERPL-CCNC: 52 U/L (ref 39–117)
ALT SERPL W P-5'-P-CCNC: 12 U/L (ref 1–33)
ANION GAP SERPL CALCULATED.3IONS-SCNC: 11.4 MMOL/L (ref 5–15)
AST SERPL-CCNC: 24 U/L (ref 1–32)
BASOPHILS # BLD AUTO: 0.07 10*3/MM3 (ref 0–0.2)
BASOPHILS NFR BLD AUTO: 1.3 % (ref 0–1.5)
BILIRUB SERPL-MCNC: 0.5 MG/DL (ref 0–1.2)
BUN SERPL-MCNC: 12 MG/DL (ref 8–23)
BUN/CREAT SERPL: 11.5 (ref 7–25)
CALCIUM SPEC-SCNC: 9.3 MG/DL (ref 8.6–10.5)
CHLORIDE SERPL-SCNC: 100 MMOL/L (ref 98–107)
CHOLEST SERPL-MCNC: 202 MG/DL (ref 0–200)
CO2 SERPL-SCNC: 25.6 MMOL/L (ref 22–29)
CREAT SERPL-MCNC: 1.04 MG/DL (ref 0.57–1)
DEPRECATED RDW RBC AUTO: 46 FL (ref 37–54)
EOSINOPHIL # BLD AUTO: 0.2 10*3/MM3 (ref 0–0.4)
EOSINOPHIL NFR BLD AUTO: 3.6 % (ref 0.3–6.2)
ERYTHROCYTE [DISTWIDTH] IN BLOOD BY AUTOMATED COUNT: 12 % (ref 12.3–15.4)
GFR SERPL CREATININE-BSD FRML MDRD: 51 ML/MIN/1.73
GLOBULIN UR ELPH-MCNC: 3.3 GM/DL
GLUCOSE SERPL-MCNC: 103 MG/DL (ref 65–99)
HCT VFR BLD AUTO: 45.5 % (ref 34–46.6)
HDLC SERPL-MCNC: 108 MG/DL (ref 40–60)
HGB BLD-MCNC: 15 G/DL (ref 12–15.9)
IMM GRANULOCYTES # BLD AUTO: 0.01 10*3/MM3 (ref 0–0.05)
IMM GRANULOCYTES NFR BLD AUTO: 0.2 % (ref 0–0.5)
LDLC SERPL CALC-MCNC: 82 MG/DL (ref 0–100)
LDLC/HDLC SERPL: 0.76 {RATIO}
LYMPHOCYTES # BLD AUTO: 1.99 10*3/MM3 (ref 0.7–3.1)
LYMPHOCYTES NFR BLD AUTO: 35.8 % (ref 19.6–45.3)
MCH RBC QN AUTO: 33.9 PG (ref 26.6–33)
MCHC RBC AUTO-ENTMCNC: 33 G/DL (ref 31.5–35.7)
MCV RBC AUTO: 102.7 FL (ref 79–97)
MONOCYTES # BLD AUTO: 0.67 10*3/MM3 (ref 0.1–0.9)
MONOCYTES NFR BLD AUTO: 12.1 % (ref 5–12)
NEUTROPHILS NFR BLD AUTO: 2.62 10*3/MM3 (ref 1.7–7)
NEUTROPHILS NFR BLD AUTO: 47 % (ref 42.7–76)
NRBC BLD AUTO-RTO: 0 /100 WBC (ref 0–0.2)
PLATELET # BLD AUTO: 246 10*3/MM3 (ref 140–450)
PMV BLD AUTO: 11.5 FL (ref 6–12)
POTASSIUM SERPL-SCNC: 4.5 MMOL/L (ref 3.5–5.2)
PROT SERPL-MCNC: 7.8 G/DL (ref 6–8.5)
RBC # BLD AUTO: 4.43 10*6/MM3 (ref 3.77–5.28)
SODIUM SERPL-SCNC: 137 MMOL/L (ref 136–145)
TRIGL SERPL-MCNC: 62 MG/DL (ref 0–150)
TSH SERPL DL<=0.05 MIU/L-ACNC: 7.7 UIU/ML (ref 0.27–4.2)
VLDLC SERPL-MCNC: 12.4 MG/DL (ref 5–40)
WBC # BLD AUTO: 5.56 10*3/MM3 (ref 3.4–10.8)

## 2020-07-31 PROCEDURE — 36415 COLL VENOUS BLD VENIPUNCTURE: CPT

## 2020-07-31 PROCEDURE — 85025 COMPLETE CBC W/AUTO DIFF WBC: CPT

## 2020-07-31 PROCEDURE — 80061 LIPID PANEL: CPT

## 2020-07-31 PROCEDURE — 80053 COMPREHEN METABOLIC PANEL: CPT

## 2020-07-31 PROCEDURE — 82306 VITAMIN D 25 HYDROXY: CPT

## 2020-07-31 PROCEDURE — 84443 ASSAY THYROID STIM HORMONE: CPT

## 2020-08-03 ENCOUNTER — OFFICE VISIT (OUTPATIENT)
Dept: INTERNAL MEDICINE | Facility: CLINIC | Age: 80
End: 2020-08-03

## 2020-08-03 VITALS
HEART RATE: 76 BPM | HEIGHT: 64 IN | TEMPERATURE: 97.3 F | BODY MASS INDEX: 25.23 KG/M2 | WEIGHT: 147.8 LBS | SYSTOLIC BLOOD PRESSURE: 118 MMHG | DIASTOLIC BLOOD PRESSURE: 64 MMHG

## 2020-08-03 DIAGNOSIS — M47.816 SPONDYLOSIS OF LUMBAR REGION WITHOUT MYELOPATHY OR RADICULOPATHY: ICD-10-CM

## 2020-08-03 DIAGNOSIS — F41.1 ANXIETY STATE: ICD-10-CM

## 2020-08-03 DIAGNOSIS — K21.9 GASTROESOPHAGEAL REFLUX DISEASE WITHOUT ESOPHAGITIS: ICD-10-CM

## 2020-08-03 DIAGNOSIS — K58.2 IRRITABLE BOWEL SYNDROME WITH BOTH CONSTIPATION AND DIARRHEA: ICD-10-CM

## 2020-08-03 DIAGNOSIS — E78.2 MIXED HYPERLIPIDEMIA: Primary | ICD-10-CM

## 2020-08-03 DIAGNOSIS — E55.9 VITAMIN D DEFICIENCY: ICD-10-CM

## 2020-08-03 DIAGNOSIS — E03.9 ACQUIRED HYPOTHYROIDISM: ICD-10-CM

## 2020-08-03 PROCEDURE — 99214 OFFICE O/P EST MOD 30 MIN: CPT | Performed by: INTERNAL MEDICINE

## 2020-08-03 RX ORDER — LEVOTHYROXINE SODIUM 88 UG/1
88 TABLET ORAL DAILY
Qty: 90 TABLET | Refills: 1 | Status: SHIPPED | OUTPATIENT
Start: 2020-08-03 | End: 2020-12-01

## 2020-08-03 NOTE — PROGRESS NOTES
[facility]     Alanna Rodriges  1940   [id number]      Patient Care Team:  Ismael Jones MD as PCP - General  Romaine Tanner MD as Consulting Physician (Pain Medicine)  Edmond Mccrary MD as Consulting Physician (Neurosurgery)  Sherice Carrington PA-C as Physician Assistant (Neurosurgery)  Jose Alberto Flores MD as Consulting Physician (Dermatology)  Edmond Hernandez OD (Optometry)    Chief Complaint::   Chief Complaint   Patient presents with   • Hyperlipidemia     AWV  Part 2    • inflamation     parineum is red and inflamed   times a few weeks          HPI  Ms. Rodriges is now 79.  She comes in for follow-up of her hyperlipidemia, GE reflux, IBS, vitamin D deficiency, hypothyroidism and lumbar spondylosis.  They have only recently returned from [place].  On May 8 she fell in her bathroom sustaining fractures to 3 vertebrae.  This was very painful for 6 weeks.  She saw orthopedics and had home health.  This is now resolved.  She continues to experience postural dizziness.  She now anticipates it and has not fallen.  For the last few weeks she has had an inflamed perineal region.  She is status post colectomy for ulcerative colitis and has a Cates's pouch.  She states the area is difficult to dry although she tries to keep it clean.  Lastly she is now being treated for lichen planus on her gums and on her back.    Chronic Conditions:      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 or radiculopathy   • Neuroforaminal stenosis of spine   • Leg length discrepancy   • Ulnar neuropathy at elbow of right upper extremity   • CTS (carpal tunnel syndrome)   • Carpal tunnel syndrome of right wrist   • Displacement of intervertebral disc of lumbosacral region   • Anxiety state   •  Acute non intractable tension-type headache   • Asthma   • Bilateral hearing loss   • BMI 26.0-26.9,adult   • Depression   • Dysuria   • GERD (gastroesophageal reflux disease)   • Hypothyroidism   • Impacted cerumen, bilateral   • Irritable bowel syndrome   • Medicare annual wellness visit, subsequent   • Mixed hyperlipidemia   • Osteoarthrosis   • Disc disorder of cervical region   • Vitamin D deficiency   • Numbness of fingers        Past Medical History:   Diagnosis Date   • Anemia    • Anxiety disorder    • Arthritis    • Cataract    • Depression    • Dermatitis    • DVT (deep venous thrombosis) (CMS/[facility])     1975   • Elbow pain    • GERD (gastroesophageal reflux disease)    • History of blood transfusion    • History of hepatitis C virus infection    • Hypothyroidism    • Kidney stones    • Migraine     occular   • PONV (postoperative nausea and vomiting)    • Ptosis due to aging    • Seborrhea    • Seizure (CMS/[facility]) 1975    during hospitalization as a side effect of Tofranil    • Shoulder pain     right   • Ulcerative colitis (CMS/[facility])    • Ulnar nerve compression, right        Past Surgical History:   Procedure Laterality Date   • BUNIONECTOMY Bilateral    • CARPAL TUNNEL RELEASE Right 1/5/2018    Procedure: CARPAL TUNNEL RELEASE RIGHT ;  Surgeon: Edmond Mccrary MD;  Location:  [facility] OR;  Service:    • CARPAL TUNNEL RELEASE Right 2017   • COLECTOMY TOTAL     • COLON SURGERY  1975    resection   • D&C AND LAPAROSCOPY     • FACELIFT  01/2015    chemical peel   • HYSTERECTOMY  1994    bso   • ILEOSTOMY     • SKIN BIOPSY     • ULNAR NERVE DECOMPRESSION Right 12/9/2016    Procedure: RIGHT ULNAR NERVE DECOMPRESSION;  Surgeon: Edmond Mccrary MD;  Location:  [facility] OR;  Service:    • URETHRAL DILATION      multiple       Family History   Problem Relation Age of Onset   • Alzheimer's disease Other    • Leukemia Other    • Cancer Maternal Grandmother    • Coronary artery disease Maternal Grandfather    • Breast  cancer Neg Hx    • Ovarian cancer Neg Hx        Social History     Socioeconomic History   • Marital status:      Spouse name: Not on file   • Number of children: Not on file   • Years of education: Not on file   • Highest education level: Not on file   Tobacco Use   • Smoking status: Former Smoker     Packs/day: 0.50     Years: 30.00     Pack years: 15.00     Types: Cigarettes     Last attempt to quit:      Years since quittin.6   • Smokeless tobacco: Never Used   • Tobacco comment: quit smoking in her 40's    Substance and Sexual Activity   • Alcohol use: Yes     Alcohol/week: 14.0 standard drinks     Types: 14 Glasses of wine per week     Frequency: 4 or more times a week     Drinks per session: 1 or 2   • Drug use: No   • Sexual activity: Defer       Allergies   Allergen Reactions   • Codeine Nausea Only   • Morphine And Related Hallucinations     And itching After 3 days         Current Outpatient Medications:   •  acyclovir (ZOVIRAX) 400 MG tablet, Take 1 tablet by mouth 2 (Two) Times a Day. Take no more than 5 doses a day., Disp: 180 tablet, Rfl: 0  •  ALPRAZolam (XANAX) 0.5 MG tablet, Take 1 tablet by mouth 2 (Two) Times a Day As Needed for Anxiety., Disp: 30 tablet, Rfl: 0  •  aspirin 81 MG EC tablet, Take 81 mg by mouth Daily. Last dose 2016, Disp: , Rfl:   •  atorvastatin (LIPITOR) 10 MG tablet, Takes it Mon-Wed-Fri, Disp: 90 tablet, Rfl: 1  •  BIOTIN PO, Take  by mouth. 2 daily, Disp: , Rfl:   •  cholecalciferol (VITAMIN D3) 1000 units tablet, Take 1,000 Units by mouth Daily., Disp: , Rfl:   •  dicyclomine (BENTYL) 10 MG capsule, TAKE 1 CAPSULE BY MOUTH  EVERY NIGHT, Disp: 30 capsule, Rfl: 5  •  gabapentin (NEURONTIN) 100 MG capsule, TAKE 1 CAPSULE BY MOUTH 2 TIMES DAILY, Disp: 270 capsule, Rfl: 0  •  ketoconazole (NIZORAL) 2 % cream, Apply 1 application topically Every 12 (Twelve) Hours. Shampoo weekly, Disp: , Rfl:   •  ketoconazole (NIZORAL) 2 % shampoo, Apply  topically to the  "appropriate area as directed 1 (One) Time Per Week., Disp: 1 each, Rfl: 3  •  levothyroxine (SYNTHROID, LEVOTHROID) 88 MCG tablet, Take 1 tablet by mouth Daily., Disp: 90 tablet, Rfl: 1  •  Omega-3 Fatty Acids (FISH OIL PO), Take 1 capsule by mouth Daily. Stopped one week prior to surgery, Disp: , Rfl:   •  PREMARIN 0.625 MG tablet, TAKE 1 TABLET BY MOUTH  EVERY DAY, Disp: 90 tablet, Rfl: 1  •  RABEprazole (ACIPHEX) 20 MG EC tablet, Take 1 tablet by mouth Daily., Disp: 90 tablet, Rfl: 1    Review of Systems   Constitutional: Negative for chills, fatigue and fever.   HENT: Negative for congestion, ear pain and sinus pressure.    Respiratory: Negative for cough, chest tightness, shortness of breath and wheezing.    Cardiovascular: Negative for chest pain and palpitations.   Gastrointestinal: Negative for abdominal pain, blood in stool and constipation.   Skin: Negative for color change.   Allergic/Immunologic: Negative for environmental allergies.   Neurological: Negative for dizziness, speech difficulty and headache.   Psychiatric/Behavioral: Negative for decreased concentration. The patient is not nervous/anxious.         Vital Signs  Vitals:    08/03/20 0823   BP: 118/64   BP Location: Left arm   Patient Position: Sitting   Cuff Size: Adult   Pulse: 76   Temp: 97.3 °F (36.3 °C)   TempSrc: Temporal   Weight: 67 kg (147 lb 12.8 oz)   Height: 162.6 cm (64.02\")   PainSc: 0-No pain       Physical Exam   Constitutional: She is oriented to person, place, and time. She appears well-developed and well-nourished.   HENT:   Head: Normocephalic and atraumatic.   Right Ear: External ear normal.   Left Ear: External ear normal.   Nose: Nose normal.   Mouth/Throat: Oropharynx is clear and moist. No oropharyngeal exudate.   Eyes: Pupils are equal, round, and reactive to light. Conjunctivae and EOM are normal.   Neck: Normal range of motion. Neck supple. No JVD present. No thyromegaly present.   Cardiovascular: Normal rate, regular " rhythm, normal heart sounds and intact distal pulses. Exam reveals no gallop and no friction rub.   No murmur heard.  Pulmonary/Chest: Effort normal and breath sounds normal. No respiratory distress. She has no wheezes. She has no rales.   Abdominal: Soft. Bowel sounds are normal. She exhibits no distension and no mass. There is no tenderness. There is no rebound and no guarding. No hernia.   Musculoskeletal: Normal range of motion. She exhibits no edema or tenderness.   Lymphadenopathy:     She has no cervical adenopathy.   Neurological: She is alert and oriented to person, place, and time. She displays normal reflexes. No cranial nerve deficit or sensory deficit. She exhibits normal muscle tone. Coordination normal.   Skin: Skin is warm and dry. No rash noted. No erythema.   Psychiatric: She has a normal mood and affect. Her behavior is normal. Judgment and thought content normal.   Nursing note and vitals reviewed.     Procedures    ACE III MINI             Assessment/Plan:    Alanna was seen today for hyperlipidemia and inflamation.    Diagnoses and all orders for this visit:    Mixed hyperlipidemia    Gastroesophageal reflux disease without esophagitis    Irritable bowel syndrome with both constipation and diarrhea    Vitamin D deficiency    Acquired hypothyroidism    Spondylosis of lumbar region without myelopathy or radiculopathy    Anxiety state    Other orders  -     levothyroxine (SYNTHROID, LEVOTHROID) 88 MCG tablet; Take 1 tablet by mouth Daily.    Plan    Lipids are very well controlled, continue atorvastatin and healthy diet.    GE reflux is well controlled with AcipHex.    IBS is controlled with diet and dicyclomine as needed.    Vitamin D is controlled, continue with thousand units daily.    TSH is slightly elevated at 7.  She will increase her levothyroxine dosage to 88 mcg a day.  She will have this repeated in 6months by her internist in [place].    Low back pain remains somewhat limiting but she  still tries to stay active.  She will continue low-dose gabapentin.    She uses alprazolam sparingly for her anxiety.      Plan of care reviewed with patient at the conclusion of today's visit. Education was provided regarding diagnosis, management, and any prescribed or recommended OTC medications.Patient verbalizes understanding of and agreement with management plan.         Ismael Jones MD

## 2020-08-06 ENCOUNTER — TRANSCRIBE ORDERS (OUTPATIENT)
Dept: INTERNAL MEDICINE | Facility: CLINIC | Age: 80
End: 2020-08-06

## 2020-08-06 DIAGNOSIS — Z12.31 VISIT FOR SCREENING MAMMOGRAM: Primary | ICD-10-CM

## 2020-09-28 RX ORDER — RABEPRAZOLE SODIUM 20 MG/1
TABLET, DELAYED RELEASE ORAL
Qty: 90 TABLET | Refills: 3 | Status: SHIPPED | OUTPATIENT
Start: 2020-09-28 | End: 2022-08-10 | Stop reason: ALTCHOICE

## 2020-10-03 ENCOUNTER — FLU SHOT (OUTPATIENT)
Dept: INTERNAL MEDICINE | Facility: CLINIC | Age: 80
End: 2020-10-03

## 2020-10-03 DIAGNOSIS — Z23 NEED FOR INFLUENZA VACCINATION: ICD-10-CM

## 2020-10-03 PROCEDURE — 90694 VACC AIIV4 NO PRSRV 0.5ML IM: CPT | Performed by: INTERNAL MEDICINE

## 2020-10-03 PROCEDURE — G0008 ADMIN INFLUENZA VIRUS VAC: HCPCS | Performed by: INTERNAL MEDICINE

## 2020-10-20 ENCOUNTER — TRANSCRIBE ORDERS (OUTPATIENT)
Dept: ADMINISTRATIVE | Facility: HOSPITAL | Age: 80
End: 2020-10-20

## 2020-10-20 DIAGNOSIS — Z12.31 VISIT FOR SCREENING MAMMOGRAM: Primary | ICD-10-CM

## 2020-10-27 ENCOUNTER — HOSPITAL ENCOUNTER (OUTPATIENT)
Dept: MAMMOGRAPHY | Facility: HOSPITAL | Age: 80
Discharge: HOME | End: 2020-10-27
Admitting: INTERNAL MEDICINE

## 2020-10-27 DIAGNOSIS — Z12.31 VISIT FOR SCREENING MAMMOGRAM: ICD-10-CM

## 2020-10-27 PROCEDURE — 77063 BREAST TOMOSYNTHESIS BI: CPT

## 2020-10-27 PROCEDURE — 77067 SCR MAMMO BI INCL CAD: CPT | Performed by: RADIOLOGY

## 2020-10-27 PROCEDURE — 77063 BREAST TOMOSYNTHESIS BI: CPT | Performed by: RADIOLOGY

## 2020-10-27 PROCEDURE — 77067 SCR MAMMO BI INCL CAD: CPT

## 2020-11-11 RX ORDER — ACYCLOVIR 400 MG/1
400 TABLET ORAL 2 TIMES DAILY
Qty: 180 TABLET | Refills: 1 | Status: SHIPPED | OUTPATIENT
Start: 2020-11-11 | End: 2020-11-19 | Stop reason: SDUPTHER

## 2020-11-19 RX ORDER — ACYCLOVIR 400 MG/1
400 TABLET ORAL 2 TIMES DAILY
Qty: 180 TABLET | Refills: 1 | Status: SHIPPED | OUTPATIENT
Start: 2020-11-19 | End: 2021-06-30

## 2020-11-25 ENCOUNTER — APPOINTMENT (OUTPATIENT)
Dept: MAMMOGRAPHY | Facility: HOSPITAL | Age: 80
End: 2020-11-25

## 2020-12-01 RX ORDER — LEVOTHYROXINE SODIUM 88 UG/1
88 TABLET ORAL DAILY
Qty: 90 TABLET | Refills: 3 | Status: SHIPPED | OUTPATIENT
Start: 2020-12-01 | End: 2021-10-21

## 2020-12-04 DIAGNOSIS — M47.816 SPONDYLOSIS OF LUMBAR REGION WITHOUT MYELOPATHY OR RADICULOPATHY: ICD-10-CM

## 2020-12-04 RX ORDER — GABAPENTIN 100 MG/1
CAPSULE ORAL
Qty: 270 CAPSULE | Refills: 3 | Status: SHIPPED | OUTPATIENT
Start: 2020-12-04 | End: 2021-08-04

## 2020-12-28 RX ORDER — CONJUGATED ESTROGENS 0.62 MG/1
TABLET, FILM COATED ORAL
Qty: 90 TABLET | Refills: 3 | Status: SHIPPED | OUTPATIENT
Start: 2020-12-28 | End: 2021-11-29

## 2021-02-12 ENCOUNTER — TELEPHONE (OUTPATIENT)
Dept: INTERNAL MEDICINE | Facility: CLINIC | Age: 81
End: 2021-02-12

## 2021-02-12 NOTE — TELEPHONE ENCOUNTER
Mamm ordered 08/06/20. You reordered 10/20/20 and it was completed. Can I cancel 08/06/20 referral?

## 2021-04-06 RX ORDER — DICYCLOMINE HYDROCHLORIDE 10 MG/1
10 CAPSULE ORAL NIGHTLY
Qty: 90 CAPSULE | Refills: 1 | Status: SHIPPED | OUTPATIENT
Start: 2021-04-06 | End: 2021-10-08

## 2021-04-06 NOTE — TELEPHONE ENCOUNTER
Caller: Alanna Rodriges    Relationship: Self    Best call back number: [phone]    Medication needed:   Requested Prescriptions     Pending Prescriptions Disp Refills   • dicyclomine (BENTYL) 10 MG capsule [Pharmacy Med Name: DICYCLOMINE  10MG  CAP] 90 capsule      Sig: TAKE 1 CAPSULE BY MOUTH  EVERY NIGHT       When do you need the refill by: Saturday 4/10/2021    What additional details did the patient provide when requesting the medication: ENOUGH TO GET THROUGH UNTIL Saturday     Does the patient have less than a 3 day supply:  [] Yes  [x] No    What is the patient's preferred pharmacy: [facility] - [address] [phone] [facility] [phone]

## 2021-05-18 RX ORDER — ATORVASTATIN CALCIUM 10 MG/1
TABLET, FILM COATED ORAL
Qty: 90 TABLET | Refills: 3 | Status: SHIPPED | OUTPATIENT
Start: 2021-05-18 | End: 2022-10-04

## 2021-06-29 ENCOUNTER — TELEPHONE (OUTPATIENT)
Dept: INTERNAL MEDICINE | Facility: CLINIC | Age: 81
End: 2021-06-29

## 2021-06-29 DIAGNOSIS — M25.551 RIGHT HIP PAIN: ICD-10-CM

## 2021-06-29 DIAGNOSIS — M54.50 CHRONIC MIDLINE LOW BACK PAIN WITHOUT SCIATICA: Primary | ICD-10-CM

## 2021-06-29 DIAGNOSIS — G89.29 CHRONIC MIDLINE LOW BACK PAIN WITHOUT SCIATICA: Primary | ICD-10-CM

## 2021-06-29 DIAGNOSIS — G89.29 CHRONIC PAIN OF RIGHT KNEE: ICD-10-CM

## 2021-06-29 DIAGNOSIS — M25.561 CHRONIC PAIN OF RIGHT KNEE: ICD-10-CM

## 2021-06-29 NOTE — TELEPHONE ENCOUNTER
See below, pt states it has been a problem for years but has been getting worse. Has tried ice, heat, advil, nut none os helping. Pt aware she will not get a call until tomorrow.

## 2021-06-29 NOTE — TELEPHONE ENCOUNTER
Caller: Alanna Rodriges    Relationship: Self    Best call back number: [phone]    What orders are you requesting (i.e. lab or imaging): XRAY ON LOWER SPINE, RIGHT HIP AND LEG    In what timeframe would the patient need to come in: ASAP    Where will you receive your lab/imaging services: [facility]    Additional notes:PATIENT STATES THAT SHE CAN HARDLY WALK AND CAN'T APPLY PRESSURE ON RIGHT LEG IT'S  VERY PAINFUL AND SHE IS LIMPING

## 2021-06-30 ENCOUNTER — HOSPITAL ENCOUNTER (OUTPATIENT)
Dept: GENERAL RADIOLOGY | Facility: HOSPITAL | Age: 81
Discharge: HOME | End: 2021-06-30
Admitting: INTERNAL MEDICINE

## 2021-06-30 DIAGNOSIS — M54.50 CHRONIC MIDLINE LOW BACK PAIN WITHOUT SCIATICA: ICD-10-CM

## 2021-06-30 DIAGNOSIS — G89.29 CHRONIC PAIN OF RIGHT KNEE: ICD-10-CM

## 2021-06-30 DIAGNOSIS — M25.551 RIGHT HIP PAIN: ICD-10-CM

## 2021-06-30 DIAGNOSIS — G89.29 CHRONIC MIDLINE LOW BACK PAIN WITHOUT SCIATICA: ICD-10-CM

## 2021-06-30 DIAGNOSIS — M25.561 CHRONIC PAIN OF RIGHT KNEE: ICD-10-CM

## 2021-06-30 PROCEDURE — 73502 X-RAY EXAM HIP UNI 2-3 VIEWS: CPT

## 2021-06-30 PROCEDURE — 72110 X-RAY EXAM L-2 SPINE 4/>VWS: CPT

## 2021-06-30 PROCEDURE — 73560 X-RAY EXAM OF KNEE 1 OR 2: CPT

## 2021-06-30 RX ORDER — ACYCLOVIR 400 MG/1
TABLET ORAL
Qty: 180 TABLET | Refills: 1 | Status: SHIPPED | OUTPATIENT
Start: 2021-06-30 | End: 2021-10-08

## 2021-06-30 NOTE — TELEPHONE ENCOUNTER
I will order xrays of lumbar spine and right hip.  Does the pain extend to the knee?  Does she also hurt in the femur (the bone between hip and knee) or in the calf?

## 2021-06-30 NOTE — TELEPHONE ENCOUNTER
Patient notified.  States her pain extends to about CHCF down her lower leg, anteriorly.  Denies numbness or tingling, just pain. Denies pain in the femur, primarily low back, hip, and then the radiation of the pain down the leg. No known injury.

## 2021-07-02 ENCOUNTER — TELEPHONE (OUTPATIENT)
Dept: INTERNAL MEDICINE | Facility: CLINIC | Age: 81
End: 2021-07-02

## 2021-07-02 NOTE — TELEPHONE ENCOUNTER
Let her know final report not yet available, but preliminary report shows significant arthritis in the lumbar spine, mild arthritis in the hips and knees.  Nothing on these xrays would explain her current symptoms.  I will let her know if the final report differs at all.  If it doesn't, and her pain persists, it might be helpful to come in, let me examine her and see if we need to do an MRI.

## 2021-07-02 NOTE — TELEPHONE ENCOUNTER
Caller: Alanna Rodriges    Relationship: Self    Best call back number: [phone]    Caller requesting test results: SELF    What test was performed: X-RAYS OF LOWER BACK, HIP AND KNEE    When was the test performed: Wednesday 6/30/2021 AFTERNOON     Where was the test performed: [facility]    Additional notes:

## 2021-07-07 ENCOUNTER — TELEPHONE (OUTPATIENT)
Dept: INTERNAL MEDICINE | Facility: CLINIC | Age: 81
End: 2021-07-07

## 2021-07-08 NOTE — TELEPHONE ENCOUNTER
Went over results again with pt. She is on her way to [facility] and will return 7/25. Sched appt for 7/28 with Dr. Jones

## 2021-07-28 ENCOUNTER — OFFICE VISIT (OUTPATIENT)
Dept: INTERNAL MEDICINE | Facility: CLINIC | Age: 81
End: 2021-07-28

## 2021-07-28 VITALS
HEART RATE: 76 BPM | DIASTOLIC BLOOD PRESSURE: 90 MMHG | TEMPERATURE: 97.8 F | BODY MASS INDEX: 25 KG/M2 | WEIGHT: 146.4 LBS | SYSTOLIC BLOOD PRESSURE: 142 MMHG | HEIGHT: 64 IN

## 2021-07-28 DIAGNOSIS — M54.9 RADIATING BACK PAIN: Primary | ICD-10-CM

## 2021-07-28 PROCEDURE — 99213 OFFICE O/P EST LOW 20 MIN: CPT | Performed by: INTERNAL MEDICINE

## 2021-08-03 ENCOUNTER — TELEPHONE (OUTPATIENT)
Dept: INTERNAL MEDICINE | Facility: CLINIC | Age: 81
End: 2021-08-03

## 2021-08-04 ENCOUNTER — OFFICE VISIT (OUTPATIENT)
Dept: INTERNAL MEDICINE | Facility: CLINIC | Age: 81
End: 2021-08-04

## 2021-08-04 VITALS
SYSTOLIC BLOOD PRESSURE: 136 MMHG | BODY MASS INDEX: 24.82 KG/M2 | DIASTOLIC BLOOD PRESSURE: 84 MMHG | TEMPERATURE: 98 F | WEIGHT: 145.4 LBS | HEIGHT: 64 IN | HEART RATE: 100 BPM

## 2021-08-04 DIAGNOSIS — E78.2 MIXED HYPERLIPIDEMIA: ICD-10-CM

## 2021-08-04 DIAGNOSIS — E03.9 ACQUIRED HYPOTHYROIDISM: ICD-10-CM

## 2021-08-04 DIAGNOSIS — K21.9 GASTROESOPHAGEAL REFLUX DISEASE WITHOUT ESOPHAGITIS: ICD-10-CM

## 2021-08-04 DIAGNOSIS — Z11.59 ENCOUNTER FOR HEPATITIS C SCREENING TEST FOR LOW RISK PATIENT: ICD-10-CM

## 2021-08-04 DIAGNOSIS — R53.83 OTHER FATIGUE: ICD-10-CM

## 2021-08-04 DIAGNOSIS — E55.9 VITAMIN D DEFICIENCY: ICD-10-CM

## 2021-08-04 DIAGNOSIS — R73.09 ABNORMAL GLUCOSE: ICD-10-CM

## 2021-08-04 DIAGNOSIS — M47.26 OSTEOARTHRITIS OF SPINE WITH RADICULOPATHY, LUMBAR REGION: ICD-10-CM

## 2021-08-04 DIAGNOSIS — Z00.00 MEDICARE ANNUAL WELLNESS VISIT, SUBSEQUENT: Primary | ICD-10-CM

## 2021-08-04 DIAGNOSIS — F41.1 ANXIETY STATE: ICD-10-CM

## 2021-08-04 PROCEDURE — G0439 PPPS, SUBSEQ VISIT: HCPCS | Performed by: INTERNAL MEDICINE

## 2021-08-04 PROCEDURE — 99397 PER PM REEVAL EST PAT 65+ YR: CPT | Performed by: INTERNAL MEDICINE

## 2021-08-04 PROCEDURE — 1170F FXNL STATUS ASSESSED: CPT | Performed by: INTERNAL MEDICINE

## 2021-08-04 PROCEDURE — 1125F AMNT PAIN NOTED PAIN PRSNT: CPT | Performed by: INTERNAL MEDICINE

## 2021-08-04 RX ORDER — GABAPENTIN 100 MG/1
CAPSULE ORAL
Qty: 270 CAPSULE | Refills: 3
Start: 2021-08-04 | End: 2021-12-08 | Stop reason: SDUPTHER

## 2021-08-04 NOTE — PROGRESS NOTES
QUICK REFERENCE INFORMATION:  The ABCs of the Annual Wellness Visit    Subsequent Medicare Wellness Visit    HEALTH RISK ASSESSMENT    1940    Recent Hospitalizations:  No hospitalization(s) within the last year..        Current Medical Providers:  Patient Care Team:  Ismael Jones MD as PCP - General  Romaine Tanner MD as Consulting Physician (Pain Medicine)  Edmond Mccrary MD (Inactive) as Consulting Physician (Neurosurgery)  Sherice Carrington PA-C as Physician Assistant (Neurosurgery)  Jose Alberto Flores MD as Consulting Physician (Dermatology)  Edmond Hernandez OD (Optometry)        Smoking Status:  Social History     Tobacco Use   Smoking Status Former Smoker   • Packs/day: 0.50   • Years: 30.00   • Pack years: 15.00   • Types: Cigarettes   • Quit date:    • Years since quittin.6   Smokeless Tobacco Never Used   Tobacco Comment    quit smoking in her 40's        Alcohol Consumption:  Social History     Substance and Sexual Activity   Alcohol Use Yes   • Alcohol/week: 14.0 standard drinks   • Types: 14 Glasses of wine per week       Depression Screen:   PHQ-2/PHQ-9 Depression Screening 2021   Little interest or pleasure in doing things 0   Feeling down, depressed, or hopeless 0   Total Score 0       Health Habits and Functional and Cognitive Screening:  Functional & Cognitive Status 2021   Do you have difficulty preparing food and eating? No   Do you have difficulty bathing yourself, getting dressed or grooming yourself? No   Do you have difficulty using the toilet? No   Do you have difficulty moving around from place to place? No   Do you have trouble with steps or getting out of a bed or a chair? Yes   Current Diet Well Balanced Diet   Dental Exam Up to date   Eye Exam Up to date   Exercise (times per week) 5 times per week   Current Exercises Include (No Data)        Exercise Comment stretching, strength, balance   Current Exercise Activities Include -   Do you need help  using the phone?  No   Are you deaf or do you have serious difficulty hearing?  No   Do you need help with transportation? No   Do you need help shopping? No   Do you need help preparing meals?  No   Do you need help with housework?  Yes   Do you need help with laundry? No   Do you need help taking your medications? No   Do you need help managing money? No   Do you ever drive or ride in a car without wearing a seat belt? No   Have you felt unusual stress, anger or loneliness in the last month? No   Who do you live with? Spouse   If you need help, do you have trouble finding someone available to you? Yes   Have you been bothered in the last four weeks by sexual problems? No   Do you have difficulty concentrating, remembering or making decisions? No       Fall Risk Screen:  ALEJANDRO Fall Risk Assessment was completed, and patient is at LOW risk for falls.Assessment completed on:8/4/2021    ACE III MINI        Does the patient have evidence of cognitive impairment? No    Aspirin use counseling: Taking ASA appropriately as indicated    Recent Lab Results:  CMP:  Lab Results   Component Value Date    BUN 12 07/31/2020    CREATININE 1.04 (H) 07/31/2020    EGFRIFNONA 51 (L) 07/31/2020    BCR 11.5 07/31/2020     07/31/2020    K 4.5 07/31/2020    CO2 25.6 07/31/2020    CALCIUM 9.3 07/31/2020    ALBUMIN 4.50 07/31/2020    BILITOT 0.5 07/31/2020    ALKPHOS 52 07/31/2020    AST 24 07/31/2020    ALT 12 07/31/2020     HbA1c:  No results found for: HGBA1C  Microalbumin:  No results found for: MICROALBUR, POCMALB, POCCREAT  Lipid Panel  Lab Results   Component Value Date    CHOL 202 (H) 07/31/2020    TRIG 62 07/31/2020     (H) 07/31/2020    LDL 82 07/31/2020    AST 24 07/31/2020    ALT 12 07/31/2020       Visual Acuity:  No exam data present    Age-appropriate Screening Schedule:  Refer to the list below for future screening recommendations based on patient's age, sex and/or medical conditions. Orders for these  recommended tests are listed in the plan section. The patient has been provided with a written plan.    Health Maintenance   Topic Date Due   • LIPID PANEL  07/31/2021   • DXA SCAN  09/18/2021   • INFLUENZA VACCINE  10/01/2021   • MAMMOGRAM  10/27/2022   • TDAP/TD VACCINES (3 - Td or Tdap) 03/20/2030   • ZOSTER VACCINE  Completed   • COLONOSCOPY  Discontinued        Subjective   History of Present Illness    Alanna Rodriges is a 80 y.o. female who presents for a Subsequent Wellness Visit.    CHRONIC CONDITIONS    The following portions of the patient's history were reviewed and updated as appropriate: allergies, current medications, past family history, past medical history, past social history, past surgical history and problem list.    Outpatient Medications Prior to Visit   Medication Sig Dispense Refill   • acyclovir (ZOVIRAX) 400 MG tablet TAKE 1 TABLET BY MOUTH  TWICE DAILY 180 tablet 1   • ALPRAZolam (XANAX) 0.5 MG tablet Take 1 tablet by mouth 2 (Two) Times a Day As Needed for Anxiety. 30 tablet 0   • aspirin 81 MG EC tablet Take 81 mg by mouth Daily. Last dose 11/28/2016     • atorvastatin (LIPITOR) 10 MG tablet TAKE 1 TABLET BY MOUTH  DAILY (Patient taking differently: Take 10 mg by mouth 3 (Three) Times a Week. Mon, Wed, Fri) 90 tablet 3   • BIOTIN PO Take  by mouth. 2 daily     • cholecalciferol (VITAMIN D3) 1000 units tablet Take 1,000 Units by mouth Daily.     • dicyclomine (BENTYL) 10 MG capsule TAKE 1 CAPSULE BY MOUTH  EVERY NIGHT 90 capsule 1   • ketoconazole (NIZORAL) 2 % cream Apply 1 application topically Every 12 (Twelve) Hours. Shampoo weekly     • ketoconazole (NIZORAL) 2 % shampoo Apply  topically to the appropriate area as directed 1 (One) Time Per Week. 1 each 3   • levothyroxine (SYNTHROID, LEVOTHROID) 88 MCG tablet TAKE 1 TABLET BY MOUTH  DAILY 90 tablet 3   • Omega-3 Fatty Acids (FISH OIL PO) Take 1 capsule by mouth Daily. Stopped one week prior to surgery     • Premarin 0.625 MG  tablet TAKE 1 TABLET BY MOUTH  DAILY 90 tablet 3   • RABEprazole (ACIPHEX) 20 MG EC tablet TAKE 1 TABLET BY MOUTH ONCE DAILY 90 tablet 3   • gabapentin (NEURONTIN) 100 MG capsule TAKE 1 CAPSULE BY MOUTH 3  TIMES DAILY 270 capsule 3     No facility-administered medications prior to visit.       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 or radiculopathy   • Neuroforaminal stenosis of spine   • Leg length discrepancy   • Ulnar neuropathy at elbow of right upper extremity   • CTS (carpal tunnel syndrome)   • Carpal tunnel syndrome of right wrist   • Displacement of intervertebral disc of lumbosacral region   • Anxiety state   • Acute non intractable tension-type headache   • Asthma   • Bilateral hearing loss   • BMI 26.0-26.9,adult   • Depression   • Dysuria   • GERD (gastroesophageal reflux disease)   • Hypothyroidism   • Impacted cerumen, bilateral   • Irritable bowel syndrome   • Medicare annual wellness visit, subsequent   • Mixed hyperlipidemia   • Osteoarthrosis   • Disc disorder of cervical region   • Vitamin D deficiency   • Numbness of fingers       Advance Care Planning:  ACP discussion was held with the patient during this visit. Patient has an advance directive (not in EMR), copy requested.    Identification of Risk Factors:  Risk factors include: Advance Directive Discussion.    Review of Systems   Constitutional: Negative for chills, fatigue and fever.   HENT: Negative for congestion, ear pain and sinus pressure.    Respiratory: Negative for cough, chest tightness, shortness of breath and wheezing.    Cardiovascular: Negative for chest pain and palpitations.   Gastrointestinal: Negative for abdominal pain, blood in stool and constipation.   Musculoskeletal: Positive for arthralgias and gait  problem.   Skin: Negative for color change.   Allergic/Immunologic: Negative for environmental allergies.   Neurological: Negative for dizziness, speech difficulty and headaches.   Psychiatric/Behavioral: Negative for confusion. The patient is not nervous/anxious.        Compared to one year ago, the patient feels her physical health is worse.  Compared to one year ago, the patient feels her mental health is the same.    Objective     Physical Exam  Vitals and nursing note reviewed.   Constitutional:       Appearance: She is well-developed.   HENT:      Head: Normocephalic and atraumatic.      Right Ear: External ear normal.      Left Ear: External ear normal.      Nose: Nose normal.      Mouth/Throat:      Pharynx: No oropharyngeal exudate.   Eyes:      Conjunctiva/sclera: Conjunctivae normal.      Pupils: Pupils are equal, round, and reactive to light.   Neck:      Thyroid: No thyromegaly.      Vascular: No JVD.   Cardiovascular:      Rate and Rhythm: Normal rate and regular rhythm.      Heart sounds: Normal heart sounds. No murmur heard.   No friction rub. No gallop.    Pulmonary:      Effort: Pulmonary effort is normal. No respiratory distress.      Breath sounds: Normal breath sounds. No wheezing or rales.   Chest:      Chest wall: No tenderness.   Abdominal:      General: Bowel sounds are normal. There is no distension.      Palpations: Abdomen is soft. There is no mass.      Tenderness: There is no abdominal tenderness. There is no guarding or rebound.      Hernia: No hernia is present.   Musculoskeletal:         General: No tenderness. Normal range of motion.      Cervical back: Normal range of motion and neck supple.   Lymphadenopathy:      Cervical: No cervical adenopathy.   Skin:     General: Skin is warm and dry.      Findings: No erythema or rash.   Neurological:      Mental Status: She is alert and oriented to person, place, and time.      Cranial Nerves: No cranial nerve deficit.      Sensory: No  "sensory deficit.      Motor: No abnormal muscle tone.      Coordination: Coordination normal.      Deep Tendon Reflexes: Reflexes normal.   Psychiatric:         Behavior: Behavior normal.         Thought Content: Thought content normal.         Judgment: Judgment normal.          Procedures     Vitals:    08/04/21 0839   BP: 136/84   BP Location: Left arm   Patient Position: Sitting   Cuff Size: Adult   Pulse: 100   Temp: 98 °F (36.7 °C)   Weight: 66 kg (145 lb 6.4 oz)   Height: 161.3 cm (63.5\")   PainSc:   5   PainLoc: Hip  Comment: also back       Patient's Body mass index is 25.35 kg/m². indicating that she is within normal range (BMI 18.5-24.9). No BMI management plan needed..      Assessment/Plan   Problem List Items Addressed This Visit        Cardiac and Vasculature    Mixed hyperlipidemia    Relevant Medications    atorvastatin (LIPITOR) 10 MG tablet    Other Relevant Orders    Comprehensive Metabolic Panel    Lipid Panel       Endocrine and Metabolic    Hypothyroidism    Relevant Medications    levothyroxine (SYNTHROID, LEVOTHROID) 88 MCG tablet    Other Relevant Orders    TSH    Vitamin D deficiency    Relevant Orders    Vitamin D 25 Hydroxy       Gastrointestinal Abdominal     GERD (gastroesophageal reflux disease)    Relevant Medications    RABEprazole (ACIPHEX) 20 MG EC tablet    dicyclomine (BENTYL) 10 MG capsule       Health Encounters    Medicare annual wellness visit, subsequent - Primary       Mental Health    Anxiety state       Neuro    Spondylosis of lumbar region without myelopathy or radiculopathy    Relevant Medications    gabapentin (NEURONTIN) 100 MG capsule      Other Visit Diagnoses     Abnormal glucose        Relevant Orders    Hemoglobin A1c    Encounter for hepatitis C screening test for low risk patient        Relevant Orders    Hepatitis C Antibody    Other fatigue        Relevant Orders    CBC & Differential        Patient Self-Management and Personalized Health Advice  The patient " has been provided with information about: diet, exercise and fall prevention and preventive services including:   · Annual Wellness Visit (AWV).    Outpatient Encounter Medications as of 8/4/2021   Medication Sig Dispense Refill   • acyclovir (ZOVIRAX) 400 MG tablet TAKE 1 TABLET BY MOUTH  TWICE DAILY 180 tablet 1   • ALPRAZolam (XANAX) 0.5 MG tablet Take 1 tablet by mouth 2 (Two) Times a Day As Needed for Anxiety. 30 tablet 0   • aspirin 81 MG EC tablet Take 81 mg by mouth Daily. Last dose 11/28/2016     • atorvastatin (LIPITOR) 10 MG tablet TAKE 1 TABLET BY MOUTH  DAILY (Patient taking differently: Take 10 mg by mouth 3 (Three) Times a Week. Mon, Wed, Fri) 90 tablet 3   • BIOTIN PO Take  by mouth. 2 daily     • cholecalciferol (VITAMIN D3) 1000 units tablet Take 1,000 Units by mouth Daily.     • dicyclomine (BENTYL) 10 MG capsule TAKE 1 CAPSULE BY MOUTH  EVERY NIGHT 90 capsule 1   • ketoconazole (NIZORAL) 2 % cream Apply 1 application topically Every 12 (Twelve) Hours. Shampoo weekly     • ketoconazole (NIZORAL) 2 % shampoo Apply  topically to the appropriate area as directed 1 (One) Time Per Week. 1 each 3   • levothyroxine (SYNTHROID, LEVOTHROID) 88 MCG tablet TAKE 1 TABLET BY MOUTH  DAILY 90 tablet 3   • Omega-3 Fatty Acids (FISH OIL PO) Take 1 capsule by mouth Daily. Stopped one week prior to surgery     • Premarin 0.625 MG tablet TAKE 1 TABLET BY MOUTH  DAILY 90 tablet 3   • RABEprazole (ACIPHEX) 20 MG EC tablet TAKE 1 TABLET BY MOUTH ONCE DAILY 90 tablet 3   • gabapentin (NEURONTIN) 100 MG capsule TAKE 1 CAPSULE BY MOUTH 3  TIMES DAILY 270 capsule 3   • [DISCONTINUED] gabapentin (NEURONTIN) 100 MG capsule TAKE 1 CAPSULE BY MOUTH 3  TIMES DAILY 270 capsule 3     No facility-administered encounter medications on file as of 8/4/2021.     Plan    Her most significant problem at present is her back and right lower extremity pain.  This is discussed below.  She is fully vaccinated against COVID-19.  She is  up-to-date on mammography.    She has a known history of lumbar spondylolisthesis and had recent plain films of the hip and knee which ruled out serious disease there.  That strongly suggest current symptoms are radicular in nature.  For the next 6 weeks her therapy will consist of continuing to work with her  twice a week as well as a one-on-one  that she sees once a week.  She will resume gabapentin which she had discontinued and she may push the dose weekly until she has symptom relief or experiences sedation.  she will also continue he denies.  6 weeks if there is no improvement she will contact me and will proceed to MRI.    Lipid panel is pending, continue atorvastatin and healthy diet.    TSH is pending, she is clinically euthyroid, continue levothyroxine.    Vitamin D level is pending, she will continue taking at 1000 units of vitamin D daily.    A1c is pending, the treatment remains healthy diet and avoidance of weight gain.    GERD is controlled with AcipHex.    She controls anxiety with mindfulness techniques.        Reviewed use of high risk medication in the elderly: yes  Reviewed for potential of harmful drug interactions in the elderly: yes    Follow Up:  Return in about 1 year (around 8/4/2022) for Medicare Wellness.     There are no Patient Instructions on file for this visit.    An After Visit Summary and PPPS with all of these plans were given to the patient.

## 2021-08-09 ENCOUNTER — TELEPHONE (OUTPATIENT)
Dept: INTERNAL MEDICINE | Facility: CLINIC | Age: 81
End: 2021-08-09

## 2021-08-13 ENCOUNTER — TELEPHONE (OUTPATIENT)
Dept: INTERNAL MEDICINE | Facility: CLINIC | Age: 81
End: 2021-08-13

## 2021-08-13 NOTE — TELEPHONE ENCOUNTER
It would be good for people with immune def to get booster when available but not indicated to my knowledge yet

## 2021-08-13 NOTE — TELEPHONE ENCOUNTER
Caller: Alanna Rodriges    Relationship: Self    Best call back number: [phone]    What is the best time to reach you: ANYTIME EXCEPT BETWEEN 12 AND 130PM LEAVE VOICE MAIL    Who are you requesting to speak with (clinical staff, provider,  specific staff member): CLINICAL STAFF    Do you know the name of the person who called:     What was the call regarding: INFORMATION REGARDING COVID VACCINE  FOR PATIENTS WITH IMMUNODEFICENCY     Do you require a callback: YES

## 2021-08-13 NOTE — TELEPHONE ENCOUNTER
Pt advised. She said her  saw on the news this am that the booster has been approved for immunocompromised people. Are you aware of this?

## 2021-08-13 NOTE — TELEPHONE ENCOUNTER
Clinical Mgr researched and the FDA has approved booster, but it is now being reviewed by the CDC who will determine whether or not approved. Not available now. Discussed with pt - she voices understanding

## 2021-09-15 ENCOUNTER — TELEPHONE (OUTPATIENT)
Dept: INTERNAL MEDICINE | Facility: CLINIC | Age: 81
End: 2021-09-15

## 2021-09-15 DIAGNOSIS — M43.17 SPONDYLOLISTHESIS OF LUMBOSACRAL REGION: Primary | ICD-10-CM

## 2021-09-15 NOTE — TELEPHONE ENCOUNTER
I need some information before we make referral.  Has she been working with her  twice a week as well as the ?  Did she resume gabapentin?  Is her discomfort any different?

## 2021-09-15 NOTE — TELEPHONE ENCOUNTER
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  She also noted she is still on gabapentin but pain is primarily on right side that radiates down her leg.  Please advise on referral for open MRI

## 2021-09-15 NOTE — TELEPHONE ENCOUNTER
Caller: Alanna Rodriges    Relationship: Self    Best call back number: [phone]      What specialty or service is being requested: MRI OF THE LUMBAR    What is the provider, practice or medical service name: OPEN MRI    What is the office location: [facility]    Any additional details: DR CORRAL TOLD HER TO CALL TODAY TO REMIND HIM TO CALL HER INSURANCE COMPANY ABOUT THE PRIOR AUTHORIZATION FOR THIS MRI.

## 2021-09-21 DIAGNOSIS — M43.17 SPONDYLOLISTHESIS OF LUMBOSACRAL REGION: ICD-10-CM

## 2021-09-27 DIAGNOSIS — M43.17 SPONDYLOLISTHESIS OF LUMBOSACRAL REGION: ICD-10-CM

## 2021-09-27 DIAGNOSIS — K42.9 UMBILICAL HERNIA WITHOUT OBSTRUCTION AND WITHOUT GANGRENE: Primary | ICD-10-CM

## 2021-09-30 ENCOUNTER — OFFICE VISIT (OUTPATIENT)
Dept: INTERNAL MEDICINE | Facility: CLINIC | Age: 81
End: 2021-09-30

## 2021-09-30 VITALS
BODY MASS INDEX: 25.51 KG/M2 | DIASTOLIC BLOOD PRESSURE: 78 MMHG | HEART RATE: 92 BPM | TEMPERATURE: 98.2 F | SYSTOLIC BLOOD PRESSURE: 120 MMHG | HEIGHT: 64 IN | WEIGHT: 149.4 LBS

## 2021-09-30 DIAGNOSIS — M43.17 SPONDYLOLISTHESIS OF LUMBOSACRAL REGION: Primary | ICD-10-CM

## 2021-09-30 PROCEDURE — 99213 OFFICE O/P EST LOW 20 MIN: CPT | Performed by: INTERNAL MEDICINE

## 2021-09-30 PROCEDURE — 90662 IIV NO PRSV INCREASED AG IM: CPT | Performed by: INTERNAL MEDICINE

## 2021-09-30 PROCEDURE — G0008 ADMIN INFLUENZA VIRUS VAC: HCPCS | Performed by: INTERNAL MEDICINE

## 2021-09-30 NOTE — PROGRESS NOTES
"[facility]     Alanna Rodriges  1940   [id number]      Patient Care Team:  Ismael Jones MD as PCP - General  Romaine Tanner MD as Consulting Physician (Pain Medicine)  Edmond Mccrary MD (Inactive) as Consulting Physician (Neurosurgery)  Sherice Carrington PA-C as Physician Assistant (Neurosurgery)  Jose Alberto Flores MD as Consulting Physician (Dermatology)  Edmond Hernandez, MINESH (Optometry)    Chief Complaint::   Chief Complaint   Patient presents with   • Back Pain     F/U from MRI results        HPI  Ms. Rodriges comes in for follow-up of her back pain.  Recently she underwent MRI which showed chronic arthritic changes.  The back pain remains severe.  We have made a referral to pain management she but she now asks if not something that could be done to \"fix the problem.\"  She also has a abnormality and on the right second fingernail and now a small lump at the base of the nail.    Chronic Conditions:      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 or radiculopathy   • Neuroforaminal stenosis of spine   • Leg length discrepancy   • Ulnar neuropathy at elbow of right upper extremity   • CTS (carpal tunnel syndrome)   • Carpal tunnel syndrome of right wrist   • Displacement of intervertebral disc of lumbosacral region   • Anxiety state   • Acute non intractable tension-type headache   • Asthma   • Bilateral hearing loss   • BMI 26.0-26.9,adult   • Depression   • Dysuria   • GERD (gastroesophageal reflux disease)   • Hypothyroidism   • Impacted cerumen, bilateral   • Irritable bowel syndrome   • Medicare annual wellness visit, subsequent   • Mixed hyperlipidemia   • Osteoarthrosis   • Disc disorder of cervical region   • Vitamin D deficiency   • Numbness of fingers "        Past Medical History:   Diagnosis Date   • Anemia    • Anxiety disorder    • Arthritis    • Cataract    • Depression    • Dermatitis    • DVT (deep venous thrombosis) (CMS/HCC)     1975   • Elbow pain    • GERD (gastroesophageal reflux disease)    • History of blood transfusion    • History of hepatitis C virus infection    • Hypothyroidism    • Kidney stones    • Migraine     occular   • PONV (postoperative nausea and vomiting)    • Ptosis due to aging    • Seborrhea    • Seizure (CMS/HCC) 1975    during hospitalization as a side effect of Tofranil    • Shoulder pain     right   • Ulcerative colitis (CMS/HCC)    • Ulnar nerve compression, right        Past Surgical History:   Procedure Laterality Date   • BUNIONECTOMY Bilateral    • CARPAL TUNNEL RELEASE Right 1/5/2018    Procedure: CARPAL TUNNEL RELEASE RIGHT ;  Surgeon: Edmond Mccrary MD;  Location:  [facility];  Service:    • CARPAL TUNNEL RELEASE Right 2017   • CATARACT EXTRACTION, BILATERAL Bilateral 12/2020    in [place]   • COLECTOMY TOTAL     • COLON SURGERY  1975    resection   • D & C AND LAPAROSCOPY     • FACELIFT  01/2015    chemical peel   • HYSTERECTOMY  1994    bso   • ILEOSTOMY     • OOPHORECTOMY  1994   • SKIN BIOPSY     • ULNAR NERVE DECOMPRESSION Right 12/9/2016    Procedure: RIGHT ULNAR NERVE DECOMPRESSION;  Surgeon: Edmond Mccrary MD;  Location:  [facility];  Service:    • URETHRAL DILATION      multiple       Family History   Problem Relation Age of Onset   • Alzheimer's disease Other    • Leukemia Other    • Cancer Maternal Grandmother    • Coronary artery disease Maternal Grandfather    • Breast cancer Neg Hx    • Ovarian cancer Neg Hx        Social History     Socioeconomic History   • Marital status:      Spouse name: Not on file   • Number of children: Not on file   • Years of education: Not on file   • Highest education level: Not on file   Tobacco Use   • Smoking status: Former Smoker     Packs/day: 0.50     Years: 30.00      Pack years: 15.00     Types: Cigarettes     Quit date:      Years since quittin.7   • Smokeless tobacco: Never Used   • Tobacco comment: quit smoking in her 40's    Substance and Sexual Activity   • Alcohol use: Yes     Alcohol/week: 14.0 standard drinks     Types: 14 Glasses of wine per week   • Drug use: No   • Sexual activity: Defer       Allergies   Allergen Reactions   • Codeine Nausea Only   • Morphine And Related Hallucinations     And itching After 3 days         Current Outpatient Medications:   •  acyclovir (ZOVIRAX) 400 MG tablet, TAKE 1 TABLET BY MOUTH  TWICE DAILY, Disp: 180 tablet, Rfl: 1  •  ALPRAZolam (XANAX) 0.5 MG tablet, Take 1 tablet by mouth 2 (Two) Times a Day As Needed for Anxiety., Disp: 30 tablet, Rfl: 0  •  aspirin 81 MG EC tablet, Take 81 mg by mouth Daily. Last dose 2016, Disp: , Rfl:   •  atorvastatin (LIPITOR) 10 MG tablet, TAKE 1 TABLET BY MOUTH  DAILY (Patient taking differently: Take 10 mg by mouth 3 (Three) Times a Week. Mon, Wed, Fri), Disp: 90 tablet, Rfl: 3  •  BIOTIN PO, Take 1 tablet by mouth Daily. 2 daily , Disp: , Rfl:   •  Boswellia-Glucosamine-Vit D (OSTEO BI-FLEX ONE PER DAY PO), Take 1 tablet by mouth Daily., Disp: , Rfl:   •  cholecalciferol (VITAMIN D3) 1000 units tablet, Take 1,000 Units by mouth Daily., Disp: , Rfl:   •  dicyclomine (BENTYL) 10 MG capsule, TAKE 1 CAPSULE BY MOUTH  EVERY NIGHT, Disp: 90 capsule, Rfl: 1  •  gabapentin (NEURONTIN) 100 MG capsule, TAKE 1 CAPSULE BY MOUTH 3  TIMES DAILY, Disp: 270 capsule, Rfl: 3  •  ketoconazole (NIZORAL) 2 % cream, Apply 1 application topically Every 12 (Twelve) Hours. Shampoo weekly, Disp: , Rfl:   •  ketoconazole (NIZORAL) 2 % shampoo, Apply  topically to the appropriate area as directed 1 (One) Time Per Week., Disp: 1 each, Rfl: 3  •  levothyroxine (SYNTHROID, LEVOTHROID) 88 MCG tablet, TAKE 1 TABLET BY MOUTH  DAILY, Disp: 90 tablet, Rfl: 3  •  Omega-3 Fatty Acids (FISH OIL PO), Take 1 capsule by  "mouth Daily. Stopped one week prior to surgery, Disp: , Rfl:   •  Premarin 0.625 MG tablet, TAKE 1 TABLET BY MOUTH  DAILY, Disp: 90 tablet, Rfl: 3  •  Probiotic Product (VISBIOME PROBIOTIC HIGH POT PO), Take 1 tablet by mouth Daily., Disp: , Rfl:   •  RABEprazole (ACIPHEX) 20 MG EC tablet, TAKE 1 TABLET BY MOUTH ONCE DAILY, Disp: 90 tablet, Rfl: 3    Review of Systems   Constitutional: Negative.    Respiratory: Negative.  Negative for chest tightness and shortness of breath.    Cardiovascular: Negative.  Negative for chest pain.   Gastrointestinal: Negative for abdominal pain, blood in stool, constipation and diarrhea.   Musculoskeletal: Positive for back pain.        Vital Signs  Vitals:    09/30/21 1049   BP: 120/78   BP Location: Right arm   Patient Position: Sitting   Cuff Size: Adult   Pulse: 92   Temp: 98.2 °F (36.8 °C)   Weight: 67.8 kg (149 lb 6.4 oz)   Height: 161.3 cm (63.5\")   PainSc:   8   PainLoc: Back  Comment: leg       Physical Exam  Vitals reviewed.   Constitutional:       Appearance: She is well-developed.   HENT:      Head: Normocephalic and atraumatic.   Cardiovascular:      Rate and Rhythm: Normal rate and regular rhythm.      Heart sounds: Normal heart sounds. No murmur heard.     Pulmonary:      Effort: Pulmonary effort is normal.      Breath sounds: Normal breath sounds.   Neurological:      Mental Status: She is alert and oriented to person, place, and time.          Procedures    ACE III MINI             Assessment/Plan:    Diagnoses and all orders for this visit:    1. Spondylolisthesis of lumbosacral region, L5-S1 (Primary)  -     Ambulatory Referral to Neurosurgery    Other orders  -     Fluzone High-Dose 65+yrs    Plan    Although lightheaded advised her that I did not see anything that can be surgically corrected on her MRI, she wants to go ahead and have a second opinion with neurosurgery.  The point with pain management is in process.    She has what appears to be a small ganglion " cyst at the base of right second nail and the nail itself is split.  It is not uncomfortable and does not necessarily require treatment.    She has an appointment to see general surgery regarding umbilical hernia.      Plan of care reviewed with patient at the conclusion of today's visit. Education was provided regarding diagnosis, management, and any prescribed or recommended OTC medications.Patient verbalizes understanding of and agreement with management plan.         Ismael Jones MD

## 2021-10-04 ENCOUNTER — TELEPHONE (OUTPATIENT)
Dept: INTERNAL MEDICINE | Facility: CLINIC | Age: 81
End: 2021-10-04

## 2021-10-04 DIAGNOSIS — N20.0 KIDNEY STONE: Primary | ICD-10-CM

## 2021-10-04 NOTE — TELEPHONE ENCOUNTER
Please let herknow radiology called that they saw what is probably a kidney stone in the right ureter on her MRI that they didn't initially see.  Because a kidney stone might contribute to her pain, we need to look into it.  The test to evaluate that is a CAT scan with and without IV contrast. I have ordered it.

## 2021-10-04 NOTE — TELEPHONE ENCOUNTER
Discussed with pt - she wants to make sure CT scan is open - she is claustrophobic. She also said she thought the stone had been there for a while

## 2021-10-04 NOTE — TELEPHONE ENCOUNTER
CAT scans are all more open than MRI's, so that should not be a problem.  The stone looked a little bigger than one seen on a previous MRI, but a CAT scan is a better way to look at a stone. Since she already knows she had a stone there, I couldn't find any old CAT sscans on our chart, I suppose it's not as important, but since it looks larger, we should probably look at it.

## 2021-10-08 RX ORDER — DICYCLOMINE HYDROCHLORIDE 10 MG/1
10 CAPSULE ORAL NIGHTLY
Qty: 90 CAPSULE | Refills: 1 | Status: SHIPPED | OUTPATIENT
Start: 2021-10-08

## 2021-10-08 RX ORDER — ACYCLOVIR 400 MG/1
TABLET ORAL
Qty: 180 TABLET | Refills: 1 | Status: SHIPPED | OUTPATIENT
Start: 2021-10-08

## 2021-10-11 ENCOUNTER — TELEPHONE (OUTPATIENT)
Dept: INTERNAL MEDICINE | Facility: CLINIC | Age: 81
End: 2021-10-11

## 2021-10-11 NOTE — TELEPHONE ENCOUNTER
Pt is scheduled for a CT scan on October 27th at 4:00pm. Pt wants to know if this can be scheduled sooner somewhere else.

## 2021-10-14 ENCOUNTER — TRANSCRIBE ORDERS (OUTPATIENT)
Dept: ADMINISTRATIVE | Facility: HOSPITAL | Age: 81
End: 2021-10-14

## 2021-10-14 DIAGNOSIS — Z12.31 VISIT FOR SCREENING MAMMOGRAM: Primary | ICD-10-CM

## 2021-10-16 ENCOUNTER — HOSPITAL ENCOUNTER (OUTPATIENT)
Dept: CT IMAGING | Facility: HOSPITAL | Age: 81
Discharge: HOME | End: 2021-10-16
Admitting: INTERNAL MEDICINE

## 2021-10-16 DIAGNOSIS — N20.0 KIDNEY STONE: ICD-10-CM

## 2021-10-16 PROCEDURE — 74178 CT ABD&PLV WO CNTR FLWD CNTR: CPT

## 2021-10-16 PROCEDURE — 82565 ASSAY OF CREATININE: CPT

## 2021-10-16 PROCEDURE — 25010000002 IOPAMIDOL 61 % SOLUTION: Performed by: INTERNAL MEDICINE

## 2021-10-16 RX ADMIN — IOPAMIDOL 80 ML: 612 INJECTION, SOLUTION INTRAVENOUS at 09:19

## 2021-10-18 ENCOUNTER — TELEPHONE (OUTPATIENT)
Dept: INTERNAL MEDICINE | Facility: CLINIC | Age: 81
End: 2021-10-18

## 2021-10-18 NOTE — TELEPHONE ENCOUNTER
PATIENT RETURNED PHONE CALL AND RESULTS WERE GIVEN TO PATIENT PER DR. CORRAL'S INSTRUCTIONS REGARDING CAT SCAN.    PATIENT VERBALIZED WITH UNDERSTANDING.

## 2021-10-18 NOTE — TELEPHONE ENCOUNTER
Caller: Alanna Rodriges    Relationship: Self    Best call back number: [phone]  Caller requesting test results: YES    What test was performed: CT SCAN    When was the test performed:10/16    Where was the test performed: [facility]    Additional notes:

## 2021-10-18 NOTE — TELEPHONE ENCOUNTER
CAT scan showed her old kidney stone.  It doesn't seem to be going anywhere.  I don't think we need to pursue further.

## 2021-10-21 RX ORDER — LEVOTHYROXINE SODIUM 88 UG/1
88 TABLET ORAL DAILY
Qty: 90 TABLET | Refills: 3 | Status: SHIPPED | OUTPATIENT
Start: 2021-10-21 | End: 2022-09-06

## 2021-10-25 LAB — CREAT BLDA-MCNC: 1 MG/DL (ref 0.6–1.3)

## 2021-10-26 NOTE — PROGRESS NOTES
"Chief Complaint: \"Pain in my right lower back, right hip, and right leg.\"    History of present illness: Mrs. Alanna Rodriges is a 80 y.o. female, is a longstanding patient who returns to the clinic for evaluation of back and lower extremity pain.  She was last seen on July 23, 2019 for follow-up evaluation.  On July 1, 2019, she underwent right L4-L5 and right L5-S1 transforaminal epidural steroid injection, from which at the time of her follow-up visit she was reporting 95% pain relief that was ongoing.  She tells me, her pain recurred soon thereafter.  She has been engaged in a supervised exercise program with a , she is active in takokat, and she is currently taking gabapentin provided by her PCP, and without significant benefit.  She has a new MRI of the lumbar spine without contrast for review.  She denies any significant changes in her medical history since she was last seen.  Pain history: Patient has a 14-year history of lower back pain, which began without incident.   Pain Description: constant pain with intermittent exacerbation, described as dull, aching, burning and throbbing sensation.   Radiation of Pain: The pain radiates into the posterior aspect of the hip, lateral aspect of the thigh and dorsal aspect of the foot.  Pain intensity today: 7/10  Average pain intensity last week: 7/10  Pain intensity ranges from: 5/10 to 9/10  Aggravating factors: Pain increases with standing, walking. Patient describes neurogenic claudication.   Alleviating factors:  Pain decreases with reclining.   Associated Symptoms:   Patient reports pain and weakness in the right lower extremity.  She denies numbness.  Patient denies any new bladder or bowel problems.   Patient reports difficulties with her balance but denies recent falls.     Review of previous therapies and additional medical records:  Alanna Rodriges has already failed the following measures, including:   Conservative measures: " oral analgesics, opioids, topical analgesics, massage, physical therapy, ice, heat and supervised exercise program, chiropractor.   Interventional measures: 7/23/2019: Right L4-L5 and right L5-S1 transforaminal epidural steroid injection  02/21/2018: DxTx right L5-S1 transforaminal epidural steroid injection  6/20/2018: DxTx right L4-L5 and right L5-S1 transforaminal epidural steroid injection  9/19/2016: right sacroiliac joint injection with steroids combined with right greater trochanteric bursa injection   01/20/2016: bilateral lumbar medial ranch rhizotomies   Surgical measures: No previous lumbar spine or hip surgery. Right ulnar nerve decompression with Dr. Mccrary on December 9, 2016.  Right median nerve decompression at the wrist with Dr. Mccrary on 01/05/2018, with excellent analgesic outcome.  Alanna Rodriges underwent surgical  consultation previously with Dr. Villalta, who found her not to be a surgical candidate.  She has an upcoming follow-up consultation with neurosurgery tomorrow.  In terms of analgesics she takes: Gabapentin.  I have reviewed her Rogerio Report #[id number] consistent with medication reconciliation.      Global Pain Scale 10-27  2021          Pain 17          Feelings 17          Clinical outcomes 16          Activities 17          GPS Total: 67            Review of New Diagnostic Studies:   MRI of the lumbar spine without contrast 9/20/2021: Per radiology report.  There is leftward convexity of the thoracolumbar spine curvature, with moderate sarcopenia of the paraspinal muscles.  The conus ends at L1-L2.  There are Tarlov cysts at the S2-S3 neural foramina.  L1-L2: Small diffuse disc bulge, without spinal canal or neural foraminal stenosis.  L2-L3: Small diffuse disc bulge with a small left foraminal disc protrusion, mild facet arthritis, without spinal canal or neural foraminal stenosis.  L3-4: Small diffuse disc bulge with small left foraminal disc protrusion, mild facet  "arthritis, without spinal canal or neural foraminal stenosis.  L4-L5: Diffuse disc bulge with a small left paracentral disc protrusion, mild facet arthritis and ligamentum flavum hypertrophy.  Mild progressive spinal canal and left foraminal stenosis.  L5-S1: Disc space narrowing with possible edema, and degenerative endplate changes, this is similar to the prior study.  There is mild facet arthritis, without significant spinal canal stenosis and mild bilateral neuroforaminal stenosis.  Right hip x-ray 7/1/2021: I have reviewed the imaging.  Mild to moderate osteoarthritis seen within the right hip, there is moderate arthritis seen within the left hip.  Degenerative changes seen within the sacroiliac joints.  Lumbar spine x-ray with complete views 7/1/2021: I reviewed the imaging.  Curvature with convexity to the left.  There is mild anterior spurring seen throughout the lumbar spine with space narrowing most significant L5-S1.  Multilevel degenerative changes in the lumbar spine.    Review of Systems   Constitutional: Positive for activity change and fatigue.   HENT: Positive for postnasal drip and rhinorrhea.    Eyes: Positive for redness.   Respiratory: Positive for shortness of breath.    Gastrointestinal: Positive for abdominal pain and nausea.   Musculoskeletal: Positive for arthralgias, back pain, gait problem and neck pain.   Allergic/Immunologic: Positive for environmental allergies.   Neurological: Positive for dizziness, weakness and numbness.   Psychiatric/Behavioral: The patient is nervous/anxious.    All other systems reviewed and are negative.     The following portions of the patient's history were reviewed and updated as appropriate: problem list, past medical history, past surgery history, social history, family history, medications, and allergies     /85 (BP Location: Left arm, Patient Position: Sitting, Cuff Size: Adult)   Pulse 83   Temp 95.7 °F (35.4 °C)   Ht 162.6 cm (64\")   Wt 67.8 " kg (149 lb 6.4 oz)   SpO2 97%   BMI 25.64 kg/m²      Physical Exam   Neurologic Exam   Constitutional: Patient appears, well-developed, well-nourished, well-hydrated    HEENT: Head: Normocephalic and atraumatic  Eyes: Conjunctivae and lids are normal  Pupils: Equal, round, reactive to light  Neck: Trachea normal. Neck supple. No JVD present.   Pulmonary: No increased work of breathing or signs of respiratory distress.   Musculoskeletal   Gait and station: Gait evaluation demonstrated a normal gait    Lumbar spine: Passive and active range of motion are appropriate for her age with some pain. Extension, flexion, and rotation of the lumbar spine increased and reproduced pain. Lumbar facet joint loading maneuvers are positive.  Lenny test and Gaenslen's test are negative.   Piriformis maneuvers are negative.    Palpation of the bilateral ischial tuberosities, unrevealing.    Palpation of the bilateral greater trochanter, reveals mild tenderness on the right.    Examination of the Iliotibial band: unrevealing.    Hip joints: The range of motion of the hip joints is limited to flexion and internal rotation on the bilaterally secondary to pain RT>LT  Neurological:   Patient is alert and oriented to person, place, and time.   Speech: Normal.   Cortical function: Normal mental status.   Cranial nerves 2-12: intact.   Reflex Scores:  Right patellar: 1+  Left patellar: 1+  Right Achilles: 1+  Left Achilles: 1+  Motor strength: 5/5  Motor Tone: Normal .   Involuntary movements: None.   Superficial/Primitive Reflexes: Primitive reflexes were absent.   Right Sweeney: Absent  Left Sweeney: Absent  Right ankle clonus: Absent  Left ankle clonus: Absent   Negative. Long tract signs: Negative. Straight leg raising test: On the right elicits her right gluteal pain.  Femoral stretch sign: Negative.   Sensory exam: Intact to light touch, intact pain and temperature sensation, intact vibration sensation and normal proprioception.    Coordination: Normal finger to nose and heel to shin. Normal balance and negative Romberg's sign   Skin and subcutaneous tissue: Skin is warm and intact. No rash noted. No cyanosis.   Psychiatric: Judgment and insight: Normal. Recent and remote memory: Intact. Mood and affect: Normal.           ASSESSMENT:   1. Spinal stenosis of lumbar region with neurogenic claudication    2. DDD (degenerative disc disease), lumbar    3. Spondylolisthesis of lumbosacral region, L5-S1    4. Spondylosis of lumbar region without myelopathy or radiculopathy    5. Neuroforaminal stenosis of spine    6. Leg length discrepancy    7. Primary osteoarthritis of right hip       PLAN/MEDICAL DECISION MAKING: Patient presents with longstanding history of lower back and right lower extremity pain.  She also has a history significant for mechanical lower back pain.  She is previously underwent lumbar medial branch rhizotomies with significant reduction in her overall mechanical pain.  Recent MRI of the lumbar spine revealed multilevel generative changes, facet arthritis, at L4-L5 there is facet arthritis, ligamentum flavum thickening, and mild spinal canal stenosis, at L5-S1 there is Disc space narrowing with possible edema, and degenerative endplate changes, this is similar to the prior study.  There is mild facet arthritis, without significant spinal canal stenosis and mild bilateral neuroforaminal stenosis.  Additionally, she has bilateral hip osteoarthritis.  She appears to be suffering from separate sources of pain, one from her hips, and one from her lumbar spinal stenosis.  She has responded significantly well to previous minimally invasive interventional procedures, as referenced above.  Although, she tells me with injections her relief is short-lived.  I have discussed with her several different options, in treating her pain, she does have a consultation with neurosurgery tomorrow, which from which she would like to undergo first.  I  have reviewed all available patient's medical records as well as previous therapies as referenced above under history of present illness.  Therefore, I have proposed the following plan:   1.  Interventional pain management measures: I have discussed with her the possibility of repeating L4-L5 and right L5-S1 transforaminal epidural steroid injection.  We may repeat epidural depending on patient's outcome.  Other options, I have discussed include diagnostics right hip joint injection versus diagnostic and therapeutic right hip joint injection.  She has a neurosurgical consultation scheduled for 10/28/2021.  If she is found not to be a surgical candidate, and she fails minimally invasive interventional measures, she could be an appropriate candidate for a spinal cord stimulator.    2. Pharmacological measures: Reviewed and discussed.  A. Patient takes Advil PRN and gabapentin.  Patient also takes Xanax.  3. Long-term rehabilitation efforts:  A. Patient will start a comprehensive physical therapy program for reconditioning, water therapy, therapeutic exercise, core strengthening, gait and balance training, neurodynamics, ultrasound, ASTYM, E-STIM, myofascial release, and dry needling   B. Continue Pilates   C. Continue low impact exercise program with   D. Continue utilizing 0.5 cm right shoe lift  4. The patient has been instructed to contact my office with any questions or difficulties. The patient understands the plan and agrees to proceed accordingly.     Patient Care Team:  Ismael Jones MD as PCP - General  Romaine Tanner MD as Consulting Physician (Pain Medicine)  Edmond Mccrary MD (Inactive) as Consulting Physician (Neurosurgery)  Sherice Carrington PA-C as Physician Assistant (Neurosurgery)  Jose Alberto Flores MD as Consulting Physician (Dermatology)  Edmond Hernandez OD (Optometry)     No orders of the defined types were placed in this encounter.        Future Appointments    Date Time Provider Department Center   10/28/2021  1:30 PM Sherice Carrington PA-C [facility]   10/30/2021  1:50 PM [facility] [place]   8/10/2022  8:30 AM Ismael Jones MD [facility]         DIEGO Mckeon/Transcription disclaimer:  Much of this encounter note is an electronic transcription of spoken language to printed text. Electronic transcription of spoken language may permit erroneous, or at times, nonsensical words or phrases to be inadvertently transcribed. Although I have reviewed the note for such errors, some may still exist.

## 2021-10-27 ENCOUNTER — OFFICE VISIT (OUTPATIENT)
Dept: PAIN MEDICINE | Facility: CLINIC | Age: 81
End: 2021-10-27

## 2021-10-27 ENCOUNTER — APPOINTMENT (OUTPATIENT)
Dept: CT IMAGING | Facility: HOSPITAL | Age: 81
End: 2021-10-27

## 2021-10-27 VITALS
HEIGHT: 64 IN | WEIGHT: 149.4 LBS | BODY MASS INDEX: 25.51 KG/M2 | TEMPERATURE: 95.7 F | HEART RATE: 83 BPM | DIASTOLIC BLOOD PRESSURE: 85 MMHG | OXYGEN SATURATION: 97 % | SYSTOLIC BLOOD PRESSURE: 148 MMHG

## 2021-10-27 DIAGNOSIS — M21.70 LEG LENGTH DISCREPANCY: ICD-10-CM

## 2021-10-27 DIAGNOSIS — M16.11 PRIMARY OSTEOARTHRITIS OF RIGHT HIP: ICD-10-CM

## 2021-10-27 DIAGNOSIS — M51.36 DDD (DEGENERATIVE DISC DISEASE), LUMBAR: ICD-10-CM

## 2021-10-27 DIAGNOSIS — M48.00 NEUROFORAMINAL STENOSIS OF SPINE: ICD-10-CM

## 2021-10-27 DIAGNOSIS — M48.062 SPINAL STENOSIS OF LUMBAR REGION WITH NEUROGENIC CLAUDICATION: ICD-10-CM

## 2021-10-27 DIAGNOSIS — M47.816 SPONDYLOSIS OF LUMBAR REGION WITHOUT MYELOPATHY OR RADICULOPATHY: ICD-10-CM

## 2021-10-27 DIAGNOSIS — M43.17 SPONDYLOLISTHESIS OF LUMBOSACRAL REGION: ICD-10-CM

## 2021-10-27 PROCEDURE — 99213 OFFICE O/P EST LOW 20 MIN: CPT | Performed by: NURSE PRACTITIONER

## 2021-10-27 RX ORDER — DOXYCYCLINE HYCLATE 100 MG/1
CAPSULE ORAL
COMMUNITY
End: 2022-08-10

## 2021-10-27 RX ORDER — CLOBETASOL PROPIONATE 0.5 MG/G
CREAM TOPICAL
COMMUNITY

## 2021-10-28 ENCOUNTER — OFFICE VISIT (OUTPATIENT)
Dept: NEUROSURGERY | Facility: CLINIC | Age: 81
End: 2021-10-28

## 2021-10-28 VITALS
TEMPERATURE: 96.8 F | DIASTOLIC BLOOD PRESSURE: 80 MMHG | WEIGHT: 147.6 LBS | SYSTOLIC BLOOD PRESSURE: 140 MMHG | HEIGHT: 64 IN | BODY MASS INDEX: 25.2 KG/M2

## 2021-10-28 DIAGNOSIS — M47.816 SPONDYLOSIS OF LUMBAR REGION WITHOUT MYELOPATHY OR RADICULOPATHY: Primary | ICD-10-CM

## 2021-10-28 DIAGNOSIS — M16.11 PRIMARY OSTEOARTHRITIS OF RIGHT HIP: ICD-10-CM

## 2021-10-28 DIAGNOSIS — M51.36 DDD (DEGENERATIVE DISC DISEASE), LUMBAR: ICD-10-CM

## 2021-10-28 DIAGNOSIS — M21.70 LEG LENGTH DISCREPANCY: ICD-10-CM

## 2021-10-28 DIAGNOSIS — M51.27 DISPLACEMENT OF INTERVERTEBRAL DISC OF LUMBOSACRAL REGION: ICD-10-CM

## 2021-10-28 DIAGNOSIS — M43.17 SPONDYLOLISTHESIS OF LUMBOSACRAL REGION: ICD-10-CM

## 2021-10-28 DIAGNOSIS — M50.90 DISC DISORDER OF CERVICAL REGION: ICD-10-CM

## 2021-10-28 PROCEDURE — 99214 OFFICE O/P EST MOD 30 MIN: CPT | Performed by: PHYSICIAN ASSISTANT

## 2021-10-28 NOTE — PROGRESS NOTES
Alanna Dee Rodriges   1940   [id number]       10/28/2021     Chief Complaint   Patient presents with   • Follow-up   • Back Pain        HPI   This is a 80-year-old female with a long history of low back pain which seems to progressively worsen.  She also has pain that radiates into the right SI joint right posterior hip and pain that radiates down the lateral aspect of the thigh and into the shin.  She reports that with her pain she cannot lead with her right leg going up steps because the pain is so severe.  She also has difficulties going down the steps.  She has a known scoliosis that she was told about when she was a child.  Her pain is worse with standing and walking.  She reports that she does weight lifting/strengthening exercises regularly.  Approximately 3 years ago she had lumbar steroid injections with great improvement with the first 1 for about 3 months or longer, not as good with the second and minimal improvement with the last.  She also had rhizotomies without any improvement.  She is currently on gabapentin 100 mg in the a.m. and 200 mg at at bedtime.    Chronic Illnesses:  Chronic neck pain  Chronic low back pain  Weakness in the right hand and pincher strength.  Past Medical History:  No date: Anemia  No date: Anxiety disorder  No date: Arthritis  No date: Cataract  No date: Claustrophobia  No date: Depression  No date: Dermatitis  No date: DVT (deep venous thrombosis) (Formerly [facility])      Comment:  1975  No date: Elbow pain  No date: GERD (gastroesophageal reflux disease)  No date: History of blood transfusion  No date: History of hepatitis C virus infection  No date: Hypothyroidism  No date: Kidney stones  No date: Migraine      Comment:  occular  No date: PONV (postoperative nausea and vomiting)  No date: Ptosis due to aging  No date: Seborrhea  1975: Seizure (Formerly [facility])      Comment:  during hospitalization as a side effect of Tofranil   No date: Shoulder pain      Comment:  right  No date: Ulcerative  colitis (HCC)  No date: Ulnar nerve compression, right     Past Surgical History:   Procedure Laterality Date   • BUNIONECTOMY Bilateral    • CARPAL TUNNEL RELEASE Right 1/5/2018    Procedure: CARPAL TUNNEL RELEASE RIGHT ;  Surgeon: Edmond Mccrary MD;  Location:  [facility];  Service:    • CARPAL TUNNEL RELEASE Right 2017   • CATARACT EXTRACTION, BILATERAL Bilateral 12/2020    in [place]   • COLECTOMY TOTAL     • COLON SURGERY  1975    resection   • D & C AND LAPAROSCOPY     • FACELIFT  01/2015    chemical peel   • HYSTERECTOMY  1994    bso   • ILEOSTOMY     • OOPHORECTOMY  1994   • SKIN BIOPSY     • ULNAR NERVE DECOMPRESSION Right 12/9/2016    Procedure: RIGHT ULNAR NERVE DECOMPRESSION;  Surgeon: Edmond Mccrary MD;  Location:  [facility];  Service:    • URETHRAL DILATION      multiple        Allergies   Allergen Reactions   • Codeine Nausea Only   • Morphine And Related Hallucinations     And itching After 3 days          Current Outpatient Medications:   •  acyclovir (ZOVIRAX) 400 MG tablet, TAKE 1 TABLET BY MOUTH  TWICE DAILY, Disp: 180 tablet, Rfl: 1  •  ALPRAZolam (XANAX) 0.5 MG tablet, Take 1 tablet by mouth 2 (Two) Times a Day As Needed for Anxiety., Disp: 30 tablet, Rfl: 0  •  aspirin 81 MG EC tablet, Take 81 mg by mouth Daily. Last dose 11/28/2016, Disp: , Rfl:   •  atorvastatin (LIPITOR) 10 MG tablet, TAKE 1 TABLET BY MOUTH  DAILY (Patient taking differently: Take 10 mg by mouth 3 (Three) Times a Week. Mon, Wed, Fri), Disp: 90 tablet, Rfl: 3  •  BIOTIN PO, Take 1 tablet by mouth Daily. 2 daily , Disp: , Rfl:   •  Boswellia-Glucosamine-Vit D (OSTEO BI-FLEX ONE PER DAY PO), Take 1 tablet by mouth Daily., Disp: , Rfl:   •  cholecalciferol (VITAMIN D3) 1000 units tablet, Take 1,000 Units by mouth Daily., Disp: , Rfl:   •  clobetasol (TEMOVATE) 0.05 % cream, clobetasol 0.05 % topical cream, Disp: , Rfl:   •  dicyclomine (BENTYL) 10 MG capsule, TAKE 1 CAPSULE BY MOUTH  EVERY NIGHT, Disp: 90 capsule, Rfl: 1  •   doxycycline (VIBRAMYCIN) 100 MG capsule, doxycycline hyclate 100 mg capsule, Disp: , Rfl:   •  gabapentin (NEURONTIN) 100 MG capsule, TAKE 1 CAPSULE BY MOUTH 3  TIMES DAILY, Disp: 270 capsule, Rfl: 3  •  ketoconazole (NIZORAL) 2 % cream, Apply 1 application topically Every 12 (Twelve) Hours. Shampoo weekly, Disp: , Rfl:   •  ketoconazole (NIZORAL) 2 % shampoo, Apply  topically to the appropriate area as directed 1 (One) Time Per Week., Disp: 1 each, Rfl: 3  •  levothyroxine (SYNTHROID, LEVOTHROID) 88 MCG tablet, TAKE 1 TABLET BY MOUTH  DAILY, Disp: 90 tablet, Rfl: 3  •  Omega-3 Fatty Acids (FISH OIL PO), Take 1 capsule by mouth Daily. Stopped one week prior to surgery, Disp: , Rfl:   •  Premarin 0.625 MG tablet, TAKE 1 TABLET BY MOUTH  DAILY, Disp: 90 tablet, Rfl: 3  •  Probiotic Product (VISBIOME PROBIOTIC HIGH POT PO), Take 1 tablet by mouth Daily., Disp: , Rfl:   •  RABEprazole (ACIPHEX) 20 MG EC tablet, TAKE 1 TABLET BY MOUTH ONCE DAILY, Disp: 90 tablet, Rfl: 3     Social History     Socioeconomic History   • Marital status:    Tobacco Use   • Smoking status: Former Smoker     Packs/day: 0.50     Years: 30.00     Pack years: 15.00     Types: Cigarettes     Quit date:      Years since quittin.8   • Smokeless tobacco: Never Used   • Tobacco comment: quit smoking in her 40's    Substance and Sexual Activity   • Alcohol use: Yes     Alcohol/week: 14.0 standard drinks     Types: 14 Glasses of wine per week   • Drug use: No   • Sexual activity: Defer        family history includes Alzheimer's disease in an other family member; Cancer in her father and maternal grandmother; Coronary artery disease in her maternal grandfather; Heart disease in her maternal grandfather; Leukemia in an other family member.     Social History    Tobacco Use      Smoking status: Former Smoker        Packs/day: 0.50        Years: 30.00        Pack years: 15        Types: Cigarettes        Quit date:         Years since  "quittin.8      Smokeless tobacco: Never Used      Tobacco comment: quit smoking in her 40's        Body mass index is 25.34 kg/m².   Patient's Body mass index is 25.34 kg/m². indicating that she is overweight (BMI 25-29.9). Obesity-related health conditions include the following: osteoarthritis. Obesity is unchanged. BMI is is above average; BMI management plan is completed.      /80 (BP Location: Left arm, Patient Position: Sitting)   Temp 96.8 °F (36 °C)   Ht 162.6 cm (64\")   Wt 67 kg (147 lb 9.6 oz)   BMI 25.34 kg/m²    Physical Examination:  HEENT-wnl  Lungs-No wheezing or SOB      Neurologic Exam   Cranial nerves II through XII are intact.  The patient is a good historian.  She has excellent strength to direct testing in the right lower extremity.  Straight leg raising is negative.  Internal rotation of the hip exacerbates the right leg pain and hip pain.    Radiological Data Review:  I have independently reviewed the imaging and discussed the findings with the patient   MRI of the lumbar spine shows multilevel degenerative disc disease and facet arthrosis.  There is mild foraminal narrowing on the left but the right foramens are without compression.    Assessment and Plan:  1.  Lumbar rotational scoliosis  2.  Chronic low back pain  3.  Chronic right hip and leg pain  4.  Degenerative osteoarthritis throughout the lower thoracic and lumbar spine.  I have recommended physical therapy and she is in agreement.  I have given her a slip to take with her on her trip back to [place].  5.  I have recommended that she follow-up with her orthopedic surgery in [place].  If she were to continue to have symptoms of the right leg or worsening symptoms an EMG and nerve conduction study may be of benefit or potentially an MRI of the right hip.  6.  I have recommended the patient increase her Neurontin at bedtime to 300 mg and see if this provides her any improvement in her pain.    It was a pleasure providing " neurosurgical evaluation.  I do not find any nerve root compression on the right that would require surgical intervention.    Sherice Carrington, PAC      PCP:  Ismael Jones MD

## 2021-10-30 ENCOUNTER — HOSPITAL ENCOUNTER (OUTPATIENT)
Dept: MAMMOGRAPHY | Facility: HOSPITAL | Age: 81
Discharge: HOME | End: 2021-10-30
Admitting: INTERNAL MEDICINE

## 2021-10-30 DIAGNOSIS — Z12.31 VISIT FOR SCREENING MAMMOGRAM: ICD-10-CM

## 2021-10-30 PROCEDURE — 77063 BREAST TOMOSYNTHESIS BI: CPT

## 2021-10-30 PROCEDURE — 77067 SCR MAMMO BI INCL CAD: CPT | Performed by: RADIOLOGY

## 2021-10-30 PROCEDURE — 77067 SCR MAMMO BI INCL CAD: CPT

## 2021-10-30 PROCEDURE — 77063 BREAST TOMOSYNTHESIS BI: CPT | Performed by: RADIOLOGY

## 2021-11-29 RX ORDER — CONJUGATED ESTROGENS 0.62 MG/1
TABLET, FILM COATED ORAL
Qty: 90 TABLET | Refills: 3 | Status: SHIPPED | OUTPATIENT
Start: 2021-11-29 | End: 2022-08-10

## 2021-11-29 NOTE — TELEPHONE ENCOUNTER
Rx Refill Note  Requested Prescriptions     Pending Prescriptions Disp Refills   • Premarin 0.625 MG tablet [Pharmacy Med Name: PREMARIN  0.625MG  TAB] 90 tablet 3     Sig: TAKE 1 TABLET BY MOUTH  DAILY      Last office visit with prescribing clinician: 09/30/21     Next office visit with prescribing clinician: 08/10/22            Kim Garner LPN  11/29/21, 11:09 EST

## 2021-12-08 DIAGNOSIS — M47.26 OSTEOARTHRITIS OF SPINE WITH RADICULOPATHY, LUMBAR REGION: ICD-10-CM

## 2021-12-08 RX ORDER — GABAPENTIN 100 MG/1
CAPSULE ORAL
Qty: 270 CAPSULE | Refills: 3
Start: 2021-12-08 | End: 2021-12-08 | Stop reason: SDUPTHER

## 2021-12-08 RX ORDER — GABAPENTIN 100 MG/1
CAPSULE ORAL
Qty: 270 CAPSULE | Refills: 3 | Status: SHIPPED | OUTPATIENT
Start: 2021-12-08

## 2022-06-27 ENCOUNTER — TELEPHONE (OUTPATIENT)
Dept: INTERNAL MEDICINE | Facility: CLINIC | Age: 82
End: 2022-06-27

## 2022-06-27 DIAGNOSIS — R79.0 ABNORMAL BLOOD LEVEL OF MAGNESIUM: ICD-10-CM

## 2022-06-27 DIAGNOSIS — E78.2 MIXED HYPERLIPIDEMIA: Primary | ICD-10-CM

## 2022-06-27 DIAGNOSIS — R53.83 OTHER FATIGUE: ICD-10-CM

## 2022-06-27 DIAGNOSIS — E03.9 ACQUIRED HYPOTHYROIDISM: ICD-10-CM

## 2022-06-27 DIAGNOSIS — R73.09 ABNORMAL GLUCOSE: ICD-10-CM

## 2022-06-27 DIAGNOSIS — E55.9 VITAMIN D DEFICIENCY: ICD-10-CM

## 2022-06-27 NOTE — TELEPHONE ENCOUNTER
Caller: Alanna Rodriges    Relationship: Self    Best call back number:[phone]    What orders are you requesting (i.e. lab or imaging):   LAB ORDERS TO MAGNESIUM, CHOLESTEROL AND KIDNEY FUNCTION      In what timeframe would the patient need to come in:   07/04/2022 - 07/08/2022     Additional notes:  PATIENT STATED THAT SHE SEE'S A DOCTOR IN [place] AND WAS INFORMED THAT HE WOULD LIKE FOR PATIENT TO HAVE TAKEN TO CHECK HER MAGNESIUM LEVEL'S AND PATIENT WOULD ALSO LIKE TO HAVE HER CHOLESTEROL AND KIDNEY FUNCTION LEVEL'S CHECKED ALSO

## 2022-07-12 ENCOUNTER — LAB (OUTPATIENT)
Dept: LAB | Facility: HOSPITAL | Age: 82
End: 2022-07-12

## 2022-07-12 DIAGNOSIS — R53.83 OTHER FATIGUE: ICD-10-CM

## 2022-07-12 DIAGNOSIS — R79.0 ABNORMAL BLOOD LEVEL OF MAGNESIUM: ICD-10-CM

## 2022-07-12 DIAGNOSIS — E03.9 ACQUIRED HYPOTHYROIDISM: ICD-10-CM

## 2022-07-12 DIAGNOSIS — E78.2 MIXED HYPERLIPIDEMIA: ICD-10-CM

## 2022-07-12 DIAGNOSIS — R73.09 ABNORMAL GLUCOSE: ICD-10-CM

## 2022-07-12 DIAGNOSIS — E55.9 VITAMIN D DEFICIENCY: ICD-10-CM

## 2022-07-12 LAB
25(OH)D3 SERPL-MCNC: 46 NG/ML (ref 30–100)
ALBUMIN SERPL-MCNC: 4.6 G/DL (ref 3.5–5.2)
ALBUMIN/GLOB SERPL: 1.6 G/DL
ALP SERPL-CCNC: 60 U/L (ref 39–117)
ALT SERPL W P-5'-P-CCNC: 16 U/L (ref 1–33)
ANION GAP SERPL CALCULATED.3IONS-SCNC: 13.5 MMOL/L (ref 5–15)
AST SERPL-CCNC: 26 U/L (ref 1–32)
BASOPHILS # BLD AUTO: 0.07 10*3/MM3 (ref 0–0.2)
BASOPHILS NFR BLD AUTO: 1.3 % (ref 0–1.5)
BILIRUB SERPL-MCNC: 0.6 MG/DL (ref 0–1.2)
BUN SERPL-MCNC: 16 MG/DL (ref 8–23)
BUN/CREAT SERPL: 17.4 (ref 7–25)
CALCIUM SPEC-SCNC: 9.9 MG/DL (ref 8.6–10.5)
CHLORIDE SERPL-SCNC: 99 MMOL/L (ref 98–107)
CHOLEST SERPL-MCNC: 182 MG/DL (ref 0–200)
CO2 SERPL-SCNC: 24.5 MMOL/L (ref 22–29)
CREAT SERPL-MCNC: 0.92 MG/DL (ref 0.57–1)
DEPRECATED RDW RBC AUTO: 43.6 FL (ref 37–54)
EGFRCR SERPLBLD CKD-EPI 2021: 63.5 ML/MIN/1.73
EOSINOPHIL # BLD AUTO: 0.15 10*3/MM3 (ref 0–0.4)
EOSINOPHIL NFR BLD AUTO: 2.7 % (ref 0.3–6.2)
ERYTHROCYTE [DISTWIDTH] IN BLOOD BY AUTOMATED COUNT: 12.6 % (ref 12.3–15.4)
GLOBULIN UR ELPH-MCNC: 2.8 GM/DL
GLUCOSE SERPL-MCNC: 94 MG/DL (ref 65–99)
HBA1C MFR BLD: 5.9 % (ref 4.8–5.6)
HCT VFR BLD AUTO: 41.8 % (ref 34–46.6)
HDLC SERPL-MCNC: 111 MG/DL (ref 40–60)
HGB BLD-MCNC: 14.5 G/DL (ref 12–15.9)
IMM GRANULOCYTES # BLD AUTO: 0.01 10*3/MM3 (ref 0–0.05)
IMM GRANULOCYTES NFR BLD AUTO: 0.2 % (ref 0–0.5)
LDLC SERPL CALC-MCNC: 59 MG/DL (ref 0–100)
LDLC/HDLC SERPL: 0.52 {RATIO}
LYMPHOCYTES # BLD AUTO: 2.43 10*3/MM3 (ref 0.7–3.1)
LYMPHOCYTES NFR BLD AUTO: 43.6 % (ref 19.6–45.3)
MAGNESIUM SERPL-MCNC: 1.5 MG/DL (ref 1.6–2.4)
MCH RBC QN AUTO: 33.2 PG (ref 26.6–33)
MCHC RBC AUTO-ENTMCNC: 34.7 G/DL (ref 31.5–35.7)
MCV RBC AUTO: 95.7 FL (ref 79–97)
MONOCYTES # BLD AUTO: 0.58 10*3/MM3 (ref 0.1–0.9)
MONOCYTES NFR BLD AUTO: 10.4 % (ref 5–12)
NEUTROPHILS NFR BLD AUTO: 2.33 10*3/MM3 (ref 1.7–7)
NEUTROPHILS NFR BLD AUTO: 41.8 % (ref 42.7–76)
NRBC BLD AUTO-RTO: 0 /100 WBC (ref 0–0.2)
PLATELET # BLD AUTO: 218 10*3/MM3 (ref 140–450)
PMV BLD AUTO: 11.6 FL (ref 6–12)
POTASSIUM SERPL-SCNC: 4.6 MMOL/L (ref 3.5–5.2)
PROT SERPL-MCNC: 7.4 G/DL (ref 6–8.5)
RBC # BLD AUTO: 4.37 10*6/MM3 (ref 3.77–5.28)
SODIUM SERPL-SCNC: 137 MMOL/L (ref 136–145)
TRIGL SERPL-MCNC: 65 MG/DL (ref 0–150)
TSH SERPL DL<=0.05 MIU/L-ACNC: 1.24 UIU/ML (ref 0.27–4.2)
VLDLC SERPL-MCNC: 12 MG/DL (ref 5–40)
WBC NRBC COR # BLD: 5.57 10*3/MM3 (ref 3.4–10.8)

## 2022-07-12 PROCEDURE — 82306 VITAMIN D 25 HYDROXY: CPT

## 2022-07-12 PROCEDURE — 83735 ASSAY OF MAGNESIUM: CPT

## 2022-07-12 PROCEDURE — 80053 COMPREHEN METABOLIC PANEL: CPT

## 2022-07-12 PROCEDURE — 83036 HEMOGLOBIN GLYCOSYLATED A1C: CPT

## 2022-07-12 PROCEDURE — 84443 ASSAY THYROID STIM HORMONE: CPT

## 2022-07-12 PROCEDURE — 80061 LIPID PANEL: CPT

## 2022-07-12 PROCEDURE — 36415 COLL VENOUS BLD VENIPUNCTURE: CPT

## 2022-07-12 PROCEDURE — 85025 COMPLETE CBC W/AUTO DIFF WBC: CPT

## 2022-08-02 ENCOUNTER — TELEPHONE (OUTPATIENT)
Dept: INTERNAL MEDICINE | Facility: CLINIC | Age: 82
End: 2022-08-02

## 2022-08-02 DIAGNOSIS — E03.9 ACQUIRED HYPOTHYROIDISM: ICD-10-CM

## 2022-08-02 DIAGNOSIS — E55.9 VITAMIN D DEFICIENCY: ICD-10-CM

## 2022-08-02 DIAGNOSIS — E78.2 MIXED HYPERLIPIDEMIA: Primary | ICD-10-CM

## 2022-08-02 NOTE — TELEPHONE ENCOUNTER
Patient called requesting her labs be put in early for her appointment with Dr Jones 08/10/2022    She also wanted to make sure labs for her magnesium and kidney function were put in due to previously abnormal results

## 2022-08-02 NOTE — TELEPHONE ENCOUNTER
I started put in lab orders, but she just had labs on 22 July.  There is no need to recheck again this soon.

## 2022-08-10 ENCOUNTER — OFFICE VISIT (OUTPATIENT)
Dept: INTERNAL MEDICINE | Facility: CLINIC | Age: 82
End: 2022-08-10

## 2022-08-10 VITALS
DIASTOLIC BLOOD PRESSURE: 64 MMHG | HEART RATE: 72 BPM | SYSTOLIC BLOOD PRESSURE: 120 MMHG | HEIGHT: 64 IN | TEMPERATURE: 98 F | BODY MASS INDEX: 24.69 KG/M2 | WEIGHT: 144.6 LBS

## 2022-08-10 DIAGNOSIS — E03.9 ACQUIRED HYPOTHYROIDISM: ICD-10-CM

## 2022-08-10 DIAGNOSIS — Z87.19 HISTORY OF ULCERATIVE COLITIS: ICD-10-CM

## 2022-08-10 DIAGNOSIS — K21.9 GASTROESOPHAGEAL REFLUX DISEASE WITHOUT ESOPHAGITIS: ICD-10-CM

## 2022-08-10 DIAGNOSIS — I63.40 CEREBROVASCULAR ACCIDENT (CVA) DUE TO EMBOLISM OF CEREBRAL ARTERY: ICD-10-CM

## 2022-08-10 DIAGNOSIS — E78.2 MIXED HYPERLIPIDEMIA: ICD-10-CM

## 2022-08-10 DIAGNOSIS — I48.0 PAROXYSMAL ATRIAL FIBRILLATION: ICD-10-CM

## 2022-08-10 DIAGNOSIS — Z00.00 MEDICARE ANNUAL WELLNESS VISIT, SUBSEQUENT: Primary | ICD-10-CM

## 2022-08-10 DIAGNOSIS — E55.9 VITAMIN D DEFICIENCY: ICD-10-CM

## 2022-08-10 DIAGNOSIS — F41.1 ANXIETY STATE: ICD-10-CM

## 2022-08-10 PROCEDURE — 1159F MED LIST DOCD IN RCRD: CPT | Performed by: INTERNAL MEDICINE

## 2022-08-10 PROCEDURE — G0439 PPPS, SUBSEQ VISIT: HCPCS | Performed by: INTERNAL MEDICINE

## 2022-08-10 PROCEDURE — 1170F FXNL STATUS ASSESSED: CPT | Performed by: INTERNAL MEDICINE

## 2022-08-10 RX ORDER — MAGNESIUM OXIDE 400 MG/1
1 TABLET ORAL 3 TIMES DAILY
COMMUNITY

## 2022-08-10 RX ORDER — PANTOPRAZOLE SODIUM 40 MG/1
1 TABLET, DELAYED RELEASE ORAL DAILY
COMMUNITY
Start: 2022-05-26

## 2022-08-10 RX ORDER — LEVETIRACETAM 500 MG/1
1 TABLET ORAL 2 TIMES DAILY
COMMUNITY
Start: 2022-05-09

## 2022-08-10 RX ORDER — ACETAMINOPHEN 325 MG/1
325 TABLET ORAL EVERY 6 HOURS PRN
COMMUNITY

## 2022-08-10 RX ORDER — LORATADINE 10 MG/1
1 CAPSULE, LIQUID FILLED ORAL DAILY
COMMUNITY

## 2022-08-10 RX ORDER — METOPROLOL SUCCINATE 25 MG/1
1 TABLET, EXTENDED RELEASE ORAL
COMMUNITY
Start: 2022-03-10 | End: 2023-03-10

## 2022-08-10 NOTE — PROGRESS NOTES
QUICK REFERENCE INFORMATION:  The ABCs of the Annual Wellness Visit    Subsequent Medicare Wellness Visit    HEALTH RISK ASSESSMENT    1940    Recent Hospitalizations:  Recently treated at the following:  Other: [facility].        Current Medical Providers:  Patient Care Team:  Ismael Jones MD as PCP - General  Romaine Tanner MD as Consulting Physician (Pain Medicine)  Edmond Mccrary MD (Inactive) as Consulting Physician (Neurosurgery)  Sherice Carrington PA-C as Physician Assistant (Neurosurgery)  Jose Alberto Flores MD as Consulting Physician (Dermatology)  Edmond Hernandez OD (Optometry)        Smoking Status:  Social History     Tobacco Use   Smoking Status Former Smoker   • Packs/day: 0.50   • Years: 30.00   • Pack years: 15.00   • Types: Cigarettes   • Quit date:    • Years since quittin.6   Smokeless Tobacco Never Used   Tobacco Comment    quit smoking in her 40's        Alcohol Consumption:  Social History     Substance and Sexual Activity   Alcohol Use Yes   • Alcohol/week: 14.0 standard drinks   • Types: 14 Glasses of wine per week       Depression Screen:   PHQ-2/PHQ-9 Depression Screening 8/10/2022   Retired PHQ-9 Total Score -   Retired Total Score -   Little Interest or Pleasure in Doing Things 0-->not at all   Feeling Down, Depressed or Hopeless 1-->several days   PHQ-9: Brief Depression Severity Measure Score 1       Health Habits and Functional and Cognitive Screening:  Functional & Cognitive Status 8/10/2022   Do you have difficulty preparing food and eating? No   Do you have difficulty bathing yourself, getting dressed or grooming yourself? No   Do you have difficulty using the toilet? No   Do you have difficulty moving around from place to place? No   Do you have trouble with steps or getting out of a bed or a chair? No   Current Diet Well Balanced Diet   Dental Exam Up to date   Eye Exam Up to date   Exercise (times per week) 4 times per week    Current Exercises Include Pilates        Exercise Comment balance and strength exercises   Current Exercise Activities Include -   Do you need help using the phone?  No   Are you deaf or do you have serious difficulty hearing?  No   Do you need help with transportation? No   Do you need help shopping? No   Do you need help preparing meals?  No   Do you need help with housework?  Yes   Do you need help with laundry? Yes   Do you need help taking your medications? Yes   Do you need help managing money? No   Do you ever drive or ride in a car without wearing a seat belt? No   Have you felt unusual stress, anger or loneliness in the last month? Yes   Who do you live with? Spouse   If you need help, do you have trouble finding someone available to you? No   Have you been bothered in the last four weeks by sexual problems? No   Do you have difficulty concentrating, remembering or making decisions? Yes       Fall Risk Screen:  ALEJANDRO Fall Risk Assessment was completed, and patient is at LOW risk for falls.Assessment completed on:8/10/2022    ACE III MINI        Does the patient have evidence of cognitive impairment? No    Aspirin use counseling: Taking ASA appropriately as indicated    Recent Lab Results:  CMP:  Lab Results   Component Value Date     (H) 11/22/2021     (H) 11/22/2021    BUN 16 07/12/2022    CREATININE 0.92 07/12/2022    EGFRIFNONA 51 (L) 07/31/2020    BCR 17.4 07/12/2022     07/12/2022    K 4.6 07/12/2022    CO2 24.5 07/12/2022    CALCIUM 9.9 07/12/2022    ALBUMIN 4.60 07/12/2022    BILITOT 0.6 07/12/2022    ALKPHOS 60 07/12/2022    AST 26 07/12/2022    ALT 16 07/12/2022     HbA1c:  Lab Results   Component Value Date    HGBA1C 5.90 (H) 07/12/2022     Microalbumin:  No results found for: MICROALBUR, POCMALB, POCCREAT  Lipid Panel  Lab Results   Component Value Date    CHOL 182 07/12/2022    TRIG 65 07/12/2022     (H) 07/12/2022    LDL 59 07/12/2022    AST 26 07/12/2022    ALT 16  07/12/2022       Visual Acuity:  No exam data present    Age-appropriate Screening Schedule:  Refer to the list below for future screening recommendations based on patient's age, sex and/or medical conditions. Orders for these recommended tests are listed in the plan section. The patient has been provided with a written plan.    Health Maintenance   Topic Date Due   • DXA SCAN  09/18/2021   • INFLUENZA VACCINE  10/01/2022   • LIPID PANEL  07/12/2023   • MAMMOGRAM  10/30/2023   • TDAP/TD VACCINES (3 - Td or Tdap) 03/20/2030   • ZOSTER VACCINE  Completed   • COLONOSCOPY  Discontinued        Subjective   History of Present Illness    Alanna Rodriges is a 81 y.o. female who presents for a Subsequent Wellness Visit.    She comes in for subsequent Medicare annual wellness examination and for follow-up of hyperlipidemia, hypothyroidism, vitamin D deficiency, GERD, anxiety, and history of ulcerative colitis. She also has significant osteoarthritis of the lumbar and cervical spine. She is accompanied by an adult male.    The patient reports she had 2 seizures while in the hospital within the first 5 days following a stroke. She states it was never explained to her why she had the seizures. She states she is feeling pretty good. The patient states her gait is not quite as steady as she used to be. She states that the stroke has definitely affected her stamina and she can become frustrated over minor things. The patient states she is a little more labile. She states she has to be quiet and she gets words mixed up sometimes.     The patient states her right hand is in pretty bad shape. She reports she has carpal tunnel syndrome. She states she had ulnar surgery and has had no improvement. She states she is going to see Dr. Manuel later this month for a consultation. She states she was told she would not take her on as a patient. She states she saw another hand doctor in [place] who told her there was nothing he  could do as far as surgery. She states she does hand exercises, though not do as much as she should. She states she thinks she should go for refreshers on physical therapy.     The patient states she is going to resume taking Claritin for the season in the fall. The patient asks if she should take a baby aspirin since she is on Eliquis. She states she has been having a lot of problems sleeping. She states it feels like a band of nerves that burn across lower leg. The patient adds that sometimes her back or a stomach ache will keep her awake at night. The patient states that she has stomach discomfort during the day, but is usually too busy to pay attention to it. She adds that it is usually a dull pain, but sometimes at night it gets worse. She confirms that she has irritable bowel syndrome and has had this issues for a long time.    The patient states that she is taking Keppra, Eliquis, metoprolol, and magnesium. The patient reports that she had diarrhea for a while after staring supplemental magnesium, but it is now only occasional. The patient reports she is taking atorvastatin and is having a lot of muscle and joint pain. She reports that in the past she took in Monday, Wednesday, and Friday and that helped.    The patient requests a referral to Dr. Olivier, a local cardiologist.    CHRONIC CONDITIONS    The following portions of the patient's history were reviewed and updated as appropriate: allergies, current medications, past family history, past medical history, past social history, past surgical history and problem list.    Outpatient Medications Prior to Visit   Medication Sig Dispense Refill   • acetaminophen (TYLENOL) 325 MG tablet Take 325 mg by mouth Every 6 (Six) Hours As Needed.     • acyclovir (ZOVIRAX) 400 MG tablet TAKE 1 TABLET BY MOUTH  TWICE DAILY (Patient taking differently: Take 200 mg by mouth Daily.) 180 tablet 1   • ALPRAZolam (XANAX) 0.5 MG tablet Take 1 tablet by mouth 2 (Two) Times a  Day As Needed for Anxiety. 30 tablet 0   • apixaban (ELIQUIS) 5 MG tablet tablet Take 1 tablet by mouth 2 (Two) Times a Day.     • aspirin 81 MG EC tablet Take 81 mg by mouth Daily. Last dose 11/28/2016     • atorvastatin (LIPITOR) 10 MG tablet TAKE 1 TABLET BY MOUTH  DAILY (Patient taking differently: Take 10 mg by mouth 3 (Three) Times a Week. Mon, Wed, Fri) 90 tablet 3   • Boswellia-Glucosamine-Vit D (OSTEO BI-FLEX ONE PER DAY PO) Take 1 tablet by mouth Daily.     • clobetasol (TEMOVATE) 0.05 % cream clobetasol 0.05 % topical cream     • dicyclomine (BENTYL) 10 MG capsule TAKE 1 CAPSULE BY MOUTH  EVERY NIGHT (Patient taking differently: Take 10 mg by mouth 3 (Three) Times a Week.) 90 capsule 1   • gabapentin (NEURONTIN) 100 MG capsule TAKE 1 CAPSULE BY MOUTH 3  TIMES DAILY (Patient taking differently: Take 100 mg by mouth 3 (Three) Times a Week.) 270 capsule 3   • ketoconazole (NIZORAL) 2 % cream Apply 1 application topically Every 12 (Twelve) Hours. Shampoo weekly     • ketoconazole (NIZORAL) 2 % shampoo Apply  topically to the appropriate area as directed 1 (One) Time Per Week. 1 each 3   • levETIRAcetam (KEPPRA) 500 MG tablet Take 1 tablet by mouth 2 (Two) Times a Day. 1/2 tab q am and 1 tab q pm     • levothyroxine (SYNTHROID, LEVOTHROID) 88 MCG tablet TAKE 1 TABLET BY MOUTH  DAILY 90 tablet 3   • magnesium oxide (MAG-OX) 400 MG tablet Take 1 tablet by mouth 3 (Three) Times a Day.     • metoprolol succinate XL (TOPROL-XL) 25 MG 24 hr tablet Take 1 tablet by mouth every night at bedtime.     • pantoprazole (PROTONIX) 40 MG EC tablet Take 1 tablet by mouth Daily.     • Loratadine 10 MG capsule Take 1 capsule by mouth Daily.     • BIOTIN PO Take 1 tablet by mouth Daily. 2 daily      • cholecalciferol (VITAMIN D3) 1000 units tablet Take 1,000 Units by mouth Daily.     • doxycycline (VIBRAMYCIN) 100 MG capsule doxycycline hyclate 100 mg capsule     • Omega-3 Fatty Acids (FISH OIL PO) Take 1 capsule by mouth Daily.  Stopped one week prior to surgery     • Premarin 0.625 MG tablet TAKE 1 TABLET BY MOUTH  DAILY 90 tablet 3   • Probiotic Product (VISBIOME PROBIOTIC HIGH POT PO) Take 1 tablet by mouth Daily.     • RABEprazole (ACIPHEX) 20 MG EC tablet TAKE 1 TABLET BY MOUTH ONCE DAILY 90 tablet 3     No facility-administered medications prior to visit.       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 or radiculopathy   • Neuroforaminal stenosis of spine   • Leg length discrepancy   • Ulnar neuropathy at elbow of right upper extremity   • CTS (carpal tunnel syndrome)   • Carpal tunnel syndrome of right wrist   • Displacement of intervertebral disc of lumbosacral region   • Anxiety state   • Acute non intractable tension-type headache   • Asthma   • Bilateral hearing loss   • BMI 26.0-26.9,adult   • Depression   • Dysuria   • GERD (gastroesophageal reflux disease)   • Hypothyroidism   • Irritable bowel syndrome   • Medicare annual wellness visit, subsequent   • Mixed hyperlipidemia   • Osteoarthrosis   • Disc disorder of cervical region   • Vitamin D deficiency   • Numbness of fingers   • Arthritis   • Cerebrovascular accident (CVA) due to embolism of cerebral artery (HCC)   • Paroxysmal atrial fibrillation (HCC)       Advance Care Planning:  ACP discussion was held with the patient during this visit. Patient has an advance directive in EMR which is still valid.     Identification of Risk Factors:  Risk factors include: Advance Directive Discussion.    Review of Systems   Constitutional: Negative for chills and fever.   HENT: Negative for congestion, ear pain and sinus pressure.    Respiratory: Negative for cough, chest tightness, shortness of breath and wheezing.    Cardiovascular: Negative for chest pain and  "palpitations.   Gastrointestinal: Negative for blood in stool and constipation.   Skin: Negative for color change.   Neurological: Negative for dizziness, speech difficulty and headaches.   Psychiatric/Behavioral: Negative for confusion. The patient is not nervous/anxious.        Compared to one year ago, the patient feels her physical health is worse.  Compared to one year ago, the patient feels her mental health is the same.    Objective     Physical Exam  Vitals reviewed.   Constitutional:       Appearance: She is well-developed.   HENT:      Head: Normocephalic and atraumatic.   Cardiovascular:      Rate and Rhythm: Normal rate and regular rhythm.      Heart sounds: Normal heart sounds. No murmur heard.  Pulmonary:      Effort: Pulmonary effort is normal.      Breath sounds: Normal breath sounds.   Neurological:      Mental Status: She is alert and oriented to person, place, and time.        Laboratory results from 07/12/2022 were reviewed with the patient.    Procedures     Vitals:    08/10/22 0852   BP: 120/64   BP Location: Left arm   Patient Position: Sitting   Cuff Size: Adult   Pulse: 72   Temp: 98 °F (36.7 °C)   Weight: 65.6 kg (144 lb 9.6 oz)   Height: 162 cm (63.78\")   PainSc: 8  Comment: at worst   PainLoc: Back  Comment: also lower right leg and abdomen       BMI is within normal parameters. No other follow-up for BMI required.      Assessment & Plan   Problem List Items Addressed This Visit        Cardiac and Vasculature    Mixed hyperlipidemia    Relevant Medications    atorvastatin (LIPITOR) 10 MG tablet    Paroxysmal atrial fibrillation (HCC)    Relevant Medications    metoprolol succinate XL (TOPROL-XL) 25 MG 24 hr tablet    Other Relevant Orders    Ambulatory Referral to Cardiology       Endocrine and Metabolic    Hypothyroidism    Relevant Medications    levothyroxine (SYNTHROID, LEVOTHROID) 88 MCG tablet    metoprolol succinate XL (TOPROL-XL) 25 MG 24 hr tablet    Vitamin D deficiency       " Gastrointestinal Abdominal     History of ulcerative colitis    GERD (gastroesophageal reflux disease)    Relevant Medications    dicyclomine (BENTYL) 10 MG capsule    pantoprazole (PROTONIX) 40 MG EC tablet    magnesium oxide (MAG-OX) 400 MG tablet       Health Encounters    Medicare annual wellness visit, subsequent - Primary       Mental Health    Anxiety state       Neuro    Cerebrovascular accident (CVA) due to embolism of cerebral artery (HCC)        Patient Self-Management and Personalized Health Advice  The patient has been provided with information about: diet and exercise and preventive services including:   · Annual Wellness Visit (AWV).    Outpatient Encounter Medications as of 8/10/2022   Medication Sig Dispense Refill   • acetaminophen (TYLENOL) 325 MG tablet Take 325 mg by mouth Every 6 (Six) Hours As Needed.     • acyclovir (ZOVIRAX) 400 MG tablet TAKE 1 TABLET BY MOUTH  TWICE DAILY (Patient taking differently: Take 200 mg by mouth Daily.) 180 tablet 1   • ALPRAZolam (XANAX) 0.5 MG tablet Take 1 tablet by mouth 2 (Two) Times a Day As Needed for Anxiety. 30 tablet 0   • apixaban (ELIQUIS) 5 MG tablet tablet Take 1 tablet by mouth 2 (Two) Times a Day.     • aspirin 81 MG EC tablet Take 81 mg by mouth Daily. Last dose 11/28/2016     • atorvastatin (LIPITOR) 10 MG tablet TAKE 1 TABLET BY MOUTH  DAILY (Patient taking differently: Take 10 mg by mouth 3 (Three) Times a Week. Mon, Wed, Fri) 90 tablet 3   • Boswellia-Glucosamine-Vit D (OSTEO BI-FLEX ONE PER DAY PO) Take 1 tablet by mouth Daily.     • clobetasol (TEMOVATE) 0.05 % cream clobetasol 0.05 % topical cream     • dicyclomine (BENTYL) 10 MG capsule TAKE 1 CAPSULE BY MOUTH  EVERY NIGHT (Patient taking differently: Take 10 mg by mouth 3 (Three) Times a Week.) 90 capsule 1   • gabapentin (NEURONTIN) 100 MG capsule TAKE 1 CAPSULE BY MOUTH 3  TIMES DAILY (Patient taking differently: Take 100 mg by mouth 3 (Three) Times a Week.) 270 capsule 3   •  ketoconazole (NIZORAL) 2 % cream Apply 1 application topically Every 12 (Twelve) Hours. Shampoo weekly     • ketoconazole (NIZORAL) 2 % shampoo Apply  topically to the appropriate area as directed 1 (One) Time Per Week. 1 each 3   • levETIRAcetam (KEPPRA) 500 MG tablet Take 1 tablet by mouth 2 (Two) Times a Day. 1/2 tab q am and 1 tab q pm     • levothyroxine (SYNTHROID, LEVOTHROID) 88 MCG tablet TAKE 1 TABLET BY MOUTH  DAILY 90 tablet 3   • magnesium oxide (MAG-OX) 400 MG tablet Take 1 tablet by mouth 3 (Three) Times a Day.     • metoprolol succinate XL (TOPROL-XL) 25 MG 24 hr tablet Take 1 tablet by mouth every night at bedtime.     • pantoprazole (PROTONIX) 40 MG EC tablet Take 1 tablet by mouth Daily.     • Loratadine 10 MG capsule Take 1 capsule by mouth Daily.     • [DISCONTINUED] BIOTIN PO Take 1 tablet by mouth Daily. 2 daily      • [DISCONTINUED] cholecalciferol (VITAMIN D3) 1000 units tablet Take 1,000 Units by mouth Daily.     • [DISCONTINUED] doxycycline (VIBRAMYCIN) 100 MG capsule doxycycline hyclate 100 mg capsule     • [DISCONTINUED] Omega-3 Fatty Acids (FISH OIL PO) Take 1 capsule by mouth Daily. Stopped one week prior to surgery     • [DISCONTINUED] Premarin 0.625 MG tablet TAKE 1 TABLET BY MOUTH  DAILY 90 tablet 3   • [DISCONTINUED] Probiotic Product (VISBIOME PROBIOTIC HIGH POT PO) Take 1 tablet by mouth Daily.     • [DISCONTINUED] RABEprazole (ACIPHEX) 20 MG EC tablet TAKE 1 TABLET BY MOUTH ONCE DAILY 90 tablet 3     No facility-administered encounter medications on file as of 8/10/2022.     1. Prevention  - Currently her health is somewhat overshadowed by the recent stroke and onset of atrial fibrillation. She is fully vaccinated against COVID-19.    2. Embolic stroke  - This occurred last 11/2021. She presented with an expressive aphasia. She now has only subtle deficits. She is on 81 mg aspirin. She had two seizures while in the hospital and was placed on Keppra, now on a lower dose of Keppra  without evidence of seizures. She will follow up with her neurologist in [place].    3. Paroxysmal atrial fibrillation  - This is managed with apixaban and metoprolol. She is referred to Dr. Olivier per her request so that she can have a local cardiologist.    4. Hypothyroidism  - TSH is normal. She is clinically euthyroid on current dose of levothyroxine.    5. Hyperlipidemia  - Lipids are well controlled. She has been statin intolerant in the past and is experiencing myalgias since having resumed atorvastatin since the stroke. She will hold this for 2 to 3 weeks and then resume at a Monday, Wednesday, Friday dose as she tolerated before. I did tell her if the symptoms did not improve after completely holding atorvastatin, then her myalgias are likely not due to her statin.    6. Vitamin D deficiency  - Vitamin D level is controlled. Continue current level of vitamin D replacement.    7. GERD  - GERD is controlled on pantoprazole.    8. Anxiety  - Anxiety appears to be reasonably well controlled. The patient takes alprazolam only as needed.      Reviewed use of high risk medication in the elderly: yes  Reviewed for potential of harmful drug interactions in the elderly: yes    Follow Up:  Return in about 1 year (around 8/10/2023) for Medicare Wellness.     There are no Patient Instructions on file for this visit.    An After Visit Summary and PPPS with all of these plans were given to the patient.          Transcribed from ambient dictation for Ismael Jones MD by Gisselle Richards.  08/10/22   10:58 EDT    Patient verbalized consent to the visit recording.

## 2022-09-06 RX ORDER — LEVOTHYROXINE SODIUM 88 UG/1
88 TABLET ORAL DAILY
Qty: 90 TABLET | Refills: 3 | Status: SHIPPED | OUTPATIENT
Start: 2022-09-06

## 2022-09-15 ENCOUNTER — TELEMEDICINE - AUDIO (OUTPATIENT)
Dept: INTERNAL MEDICINE | Facility: CLINIC | Age: 82
End: 2022-09-15

## 2022-09-15 ENCOUNTER — TELEPHONE (OUTPATIENT)
Dept: INTERNAL MEDICINE | Facility: CLINIC | Age: 82
End: 2022-09-15

## 2022-09-15 DIAGNOSIS — I48.0 PAROXYSMAL ATRIAL FIBRILLATION: ICD-10-CM

## 2022-09-15 DIAGNOSIS — U07.1 COVID-19 VIRUS DETECTED: Primary | ICD-10-CM

## 2022-09-15 DIAGNOSIS — E78.2 MIXED HYPERLIPIDEMIA: ICD-10-CM

## 2022-09-15 PROCEDURE — 99443 PR PHYS/QHP TELEPHONE EVALUATION 21-30 MIN: CPT | Performed by: INTERNAL MEDICINE

## 2022-09-15 NOTE — PROGRESS NOTES
Chief Complaint   Patient presents with   • Covid-19 Home Monitoring Phone Call   You have chosen to receive care through a telephone visit. Do you consent to use a telephone visit for your medical care today? YesThis visit has been rescheduled as a phone visit to comply with patient safety concerns in accordance with CDC recommendations. Total time of discussion was 21 minutes.    She was at home and I was at office calling her   History of Present Illness  81 y.o. female presents for 3 days of symptoms of progressively more chest congestion and cough and low grae fever and tested positive tonight for covid     Review of Systems   Constitutional: Positive for fever.   HENT: Positive for postnasal drip.    Respiratory: Positive for cough. Negative for shortness of breath.    Musculoskeletal: Positive for myalgias.     .    PMSFH:  The following portions of the patient's history were reviewed and updated as appropriate: allergies, current medications, past family history, past medical history, past social history, past surgical history and problem list.      Current Outpatient Medications:   •  acetaminophen (TYLENOL) 325 MG tablet, Take 325 mg by mouth Every 6 (Six) Hours As Needed., Disp: , Rfl:   •  acyclovir (ZOVIRAX) 400 MG tablet, TAKE 1 TABLET BY MOUTH  TWICE DAILY (Patient taking differently: Take 200 mg by mouth Daily.), Disp: 180 tablet, Rfl: 1  •  ALPRAZolam (XANAX) 0.5 MG tablet, Take 1 tablet by mouth 2 (Two) Times a Day As Needed for Anxiety., Disp: 30 tablet, Rfl: 0  •  apixaban (ELIQUIS) 5 MG tablet tablet, Take 1 tablet by mouth 2 (Two) Times a Day., Disp: , Rfl:   •  aspirin 81 MG EC tablet, Take 81 mg by mouth Daily. Last dose 11/28/2016, Disp: , Rfl:   •  atorvastatin (LIPITOR) 10 MG tablet, TAKE 1 TABLET BY MOUTH  DAILY (Patient taking differently: Take 10 mg by mouth 3 (Three) Times a Week. Mon, Wed, Fri), Disp: 90 tablet, Rfl: 3  •  Boswellia-Glucosamine-Vit D (OSTEO BI-FLEX ONE PER DAY PO),  Take 1 tablet by mouth Daily., Disp: , Rfl:   •  clobetasol (TEMOVATE) 0.05 % cream, clobetasol 0.05 % topical cream, Disp: , Rfl:   •  dicyclomine (BENTYL) 10 MG capsule, TAKE 1 CAPSULE BY MOUTH  EVERY NIGHT (Patient taking differently: Take 10 mg by mouth 3 (Three) Times a Week.), Disp: 90 capsule, Rfl: 1  •  gabapentin (NEURONTIN) 100 MG capsule, TAKE 1 CAPSULE BY MOUTH 3  TIMES DAILY (Patient taking differently: Take 100 mg by mouth 3 (Three) Times a Week.), Disp: 270 capsule, Rfl: 3  •  ketoconazole (NIZORAL) 2 % cream, Apply 1 application topically Every 12 (Twelve) Hours. Shampoo weekly, Disp: , Rfl:   •  ketoconazole (NIZORAL) 2 % shampoo, Apply  topically to the appropriate area as directed 1 (One) Time Per Week., Disp: 1 each, Rfl: 3  •  levETIRAcetam (KEPPRA) 500 MG tablet, Take 1 tablet by mouth 2 (Two) Times a Day. 1/2 tab q am and 1 tab q pm, Disp: , Rfl:   •  levothyroxine (SYNTHROID, LEVOTHROID) 88 MCG tablet, TAKE 1 TABLET BY MOUTH  DAILY, Disp: 90 tablet, Rfl: 3  •  Loratadine 10 MG capsule, Take 1 capsule by mouth Daily., Disp: , Rfl:   •  magnesium oxide (MAG-OX) 400 MG tablet, Take 1 tablet by mouth 3 (Three) Times a Day., Disp: , Rfl:   •  metoprolol succinate XL (TOPROL-XL) 25 MG 24 hr tablet, Take 1 tablet by mouth every night at bedtime., Disp: , Rfl:   •  Nirmatrelvir&Ritonavir 300/100 (PAXLOVID) 20 x 150 MG & 10 x 100MG tablet therapy pack tablet, Take 3 tablets by mouth 2 (Two) Times a Day for 5 days., Disp: 30 tablet, Rfl: 0  •  pantoprazole (PROTONIX) 40 MG EC tablet, Take 1 tablet by mouth Daily., Disp: , Rfl:     VITALS:  There were no vitals taken for this visit.    Physical Exam  Neurological:      Mental Status: She is alert.   Psychiatric:         Mood and Affect: Mood normal.         Behavior: Behavior normal.         LABS:      Procedures         ASSESSMENT/PLAN:  Problems Addressed this Visit        Cardiac and Vasculature    Mixed hyperlipidemia     She is already holding her  lipitor so will not need to adjust it for paxlovid          Paroxysmal atrial fibrillation (HCC)     She will half her eliquis dose for 5 days while taking paxlovid           Other Visit Diagnoses     COVID-19 virus detected    -  Primary    she agreed to verbal consent for paxlovid She will half her eliquis dose for 5 days while taking paxlovid    Relevant Medications    Nirmatrelvir&Ritonavir 300/100 (PAXLOVID) 20 x 150 MG & 10 x 100MG tablet therapy pack tablet      Diagnoses       Codes Comments    COVID-19 virus detected    -  Primary ICD-10-CM: U07.1  ICD-9-CM: 079.89 she agreed to verbal consent for paxlovid She will half her eliquis dose for 5 days while taking paxlovid    Paroxysmal atrial fibrillation (HCC)     ICD-10-CM: I48.0  ICD-9-CM: 427.31     Mixed hyperlipidemia     ICD-10-CM: E78.2  ICD-9-CM: 272.2           FOLLOW-UP:  No follow-ups on file.      Electronically signed by:    Travis Wilcox MD

## 2022-09-15 NOTE — TELEPHONE ENCOUNTER
Caller: Alanna Rodriges    Relationship to patient: Self    Best call back number: [phone]    Date of exposure: NA    Date of positive COVID19 test: 09/15/22 AT HOME TEST    Date if possible COVID19 exposure: NA    COVID19 symptoms: RUNNING NOSE, DRY COUGH, LOW GRADE FEVER, CHEST FEEL TIGHT, FATIGUE, SORE THROAT, MUSCLES ACHES    Date of initial quarantine: 9/14/22    Additional information or concerns: PATIENT WOULD LIKE TO HAVE SOMETHING CALLED IN FOR SYMPTOMS OR WOULD LIKE TO KNOW WHAT CAN BE TAKEN. PATIENT WOULD LIKE TO KNOW WHAT IS THE QUARANTINE LENGTH.     What is the patients preferred pharmacy:     [id number] - [address] AT [facility] - [phone]  - [phone]   [phone]

## 2022-09-28 ENCOUNTER — TRANSCRIBE ORDERS (OUTPATIENT)
Dept: INTERNAL MEDICINE | Facility: CLINIC | Age: 82
End: 2022-09-28

## 2022-09-28 DIAGNOSIS — Z12.31 VISIT FOR SCREENING MAMMOGRAM: Primary | ICD-10-CM

## 2022-10-04 ENCOUNTER — TELEPHONE (OUTPATIENT)
Dept: INTERNAL MEDICINE | Facility: CLINIC | Age: 82
End: 2022-10-04

## 2022-10-04 ENCOUNTER — FLU SHOT (OUTPATIENT)
Dept: INTERNAL MEDICINE | Facility: CLINIC | Age: 82
End: 2022-10-04

## 2022-10-04 DIAGNOSIS — F41.1 ANXIETY STATE: Primary | ICD-10-CM

## 2022-10-04 DIAGNOSIS — Z23 NEED FOR INFLUENZA VACCINATION: Primary | ICD-10-CM

## 2022-10-04 PROCEDURE — 90662 IIV NO PRSV INCREASED AG IM: CPT | Performed by: INTERNAL MEDICINE

## 2022-10-04 PROCEDURE — G0008 ADMIN INFLUENZA VIRUS VAC: HCPCS | Performed by: INTERNAL MEDICINE

## 2022-10-04 RX ORDER — ALPRAZOLAM 0.5 MG/1
0.5 TABLET ORAL 2 TIMES DAILY PRN
Qty: 30 TABLET | Refills: 2 | Status: SHIPPED | OUTPATIENT
Start: 2022-10-04

## 2022-10-04 RX ORDER — ROSUVASTATIN CALCIUM 10 MG/1
10 TABLET, COATED ORAL DAILY
Qty: 90 TABLET | Refills: 1 | Status: SHIPPED | OUTPATIENT
Start: 2022-10-04 | End: 2023-01-16 | Stop reason: SDUPTHER

## 2022-10-04 NOTE — TELEPHONE ENCOUNTER
I renewed xanax, and sent in rosuvastatin.  The instructions for rosuvastatin say daily, but suggest that she start out taking it twice a week, 3 days apart, or every 3rd day if easier.  After 3-4 weeks, if she's tolerating themedication, then go to [facility]

## 2022-10-04 NOTE — TELEPHONE ENCOUNTER
Rx Refill Note  Requested Prescriptions     Pending Prescriptions Disp Refills   • ALPRAZolam (XANAX) 0.5 MG tablet 30 tablet 0     Sig: Take 1 tablet by mouth 2 (Two) Times a Day As Needed for Anxiety.      Last office visit with prescribing clinician: 09/15/22  Next office visit with prescribing clinician: 8/16/2023     LA: 12/04/19 #30 0R             Kim Garner LPN  10/04/22, 11:43 EDT

## 2022-10-04 NOTE — TELEPHONE ENCOUNTER
Patient walked in for her flu shot today. She would like a refill of her xanax. She also said you would prescribe a new statin drug since atorvastatin caused myalgia. She stated it took about 7 weeks for the myalgia to go away. She also mentioned the new statin drug would be taken 3 days a week on M, W, F.

## 2022-10-13 ENCOUNTER — TREATMENT (OUTPATIENT)
Dept: PHYSICAL THERAPY | Facility: CLINIC | Age: 82
End: 2022-10-13

## 2022-10-13 ENCOUNTER — TRANSCRIBE ORDERS (OUTPATIENT)
Dept: PHYSICAL THERAPY | Facility: CLINIC | Age: 82
End: 2022-10-13

## 2022-10-13 DIAGNOSIS — G56.11 MEDIAN NEUROPATHY, RIGHT: ICD-10-CM

## 2022-10-13 DIAGNOSIS — G56.21 ULNAR NEUROPATHY OF RIGHT UPPER EXTREMITY: ICD-10-CM

## 2022-10-13 DIAGNOSIS — G56.11 RIGHT MEDIAN NERVE NEUROPATHY: Primary | ICD-10-CM

## 2022-10-13 DIAGNOSIS — G56.21 ULNAR NEUROPATHY OF RIGHT UPPER EXTREMITY: Primary | ICD-10-CM

## 2022-10-13 DIAGNOSIS — M21.511 RIGHT CLAWHAND DUE TO INTRINSIC MINUS DEFORMITY: ICD-10-CM

## 2022-10-13 PROCEDURE — L3913 HFO W/O JOINTS CF: HCPCS | Performed by: PHYSICAL THERAPIST

## 2022-10-14 NOTE — PROGRESS NOTES
Alanna Rodriges 1940   Diagnosis/ Surgery: right median and ulnar nerve neuropathy               Date Of Injury: 2017    Date Of Surgery:2017    Hand Dominance: right   History of Present Condition: severe compression of median and ulnar nerve causing neuropathy and muscle atrophy. Had carpal and cubital tunnel releases in 2017 that did not help.   Medical/Vocational History/ Medications: retired, plays bridge     Pain:  none   Edema: none  Sensibility: WNL   Wound Status:none   ROM/ Strength: intrinsic minus deformity, PROM full. Simian type hand posture     Splinting:  · Patient was measure and fit with a custom fabricated anti-claw splint.   · Patient was instructed in wearing schedule, precautions and care of the splint during this visit.   · Patient was instructed in proper donning/doffing of splint.   Assessment:  · Patient was fitted and appropriate splint was fabricated this date.  · Patient reported that splint was comfortable and had no complications with the fit of the splint.  · Patient was instructed and patient verbalized understanding of precautions, wear and care of the splint.   · Patient demonstrated independent donning/doffing of splint during treatment today.  Goals:  · Patient was fitted properly with appropriate splint for diagnosis  · Patient was educated on precautions, wear schedule and care of splint  · Patient demonstrated independence with donning/doffing of the splint.  · Splint was provided to Protect Healing Structures, Restrict Mobility, Improve joint alignment.  Plan:  · No additional treatment is required for this patient at this time. The patient is therefore discharged from therapy.  · Patient advised to contact therapist with any additional questions or concerns regarding the fit and function of the splint.  · Patient will be seen for splint issues as needed   · Wear Instructions: as needed for functional use and night splint.           PT SIGNATURE: Lina Mcrae  PT   DATE TREATMENT INITIATED: 10/14/2022    Initial Certification  Certification Period: 1/12/2023  I certify that the therapy services are furnished while this patient is under my care.  The services outlined above are required by this patient, and will be reviewed every 90 days.     PHYSICIAN:       DATE:     Please sign and return via fax to [phone].. Thank you, [facility].

## 2022-10-24 ENCOUNTER — DOCUMENTATION (OUTPATIENT)
Dept: CARDIOLOGY | Facility: CLINIC | Age: 82
End: 2022-10-24

## 2022-10-24 NOTE — PROGRESS NOTES
Confirmed appt with pt    [place] splits time between [place] and [place] and is wanting to establish care with cardiologist in [place]- will continue to see [facility] in [place] as well    [facility] recs in [place] [facility] ( care everywhere)    pcp epic

## 2022-10-25 ENCOUNTER — OFFICE VISIT (OUTPATIENT)
Dept: CARDIOLOGY | Facility: CLINIC | Age: 82
End: 2022-10-25

## 2022-10-25 VITALS
HEIGHT: 64 IN | BODY MASS INDEX: 25.33 KG/M2 | OXYGEN SATURATION: 97 % | SYSTOLIC BLOOD PRESSURE: 122 MMHG | HEART RATE: 68 BPM | WEIGHT: 148.4 LBS | DIASTOLIC BLOOD PRESSURE: 76 MMHG

## 2022-10-25 DIAGNOSIS — I48.0 PAROXYSMAL ATRIAL FIBRILLATION: Primary | ICD-10-CM

## 2022-10-25 PROCEDURE — 93000 ELECTROCARDIOGRAM COMPLETE: CPT | Performed by: INTERNAL MEDICINE

## 2022-10-25 PROCEDURE — 99204 OFFICE O/P NEW MOD 45 MIN: CPT | Performed by: INTERNAL MEDICINE

## 2022-10-25 NOTE — PROGRESS NOTES
[facility]   Consult  Alanna Rodriges  1940    VISIT DATE:  10/25/22    PCP:   Ismael Jones MD  [id number] [place] [id number]        CC:  Atrial Fibrillation      Problem List:  1.  Paroxysmal A. Fib  2.  Previous CVA  -November 2021  -CTA no significant extracranial stenosis.  Moderate stenosis in the distal intracranial right vertebral artery  3.  Hypertension  4.  Seizures following her stroke.    Echo 11/2021:  EF 55 to 65%, normal left atrium, mild TR normal RVSP, trivial aortic insufficiency.,  Normal mitral valve function    History of Present Illness:  Alanna Rodriges  Is a 81 y.o. female with pertinent cardiac history detailed above.  Patient has history of atrial fibrillation.  She sees cardiology at the [facility] in [place].  She has a history of a stroke in November 2021 with atrial fibrillation diagnosed at that time.  She is currently on metoprolol, Crestor twice weekly., Eliquis.  Her blood pressure and cholesterol well managed.  She did have issues with vision difficulty following her stroke.  She does continue with occupational therapy.  EKG in the office today is normal, she is in regular rhythm.  She has no additional cardiac complaints today.  She splits time between [facility] and [facility].  She had no symptoms when in atrial fibrillation.  It was diagnosed at the time of stroke      Patient Active Problem List    Diagnosis Date Noted   • Cerebrovascular accident (CVA) due to embolism of cerebral artery (HCC) 08/10/2022   • Paroxysmal atrial fibrillation (HCC) 08/10/2022   • Arthritis    • Numbness of fingers 08/06/2019   • Anxiety state 07/03/2019   • Acute non intractable tension-type headache 07/03/2019   • Asthma 07/03/2019   • Bilateral hearing loss 07/03/2019   • BMI 26.0-26.9,adult 07/03/2019   • Depression 07/03/2019   • Dysuria 07/03/2019   • GERD (gastroesophageal reflux disease) 07/03/2019   • Hypothyroidism  2019   • Irritable bowel syndrome 2019   • Medicare annual wellness visit, subsequent 2019   • Mixed hyperlipidemia 2019   • Osteoarthrosis 2019   • Disc disorder of cervical region 2019   • Vitamin D deficiency 2019   • Displacement of intervertebral disc of lumbosacral region 2018   • CTS (carpal tunnel syndrome) 2018   • Carpal tunnel syndrome of right wrist 2018     Note Last Updated: 2018     Added automatically from request for surgery [id number]     • Ulnar neuropathy at elbow of right upper extremity 2016   • Primary osteoarthritis of right hip 2016   • History of ulcerative colitis 2016   • Spondylolisthesis of lumbosacral region, L5-S1 2016   • Spondylosis of lumbar region without myelopathy or radiculopathy 2016   • Neuroforaminal stenosis of spine 2016   • Leg length discrepancy 2016   • DDD (degenerative disc disease), lumbar 2016       Allergies   Allergen Reactions   • Codeine Nausea Only   • Morphine And Related Hallucinations     And itching After 3 days       Social History     Socioeconomic History   • Marital status:    Tobacco Use   • Smoking status: Former     Packs/day: 0.50     Years: 30.00     Pack years: 15.00     Types: Cigarettes     Quit date:      Years since quittin.8   • Smokeless tobacco: Never   • Tobacco comments:     quit smoking in her 40's    Vaping Use   • Vaping Use: Never used   Substance and Sexual Activity   • Alcohol use: Yes     Alcohol/week: 14.0 standard drinks     Types: 14 Glasses of wine per week   • Drug use: No   • Sexual activity: Defer       Family History   Problem Relation Age of Onset   • Alzheimer's disease Mother    • Cancer Father    • Stroke Brother    • Cancer Maternal Grandmother    • Coronary artery disease Maternal Grandfather    • Heart disease Maternal Grandfather    • Alzheimer's disease Other    • Leukemia Other    • Breast  cancer Neg Hx    • Ovarian cancer Neg Hx        Current Medications:    Current Outpatient Medications:   •  acetaminophen (TYLENOL) 325 MG tablet, Take 325 mg by mouth Every 6 (Six) Hours As Needed., Disp: , Rfl:   •  acyclovir (ZOVIRAX) 400 MG tablet, TAKE 1 TABLET BY MOUTH  TWICE DAILY (Patient taking differently: Take 200 mg by mouth Daily.), Disp: 180 tablet, Rfl: 1  •  ALPRAZolam (XANAX) 0.5 MG tablet, Take 1 tablet by mouth 2 (Two) Times a Day As Needed for Anxiety., Disp: 30 tablet, Rfl: 2  •  apixaban (ELIQUIS) 5 MG tablet tablet, Take 1 tablet by mouth 2 (Two) Times a Day., Disp: , Rfl:   •  aspirin 81 MG EC tablet, Take 81 mg by mouth Daily. Last dose 11/28/2016, Disp: , Rfl:   •  Boswellia-Glucosamine-Vit D (OSTEO BI-FLEX ONE PER DAY PO), Take 1 tablet by mouth Daily., Disp: , Rfl:   •  clobetasol (TEMOVATE) 0.05 % cream, clobetasol 0.05 % topical cream, Disp: , Rfl:   •  dicyclomine (BENTYL) 10 MG capsule, TAKE 1 CAPSULE BY MOUTH  EVERY NIGHT (Patient taking differently: Take 1 capsule by mouth 3 (Three) Times a Week.), Disp: 90 capsule, Rfl: 1  •  gabapentin (NEURONTIN) 100 MG capsule, TAKE 1 CAPSULE BY MOUTH 3  TIMES DAILY (Patient taking differently: Take 1 capsule by mouth 3 (Three) Times a Week.), Disp: 270 capsule, Rfl: 3  •  ketoconazole (NIZORAL) 2 % cream, Apply 1 application topically Every 12 (Twelve) Hours. Shampoo weekly, Disp: , Rfl:   •  ketoconazole (NIZORAL) 2 % shampoo, Apply  topically to the appropriate area as directed 1 (One) Time Per Week., Disp: 1 each, Rfl: 3  •  levETIRAcetam (KEPPRA) 500 MG tablet, Take 1 tablet by mouth 2 (Two) Times a Day. 1/2 tab q am and 1 tab q pm, Disp: , Rfl:   •  levothyroxine (SYNTHROID, LEVOTHROID) 88 MCG tablet, TAKE 1 TABLET BY MOUTH  DAILY, Disp: 90 tablet, Rfl: 3  •  Loratadine 10 MG capsule, Take 1 capsule by mouth Daily., Disp: , Rfl:   •  magnesium oxide (MAG-OX) 400 MG tablet, Take 1 tablet by mouth 3 (Three) Times a Day., Disp: , Rfl:   •   "metoprolol succinate XL (TOPROL-XL) 25 MG 24 hr tablet, Take 1 tablet by mouth every night at bedtime., Disp: , Rfl:   •  pantoprazole (PROTONIX) 40 MG EC tablet, Take 1 tablet by mouth Daily., Disp: , Rfl:   •  rosuvastatin (Crestor) 10 MG tablet, Take 1 tablet by mouth Daily., Disp: 90 tablet, Rfl: 1     Review of Systems   Cardiovascular: Negative for chest pain, dyspnea on exertion, irregular heartbeat, leg swelling, near-syncope and orthopnea.   Neurological:        Paresthesias and muscle loss from right carpal tunnel.       Vitals:    10/25/22 1350   BP: 122/76   BP Location: Right arm   Patient Position: Sitting   Pulse: 68   SpO2: 97%   Weight: 67.3 kg (148 lb 6.4 oz)   Height: 162.6 cm (64\")       Physical Exam  Constitutional:       Appearance: Normal appearance.   Cardiovascular:      Rate and Rhythm: Normal rate and regular rhythm.      Pulses: Normal pulses.      Heart sounds: Normal heart sounds.   Pulmonary:      Effort: Pulmonary effort is normal.      Breath sounds: Normal breath sounds.   Musculoskeletal:      Right lower leg: No edema.      Left lower leg: No edema.   Neurological:      General: No focal deficit present.      Mental Status: She is alert.         Diagnostic Data:    ECG 12 Lead    Date/Time: 10/25/2022 1:55 PM  Performed by: Vipul Olivier MD  Authorized by: Vipul Olivier MD   Comparison: not compared with previous ECG   Previous ECG: no previous ECG available  Rhythm: sinus rhythm  Rate: normal  BPM: 68  QRS axis: normal    Clinical impression: normal ECG          Lab Results   Component Value Date    TRIG 65 07/12/2022     (H) 07/12/2022     Lab Results   Component Value Date    GLUCOSE 94 07/12/2022    BUN 16 07/12/2022    CREATININE 0.92 07/12/2022     07/12/2022    K 4.6 07/12/2022    CL 99 07/12/2022    CO2 24.5 07/12/2022     Lab Results   Component Value Date    HGBA1C 5.90 (H) 07/12/2022     Lab Results   Component Value Date    WBC 5.57 " 07/12/2022    HGB 14.5 07/12/2022    HCT 41.8 07/12/2022     07/12/2022       Assessment:  No diagnosis found.    Plan:      1.  Paroxysmal A. Fib  2.  Stroke related atrial fibrillation  -Stroke affected the left occipital lobe  -[id number] 6 (female, age greater than 75, hypertension, stroke  -Continue Toprol-XL and Eliquis  -Most recent LDL well controlled 59.   currently         Patient currently doing well on current medications.  We will plan to have her follow-up in the summer when she returns to [place].        Vipul Olivier MD [facility]

## 2022-10-31 ENCOUNTER — APPOINTMENT (OUTPATIENT)
Dept: MAMMOGRAPHY | Facility: HOSPITAL | Age: 82
End: 2022-10-31

## 2022-12-12 ENCOUNTER — APPOINTMENT (RX ONLY)
Dept: URBAN - METROPOLITAN AREA CLINIC 85 | Facility: CLINIC | Age: 82
Setting detail: DERMATOLOGY
End: 2022-12-12

## 2022-12-12 DIAGNOSIS — L81.0 POSTINFLAMMATORY HYPERPIGMENTATION: ICD-10-CM

## 2022-12-12 DIAGNOSIS — L57.8 OTHER SKIN CHANGES DUE TO CHRONIC EXPOSURE TO NONIONIZING RADIATION: ICD-10-CM

## 2022-12-12 DIAGNOSIS — Z85.828 PERSONAL HISTORY OF OTHER MALIGNANT NEOPLASM OF SKIN: ICD-10-CM

## 2022-12-12 DIAGNOSIS — D49.2 NEOPLASM OF UNSPECIFIED BEHAVIOR OF BONE, SOFT TISSUE, AND SKIN: ICD-10-CM

## 2022-12-12 DIAGNOSIS — L57.0 ACTINIC KERATOSIS: ICD-10-CM

## 2022-12-12 PROCEDURE — ? COUNSELING

## 2022-12-12 PROCEDURE — ? LIQUID NITROGEN

## 2022-12-12 PROCEDURE — 11102 TANGNTL BX SKIN SINGLE LES: CPT

## 2022-12-12 PROCEDURE — 99213 OFFICE O/P EST LOW 20 MIN: CPT | Mod: 25

## 2022-12-12 PROCEDURE — 17000 DESTRUCT PREMALG LESION: CPT | Mod: 59

## 2022-12-12 PROCEDURE — ? BIOPSY BY SHAVE METHOD

## 2022-12-12 ASSESSMENT — LOCATION DETAILED DESCRIPTION DERM
LOCATION DETAILED: RIGHT VENTRAL LATERAL PROXIMAL FOREARM
LOCATION DETAILED: RIGHT PROXIMAL CALF
LOCATION DETAILED: LEFT DISTAL POSTERIOR UPPER ARM
LOCATION DETAILED: RIGHT PROXIMAL PRETIBIAL REGION
LOCATION DETAILED: LEFT DISTAL PRETIBIAL REGION
LOCATION DETAILED: LEFT PROXIMAL CALF
LOCATION DETAILED: LEFT VENTRAL PROXIMAL FOREARM
LOCATION DETAILED: RIGHT DISTAL POSTERIOR UPPER ARM
LOCATION DETAILED: RIGHT LATERAL DISTAL PRETIBIAL REGION
LOCATION DETAILED: LEFT LATERAL DISTAL PRETIBIAL REGION
LOCATION DETAILED: LEFT PROXIMAL PRETIBIAL REGION

## 2022-12-12 ASSESSMENT — LOCATION SIMPLE DESCRIPTION DERM
LOCATION SIMPLE: RIGHT CALF
LOCATION SIMPLE: RIGHT PRETIBIAL REGION
LOCATION SIMPLE: LEFT PRETIBIAL REGION
LOCATION SIMPLE: RIGHT FOREARM
LOCATION SIMPLE: LEFT CALF
LOCATION SIMPLE: LEFT POSTERIOR UPPER ARM
LOCATION SIMPLE: LEFT FOREARM
LOCATION SIMPLE: RIGHT POSTERIOR UPPER ARM

## 2022-12-12 ASSESSMENT — LOCATION ZONE DERM
LOCATION ZONE: ARM
LOCATION ZONE: LEG

## 2022-12-12 NOTE — PROCEDURE: BIOPSY BY SHAVE METHOD
Detail Level: Detailed
Depth Of Biopsy: dermis
Was A Bandage Applied: Yes
Size Of Lesion In Cm: 0
Biopsy Type: H and E
Biopsy Method: Personna blade
Anesthesia Type: 1% lidocaine with epinephrine
Anesthesia Volume In Cc (Will Not Render If 0): 0.2
Hemostasis: Electrocautery and Aluminum Chloride
Wound Care: Aquaphor
Dressing: bandage
Destruction After The Procedure: No
Type Of Destruction Used: Curettage
Curettage Text: The wound bed was treated with curettage after the biopsy was performed.
Cryotherapy Text: The wound bed was treated with cryotherapy after the biopsy was performed.
Electrodesiccation Text: The wound bed was treated with electrodesiccation after the biopsy was performed.
Electrodesiccation And Curettage Text: The wound bed was treated with electrodesiccation and curettage after the biopsy was performed.
Silver Nitrate Text: The wound bed was treated with silver nitrate after the biopsy was performed.
Lab: -[id number]
Consent: Written consent was obtained and risks were reviewed including but not limited to scarring, infection, bleeding, scabbing, incomplete removal, nerve damage and allergy to anesthesia.
Post-Care Instructions: I reviewed with the patient in detail post-care instructions. Patient is to keep the biopsy site dry overnight, and then apply bacitracin twice daily until healed. Patient may apply hydrogen peroxide soaks to remove any crusting.
Notification Instructions: Patient will be notified of biopsy results. However, patient instructed to call the office if not contacted within 2 weeks.
Billing Type: [employer]
Information: Selecting Yes will display possible errors in your note based on the variables you have selected. This validation is only offered as a suggestion for you. PLEASE NOTE THAT THE VALIDATION TEXT WILL BE REMOVED WHEN YOU FINALIZE YOUR NOTE. IF YOU WANT TO FAX A PRELIMINARY NOTE YOU WILL NEED TO TOGGLE THIS TO 'NO' IF YOU DO NOT WANT IT IN YOUR FAXED NOTE.

## 2023-01-03 ENCOUNTER — RX ONLY (OUTPATIENT)
Age: 83
Setting detail: RX ONLY
End: 2023-01-03

## 2023-01-03 RX ORDER — CLOBETASOL PROPIONATE 0.5 MG/G
CREAM TOPICAL
Qty: 60 | Refills: 0 | Status: ERX

## 2023-01-03 RX ORDER — CLOBETASOL PROPIONATE 0.5 MG/G
CREAM TOPICAL
Qty: 60 | Refills: 0 | Status: CANCELLED | COMMUNITY
Start: 2023-01-03

## 2023-01-16 RX ORDER — ROSUVASTATIN CALCIUM 10 MG/1
10 TABLET, COATED ORAL DAILY
Qty: 90 TABLET | Refills: 1 | Status: SHIPPED | OUTPATIENT
Start: 2023-01-16

## 2023-01-16 NOTE — TELEPHONE ENCOUNTER
Rx Refill Note  Requested Prescriptions     Pending Prescriptions Disp Refills   • rosuvastatin (Crestor) 10 MG tablet 90 tablet 1     Sig: Take 1 tablet by mouth Daily.      Last office visit with prescribing clinician: 09/15/22  Next office visit with prescribing clinician: 8/16/2023                         Would you like a call back once the refill request has been completed: [] Yes [] No    If the office needs to give you a call back, can they leave a voicemail: [] Yes [] No    Kim Garner LPN  01/16/23, 16:01 EST

## 2023-02-28 ENCOUNTER — APPOINTMENT (RX ONLY)
Dept: URBAN - METROPOLITAN AREA CLINIC 85 | Facility: CLINIC | Age: 83
Setting detail: DERMATOLOGY
End: 2023-02-28

## 2023-02-28 DIAGNOSIS — D49.2 NEOPLASM OF UNSPECIFIED BEHAVIOR OF BONE, SOFT TISSUE, AND SKIN: ICD-10-CM

## 2023-02-28 PROCEDURE — ? BIOPSY BY SHAVE METHOD

## 2023-02-28 PROCEDURE — 11102 TANGNTL BX SKIN SINGLE LES: CPT

## 2023-02-28 PROCEDURE — ? COUNSELING

## 2023-02-28 PROCEDURE — 11103 TANGNTL BX SKIN EA SEP/ADDL: CPT

## 2023-02-28 ASSESSMENT — LOCATION SIMPLE DESCRIPTION DERM
LOCATION SIMPLE: RIGHT PRETIBIAL REGION
LOCATION SIMPLE: LEFT PRETIBIAL REGION

## 2023-02-28 ASSESSMENT — LOCATION ZONE DERM: LOCATION ZONE: LEG

## 2023-02-28 ASSESSMENT — LOCATION DETAILED DESCRIPTION DERM
LOCATION DETAILED: LEFT LATERAL PROXIMAL PRETIBIAL REGION
LOCATION DETAILED: RIGHT PROXIMAL PRETIBIAL REGION

## 2023-02-28 NOTE — PROCEDURE: COUNSELING
Detail Level: Detailed
Patient Specific Counseling (Will Not Stick From Patient To Patient): This lesion was biopsied in the past and showed lichenoid keratosis.  Feel lesion may have changed since it looks more suspicious at this time so rebiopsy indicated

## 2023-02-28 NOTE — PROCEDURE: BIOPSY BY SHAVE METHOD
Detail Level: Detailed
Depth Of Biopsy: dermis
Was A Bandage Applied: Yes
Size Of Lesion In Cm: 0
Biopsy Type: H and E
Biopsy Method: Dermablade
Anesthesia Type: 1% lidocaine with epinephrine
Anesthesia Volume In Cc (Will Not Render If 0): 0.5
Hemostasis: Drysol
Wound Care: Petrolatum
Dressing: bandage
Destruction After The Procedure: No
Type Of Destruction Used: Curettage
Curettage Text: The wound bed was treated with curettage after the biopsy was performed.
Cryotherapy Text: The wound bed was treated with cryotherapy after the biopsy was performed.
Electrodesiccation Text: The wound bed was treated with electrodesiccation after the biopsy was performed.
Electrodesiccation And Curettage Text: The wound bed was treated with electrodesiccation and curettage after the biopsy was performed.
Silver Nitrate Text: The wound bed was treated with silver nitrate after the biopsy was performed.
Lab: -[id number]
Consent: Written consent was obtained and risks were reviewed including but not limited to scarring, infection, bleeding, scabbing, incomplete removal, nerve damage and allergy to anesthesia.
Post-Care Instructions: I reviewed with the patient in detail post-care instructions. Patient is to keep the biopsy site dry overnight, and then apply bacitracin twice daily until healed. Patient may apply hydrogen peroxide soaks to remove any crusting.
Notification Instructions: Patient will be notified of biopsy results. However, patient instructed to call the office if not contacted within 2 weeks.
Billing Type: [employer]
Information: Selecting Yes will display possible errors in your note based on the variables you have selected. This validation is only offered as a suggestion for you. PLEASE NOTE THAT THE VALIDATION TEXT WILL BE REMOVED WHEN YOU FINALIZE YOUR NOTE. IF YOU WANT TO FAX A PRELIMINARY NOTE YOU WILL NEED TO TOGGLE THIS TO 'NO' IF YOU DO NOT WANT IT IN YOUR FAXED NOTE.
Lab: -[id number]
Billing Type: Third-Party Bill

## 2023-04-06 ENCOUNTER — APPOINTMENT (RX ONLY)
Dept: URBAN - METROPOLITAN AREA CLINIC 85 | Facility: CLINIC | Age: 83
Setting detail: DERMATOLOGY
End: 2023-04-06

## 2023-04-06 DIAGNOSIS — D49.2 NEOPLASM OF UNSPECIFIED BEHAVIOR OF BONE, SOFT TISSUE, AND SKIN: ICD-10-CM

## 2023-04-06 PROCEDURE — ? BIOPSY BY SHAVE METHOD

## 2023-04-06 PROCEDURE — 11102 TANGNTL BX SKIN SINGLE LES: CPT

## 2023-04-06 ASSESSMENT — LOCATION DETAILED DESCRIPTION DERM: LOCATION DETAILED: LEFT DISTAL PRETIBIAL REGION

## 2023-04-06 ASSESSMENT — LOCATION SIMPLE DESCRIPTION DERM: LOCATION SIMPLE: LEFT PRETIBIAL REGION

## 2023-04-06 ASSESSMENT — LOCATION ZONE DERM: LOCATION ZONE: LEG

## 2023-04-06 NOTE — PROCEDURE: BIOPSY BY SHAVE METHOD
Body Location Override (Optional - Billing Will Still Be Based On Selected Body Map Location If Applicable): left distal pretibial region
Detail Level: Detailed
Depth Of Biopsy: dermis
Was A Bandage Applied: Yes
Size Of Lesion In Cm: 0.7
X Size Of Lesion In Cm: 0
Biopsy Type: H and E
Biopsy Method: Personna blade
Anesthesia Type: bacteriostatic saline
Anesthesia Volume In Cc (Will Not Render If 0): 0.3
Hemostasis: Electrocautery
Wound Care: Aquaphor
Dressing: bandage
Destruction After The Procedure: No
Type Of Destruction Used: Curettage
Curettage Text: The wound bed was treated with curettage after the biopsy was performed.
Cryotherapy Text: The wound bed was treated with cryotherapy after the biopsy was performed.
Electrodesiccation Text: The wound bed was treated with electrodesiccation after the biopsy was performed.
Electrodesiccation And Curettage Text: The wound bed was treated with electrodesiccation and curettage after the biopsy was performed.
Silver Nitrate Text: The wound bed was treated with silver nitrate after the biopsy was performed.
Lab: -[id number]
Path Notes (To The Dermatopathologist): Check margins
Consent: Written consent was obtained and risks were reviewed including but not limited to scarring, infection, bleeding, scabbing, incomplete removal, nerve damage and allergy to anesthesia.
Post-Care Instructions: I reviewed with the patient in detail post-care instructions. Patient is to keep the biopsy site covered with Vaseline and bandage until healed.
Notification Instructions: Patient will be notified of biopsy results. However, patient instructed to call the office if not contacted within 2 weeks.
Billing Type: [employer]
Information: Selecting Yes will display possible errors in your note based on the variables you have selected. This validation is only offered as a suggestion for you. PLEASE NOTE THAT THE VALIDATION TEXT WILL BE REMOVED WHEN YOU FINALIZE YOUR NOTE. IF YOU WANT TO FAX A PRELIMINARY NOTE YOU WILL NEED TO TOGGLE THIS TO 'NO' IF YOU DO NOT WANT IT IN YOUR FAXED NOTE.

## 2023-05-15 ENCOUNTER — TELEPHONE (OUTPATIENT)
Dept: INTERNAL MEDICINE | Facility: CLINIC | Age: 83
End: 2023-05-15

## 2023-05-15 NOTE — TELEPHONE ENCOUNTER
Caller: Alanna Rodriges    Relationship: Self    Best call back number: [phone]    What orders are you requesting (i.e. lab or imaging): ORDER FOR URINALYSIS    In what timeframe would the patient need to come in: ASAP    Where will you receive your lab/imaging services: IN OFFICE    Additional notes:

## 2023-05-16 ENCOUNTER — HOSPITAL ENCOUNTER (OUTPATIENT)
Dept: MAMMOGRAPHY | Facility: HOSPITAL | Age: 83
Discharge: HOME | End: 2023-05-16
Admitting: INTERNAL MEDICINE
Payer: MEDICARE

## 2023-05-16 ENCOUNTER — OFFICE VISIT (OUTPATIENT)
Dept: INTERNAL MEDICINE | Facility: CLINIC | Age: 83
End: 2023-05-16
Payer: MEDICARE

## 2023-05-16 VITALS
TEMPERATURE: 97.7 F | BODY MASS INDEX: 24.31 KG/M2 | WEIGHT: 142.4 LBS | DIASTOLIC BLOOD PRESSURE: 68 MMHG | HEART RATE: 72 BPM | SYSTOLIC BLOOD PRESSURE: 126 MMHG | HEIGHT: 64 IN

## 2023-05-16 DIAGNOSIS — M47.816 SPONDYLOSIS OF LUMBAR REGION WITHOUT MYELOPATHY OR RADICULOPATHY: ICD-10-CM

## 2023-05-16 DIAGNOSIS — R30.0 DYSURIA: Primary | ICD-10-CM

## 2023-05-16 DIAGNOSIS — N30.01 ACUTE CYSTITIS WITH HEMATURIA: ICD-10-CM

## 2023-05-16 DIAGNOSIS — Z12.31 VISIT FOR SCREENING MAMMOGRAM: ICD-10-CM

## 2023-05-16 DIAGNOSIS — R29.898 RUE WEAKNESS: ICD-10-CM

## 2023-05-16 LAB
BILIRUB BLD-MCNC: NEGATIVE MG/DL
CLARITY, POC: ABNORMAL
COLOR UR: YELLOW
EXPIRATION DATE: ABNORMAL
GLUCOSE UR STRIP-MCNC: NEGATIVE MG/DL
KETONES UR QL: NEGATIVE
LEUKOCYTE EST, POC: ABNORMAL
Lab: ABNORMAL
NITRITE UR-MCNC: POSITIVE MG/ML
PH UR: 6 [PH] (ref 5–8)
PROT UR STRIP-MCNC: ABNORMAL MG/DL
RBC # UR STRIP: ABNORMAL /UL
SP GR UR: 1.02 (ref 1–1.03)
UROBILINOGEN UR QL: ABNORMAL

## 2023-05-16 PROCEDURE — 77063 BREAST TOMOSYNTHESIS BI: CPT

## 2023-05-16 PROCEDURE — 81003 URINALYSIS AUTO W/O SCOPE: CPT | Performed by: INTERNAL MEDICINE

## 2023-05-16 PROCEDURE — 1160F RVW MEDS BY RX/DR IN RCRD: CPT | Performed by: INTERNAL MEDICINE

## 2023-05-16 PROCEDURE — 1159F MED LIST DOCD IN RCRD: CPT | Performed by: INTERNAL MEDICINE

## 2023-05-16 PROCEDURE — 87086 URINE CULTURE/COLONY COUNT: CPT | Performed by: INTERNAL MEDICINE

## 2023-05-16 PROCEDURE — 99213 OFFICE O/P EST LOW 20 MIN: CPT | Performed by: INTERNAL MEDICINE

## 2023-05-16 PROCEDURE — 77067 SCR MAMMO BI INCL CAD: CPT

## 2023-05-16 PROCEDURE — 87186 SC STD MICRODIL/AGAR DIL: CPT | Performed by: INTERNAL MEDICINE

## 2023-05-16 PROCEDURE — 87077 CULTURE AEROBIC IDENTIFY: CPT | Performed by: INTERNAL MEDICINE

## 2023-05-16 RX ORDER — SULFAMETHOXAZOLE AND TRIMETHOPRIM 800; 160 MG/1; MG/1
1 TABLET ORAL 2 TIMES DAILY
Qty: 10 TABLET | Refills: 0 | Status: SHIPPED | OUTPATIENT
Start: 2023-05-16

## 2023-05-16 RX ORDER — APIXABAN 5 MG/1
1 TABLET, FILM COATED ORAL DAILY
COMMUNITY
Start: 2023-04-26

## 2023-05-16 NOTE — PROGRESS NOTES
"[facility]     Alanna Rodriges  1940   [id number]      Patient Care Team:  Ismael Jones MD as PCP - General  Romaine Tanner MD as Consulting Physician (Pain Medicine)  Sheriec Carrington PA-C as Physician Assistant (Neurosurgery)  Jose Alberto Flores MD as Consulting Physician (Dermatology)  Edmond Hernandez OD (Optometry)  Vipul Olivier MD as Consulting Physician (Cardiology)  Ismael Jones MD as Consulting Physician (Internal Medicine)    Chief Complaint::   Chief Complaint   Patient presents with   • Difficulty Urinating   • Urinary Frequency        HPI  The patient presents today complaining of dysuria.    Dysuria  The patient states that she feels like she has to urinate frequently. When she urinates and the flow ends, she still feels like she needs to urinate. She notes that the pain with urination goes all the way through her body \"like a bad orgasm\". She confirms she has used Bactrim in the past.    Adverse reaction  She notes she had an adverse reaction to an antibiotic in the past while she was on birth control pills.     Allergies  She confirms she is allergic to CODEINE and MORPHINE.     Chronic Conditions:      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 or radiculopathy   • Neuroforaminal stenosis of spine   • Leg length discrepancy   • Ulnar neuropathy at elbow of right upper extremity   • CTS (carpal tunnel syndrome)   • Carpal tunnel syndrome of right wrist   • Displacement of intervertebral disc of lumbosacral region   • Anxiety state   • Acute non intractable tension-type headache   • Asthma   • Bilateral hearing loss   • BMI 26.0-26.9,adult   • Depression   • Dysuria   • GERD (gastroesophageal reflux disease)   • Hypothyroidism "   • Irritable bowel syndrome   • Medicare annual wellness visit, subsequent   • Mixed hyperlipidemia   • Osteoarthrosis   • Disc disorder of cervical region   • Vitamin D deficiency   • Numbness of fingers   • Arthritis   • Cerebrovascular accident (CVA) due to embolism of cerebral artery   • Paroxysmal atrial fibrillation        Past Medical History:   Diagnosis Date   • Anemia    • Anxiety disorder    • Arthritis    • Asthma    • Cataract    • Cirrhosis    • Claustrophobia    • Colitis    • COVID 09/2022   • Depression    • Dermatitis    • DVT (deep venous thrombosis)     1975   • Elbow pain    • GERD (gastroesophageal reflux disease)    • History of blood transfusion    • History of hepatitis C virus infection    • Hypothyroidism    • Kidney stones    • Migraine     occular   • PONV (postoperative nausea and vomiting)    • Ptosis due to aging    • Seborrhea    • Seizure 1975    during hospitalization as a side effect of Tofranil    • Shoulder pain     right   • Ulcerative colitis    • Ulnar nerve compression, right        Past Surgical History:   Procedure Laterality Date   • BUNIONECTOMY Bilateral    • CARPAL TUNNEL RELEASE Right 1/5/2018    Procedure: CARPAL TUNNEL RELEASE RIGHT ;  Surgeon: Edmond Mccrary MD;  Location:  [facility];  Service:    • CARPAL TUNNEL RELEASE Right 2017   • CATARACT EXTRACTION, BILATERAL Bilateral 12/2020    in [place]   • COLECTOMY TOTAL     • COLON SURGERY  1975    resection   • D & C AND LAPAROSCOPY     • FACELIFT  01/2015    chemical peel   • HYSTERECTOMY  1994    bso   • ILEOSTOMY     • OOPHORECTOMY  1994   • SKIN BIOPSY     • ULNAR NERVE DECOMPRESSION Right 12/9/2016    Procedure: RIGHT ULNAR NERVE DECOMPRESSION;  Surgeon: Edmond Mccrary MD;  Location:  [facility];  Service:    • URETHRAL DILATION      multiple       Family History   Problem Relation Age of Onset   • Alzheimer's disease Mother    • Cancer Father    • Stroke Brother    • Cancer Maternal Grandmother    •  Coronary artery disease Maternal Grandfather    • Heart disease Maternal Grandfather    • Alzheimer's disease Other    • Leukemia Other    • Breast cancer Neg Hx    • Ovarian cancer Neg Hx        Social History     Socioeconomic History   • Marital status:    Tobacco Use   • Smoking status: Former     Packs/day: 0.50     Years: 30.00     Pack years: 15.00     Types: Cigarettes     Quit date:      Years since quittin.4   • Smokeless tobacco: Never   • Tobacco comments:     quit smoking in her 40's    Vaping Use   • Vaping Use: Never used   Substance and Sexual Activity   • Alcohol use: Yes     Alcohol/week: 14.0 standard drinks     Types: 14 Glasses of wine per week   • Drug use: No   • Sexual activity: Defer       Allergies   Allergen Reactions   • Codeine Nausea Only   • Morphine And Related Hallucinations     And itching After 3 days         Current Outpatient Medications:   •  acetaminophen (TYLENOL) 325 MG tablet, Take 1 tablet by mouth Every 6 (Six) Hours As Needed., Disp: , Rfl:   •  acyclovir (ZOVIRAX) 400 MG tablet, TAKE 1 TABLET BY MOUTH  TWICE DAILY (Patient taking differently: Take 200 mg by mouth Daily.), Disp: 180 tablet, Rfl: 1  •  ALPRAZolam (XANAX) 0.5 MG tablet, Take 1 tablet by mouth 2 (Two) Times a Day As Needed for Anxiety., Disp: 30 tablet, Rfl: 2  •  aspirin 81 MG EC tablet, Take 1 tablet by mouth Daily. Last dose 2016, Disp: , Rfl:   •  Boswellia-Glucosamine-Vit D (OSTEO BI-FLEX ONE PER DAY PO), Take 1 tablet by mouth Daily., Disp: , Rfl:   •  clobetasol (TEMOVATE) 0.05 % cream, clobetasol 0.05 % topical cream, Disp: , Rfl:   •  dicyclomine (BENTYL) 10 MG capsule, TAKE 1 CAPSULE BY MOUTH  EVERY NIGHT, Disp: 90 capsule, Rfl: 1  •  Eliquis 5 MG tablet tablet, Take 1 tablet by mouth Daily., Disp: , Rfl:   •  ketoconazole (NIZORAL) 2 % cream, Apply 1 application topically to the appropriate area as directed Every 12 (Twelve) Hours. Shampoo weekly, Disp: , Rfl:   •   "ketoconazole (NIZORAL) 2 % shampoo, Apply  topically to the appropriate area as directed 1 (One) Time Per Week., Disp: 1 each, Rfl: 3  •  levETIRAcetam (KEPPRA) 500 MG tablet, Take 1 tablet by mouth 2 (Two) Times a Day. 1/2 tab q am and 1 tab q pm, Disp: , Rfl:   •  levothyroxine (SYNTHROID, LEVOTHROID) 88 MCG tablet, TAKE 1 TABLET BY MOUTH  DAILY, Disp: 90 tablet, Rfl: 3  •  Loratadine 10 MG capsule, Take 1 capsule by mouth Daily., Disp: , Rfl:   •  magnesium oxide (MAG-OX) 400 MG tablet, Take 1 tablet by mouth 3 (Three) Times a Day., Disp: , Rfl:   •  metoprolol succinate XL (TOPROL-XL) 25 MG 24 hr tablet, Take 1 tablet by mouth every night at bedtime., Disp: , Rfl:   •  pantoprazole (PROTONIX) 40 MG EC tablet, Take 1 tablet by mouth Daily., Disp: , Rfl:   •  rosuvastatin (Crestor) 10 MG tablet, Take 1 tablet by mouth Daily. (Patient taking differently: Take 1 tablet by mouth 3 (Three) Times a Week.), Disp: 90 tablet, Rfl: 1  •  gabapentin (NEURONTIN) 100 MG capsule, TAKE 1 CAPSULE BY MOUTH 3  TIMES DAILY (Patient taking differently: Take 1 capsule by mouth 3 (Three) Times a Week.), Disp: 270 capsule, Rfl: 3  •  sulfamethoxazole-trimethoprim (BACTRIM DS,SEPTRA DS) 800-160 MG per tablet, Take 1 tablet by mouth 2 (Two) Times a Day., Disp: 10 tablet, Rfl: 0    Review of Systems   Genitourinary: Positive for dysuria and frequency.   Remarkable for persistent discomfort, and sometimes weakness, radiating into the right arm as well as paresthesia in the right lower extremity at night.    Vital Signs  Vitals:    05/16/23 1009   BP: 126/68   BP Location: Left arm   Patient Position: Sitting   Cuff Size: Adult   Pulse: 72   Temp: 97.7 °F (36.5 °C)   Weight: 64.6 kg (142 lb 6.4 oz)   Height: 162.6 cm (64.02\")   PainSc:   3   PainLoc: Back       Physical Exam  Constitutional:       General: She is not in acute distress.  HENT:      Head: Normocephalic and atraumatic.   Pulmonary:      Effort: Pulmonary effort is normal. No " respiratory distress.   Neurological:      General: No focal deficit present.      Mental Status: She is alert and oriented to person, place, and time.      Cranial Nerves: No cranial nerve deficit.        Physical examination is normal.    Procedures    ACE III MINI             Assessment/Plan:    Diagnoses and all orders for this visit:    1. Dysuria (Primary)  -     POC Urinalysis Dipstick, Automated    2. RUE weakness  -     Ambulatory Referral to Neurology    3. Acute cystitis with hematuria  -     Urine Culture - Urine, Urine, Clean Catch; Future  -     Urine Culture - Urine, Urine, Clean Catch    4. Spondylosis of lumbar region without myelopathy or radiculopathy    Other orders  -     sulfamethoxazole-trimethoprim (BACTRIM DS,SEPTRA DS) 800-160 MG per tablet; Take 1 tablet by mouth 2 (Two) Times a Day.  Dispense: 10 tablet; Refill: 0        1. Urinary tract infection  - Bactrim DS twice a day for 5 days.    2. Right upper extremity weakness  - She is referred to neurology. I think she needs EMG/NCV per her request, Dr. Shields.    3. Back pain  - She has known lumbar spondylosis and has been to pain management without much improvement. Should this progress, would consider a trial of pain modulating medication such as gabapentin, pregabalin, or nortriptyline. The other option would be physical therapy or pain management.    Plan of care reviewed with patient at the conclusion of today's visit. Education was provided regarding diagnosis, management, and any prescribed or recommended OTC medications.Patient verbalizes understanding of and agreement with management plan.      Transcribed from ambient dictation for Ismael Jones MD by Latoya Riso.  05/16/23   14:07 EDT    Patient or patient representative verbalized consent to the visit recording.  I have personally performed the services described in this document as transcribed by the above individual, and it is both accurate and complete.      Ismael  JOANN Jones MD

## 2023-05-18 LAB — BACTERIA SPEC AEROBE CULT: ABNORMAL

## 2023-05-23 ENCOUNTER — OFFICE VISIT (OUTPATIENT)
Dept: INTERNAL MEDICINE | Facility: CLINIC | Age: 83
End: 2023-05-23
Payer: MEDICARE

## 2023-05-23 VITALS
HEART RATE: 84 BPM | DIASTOLIC BLOOD PRESSURE: 62 MMHG | HEIGHT: 64 IN | WEIGHT: 142.8 LBS | SYSTOLIC BLOOD PRESSURE: 136 MMHG | BODY MASS INDEX: 24.38 KG/M2 | TEMPERATURE: 98 F

## 2023-05-23 DIAGNOSIS — H61.23 BILATERAL IMPACTED CERUMEN: Primary | ICD-10-CM

## 2023-05-23 PROCEDURE — 1159F MED LIST DOCD IN RCRD: CPT | Performed by: INTERNAL MEDICINE

## 2023-05-23 PROCEDURE — 1160F RVW MEDS BY RX/DR IN RCRD: CPT | Performed by: INTERNAL MEDICINE

## 2023-05-23 PROCEDURE — 99212 OFFICE O/P EST SF 10 MIN: CPT | Performed by: INTERNAL MEDICINE

## 2023-05-23 NOTE — PROGRESS NOTES
[facility]     Alanna Rodriges  1940   [id number]      Patient Care Team:  Ismael Jones MD as PCP - General  Romaine Tanner MD as Consulting Physician (Pain Medicine)  Sheriec Carrington PA-C as Physician Assistant (Neurosurgery)  Jose Alberto Flores MD as Consulting Physician (Dermatology)  Edmond Hernandez OD (Optometry)  Vipul Olivier MD as Consulting Physician (Cardiology)  Ismael Jones MD as Consulting Physician (Internal Medicine)    Chief Complaint::   Chief Complaint   Patient presents with   • Hearing Loss     Worse on left        HPI  The patient comes in complaining of hearing loss.    Hearing loss  The patient states that she thinks she has impacted wax every 4 to 5 years. She states that all of a sudden she feels pressure and she can not hear as well. She states that it is nothing she can get out on her own. She states that she saw Dr. Galvez and he is going to do a new nerve conduction test on her entire right side. She states that he thinks that it is possible brachial nerves also contributing to this. She states that she has 2 MRIs.    Chronic Conditions:      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 or radiculopathy   • Neuroforaminal stenosis of spine   • Leg length discrepancy   • Ulnar neuropathy at elbow of right upper extremity   • CTS (carpal tunnel syndrome)   • Carpal tunnel syndrome of right wrist   • Displacement of intervertebral disc of lumbosacral region   • Anxiety state   • Acute non intractable tension-type headache   • Asthma   • Bilateral hearing loss   • BMI 26.0-26.9,adult   • Depression   • Dysuria   • GERD (gastroesophageal reflux disease)   • Hypothyroidism   • Irritable bowel syndrome   • Medicare  annual wellness visit, subsequent   • Mixed hyperlipidemia   • Osteoarthrosis   • Disc disorder of cervical region   • Vitamin D deficiency   • Numbness of fingers   • Arthritis   • Cerebrovascular accident (CVA) due to embolism of cerebral artery   • Paroxysmal atrial fibrillation        Past Medical History:   Diagnosis Date   • Anemia    • Anxiety disorder    • Arthritis    • Asthma    • Cataract    • Cirrhosis    • Claustrophobia    • Colitis    • COVID 09/2022   • Depression    • Dermatitis    • DVT (deep venous thrombosis)     1975   • Elbow pain    • GERD (gastroesophageal reflux disease)    • History of blood transfusion    • History of hepatitis C virus infection    • Hypothyroidism    • Kidney stones    • Migraine     occular   • PONV (postoperative nausea and vomiting)    • Ptosis due to aging    • Seborrhea    • Seizure 1975    during hospitalization as a side effect of Tofranil    • Shoulder pain     right   • Ulcerative colitis    • Ulnar nerve compression, right        Past Surgical History:   Procedure Laterality Date   • BUNIONECTOMY Bilateral    • CARPAL TUNNEL RELEASE Right 1/5/2018    Procedure: CARPAL TUNNEL RELEASE RIGHT ;  Surgeon: Edmond Mccrary MD;  Location:  [facility];  Service:    • CARPAL TUNNEL RELEASE Right 2017   • CATARACT EXTRACTION, BILATERAL Bilateral 12/2020    in [place]   • COLECTOMY TOTAL     • COLON SURGERY  1975    resection   • D & C AND LAPAROSCOPY     • FACELIFT  01/2015    chemical peel   • HYSTERECTOMY  1994    bso   • ILEOSTOMY     • OOPHORECTOMY  1994   • SKIN BIOPSY     • ULNAR NERVE DECOMPRESSION Right 12/9/2016    Procedure: RIGHT ULNAR NERVE DECOMPRESSION;  Surgeon: Edmond Mccrary MD;  Location:  [facility];  Service:    • URETHRAL DILATION      multiple       Family History   Problem Relation Age of Onset   • Alzheimer's disease Mother    • Cancer Father    • Stroke Brother    • Cancer Maternal Grandmother    • Coronary artery disease Maternal Grandfather    •  Heart disease Maternal Grandfather    • Alzheimer's disease Other    • Leukemia Other    • Breast cancer Neg Hx    • Ovarian cancer Neg Hx        Social History     Socioeconomic History   • Marital status:    Tobacco Use   • Smoking status: Former     Packs/day: 0.50     Years: 30.00     Pack years: 15.00     Types: Cigarettes     Quit date:      Years since quittin.4   • Smokeless tobacco: Never   • Tobacco comments:     quit smoking in her 40's    Vaping Use   • Vaping Use: Never used   Substance and Sexual Activity   • Alcohol use: Yes     Alcohol/week: 14.0 standard drinks     Types: 14 Glasses of wine per week   • Drug use: No   • Sexual activity: Defer       Allergies   Allergen Reactions   • Codeine Nausea Only   • Morphine And Related Hallucinations     And itching After 3 days         Current Outpatient Medications:   •  acetaminophen (TYLENOL) 325 MG tablet, Take 1 tablet by mouth Every 6 (Six) Hours As Needed., Disp: , Rfl:   •  acyclovir (ZOVIRAX) 400 MG tablet, TAKE 1 TABLET BY MOUTH  TWICE DAILY (Patient taking differently: Take 200 mg by mouth Daily.), Disp: 180 tablet, Rfl: 1  •  ALPRAZolam (XANAX) 0.5 MG tablet, Take 1 tablet by mouth 2 (Two) Times a Day As Needed for Anxiety., Disp: 30 tablet, Rfl: 2  •  aspirin 81 MG EC tablet, Take 1 tablet by mouth Daily. Last dose 2016, Disp: , Rfl:   •  Boswellia-Glucosamine-Vit D (OSTEO BI-FLEX ONE PER DAY PO), Take 1 tablet by mouth Daily., Disp: , Rfl:   •  clobetasol (TEMOVATE) 0.05 % cream, clobetasol 0.05 % topical cream, Disp: , Rfl:   •  dicyclomine (BENTYL) 10 MG capsule, TAKE 1 CAPSULE BY MOUTH  EVERY NIGHT, Disp: 90 capsule, Rfl: 1  •  Eliquis 5 MG tablet tablet, Take 1 tablet by mouth Daily., Disp: , Rfl:   •  gabapentin (NEURONTIN) 100 MG capsule, TAKE 1 CAPSULE BY MOUTH 3  TIMES DAILY (Patient taking differently: Take 3 capsules by mouth Daily.), Disp: 270 capsule, Rfl: 3  •  ketoconazole (NIZORAL) 2 % cream, Apply 1  "application topically to the appropriate area as directed Every 12 (Twelve) Hours. Shampoo weekly, Disp: , Rfl:   •  ketoconazole (NIZORAL) 2 % shampoo, Apply  topically to the appropriate area as directed 1 (One) Time Per Week., Disp: 1 each, Rfl: 3  •  levETIRAcetam (KEPPRA) 500 MG tablet, Take 1 tablet by mouth 2 (Two) Times a Day. 1/2 tab q am and 1 tab q pm, Disp: , Rfl:   •  levothyroxine (SYNTHROID, LEVOTHROID) 88 MCG tablet, TAKE 1 TABLET BY MOUTH  DAILY, Disp: 90 tablet, Rfl: 3  •  Loratadine 10 MG capsule, Take 1 capsule by mouth Daily., Disp: , Rfl:   •  magnesium oxide (MAG-OX) 400 MG tablet, Take 1 tablet by mouth 3 (Three) Times a Day., Disp: , Rfl:   •  metoprolol succinate XL (TOPROL-XL) 25 MG 24 hr tablet, Take 1 tablet by mouth every night at bedtime., Disp: , Rfl:   •  pantoprazole (PROTONIX) 40 MG EC tablet, Take 1 tablet by mouth Daily., Disp: , Rfl:   •  rosuvastatin (Crestor) 10 MG tablet, Take 1 tablet by mouth Daily. (Patient taking differently: Take 1 tablet by mouth 3 (Three) Times a Week.), Disp: 90 tablet, Rfl: 1  •  sulfamethoxazole-trimethoprim (BACTRIM DS,SEPTRA DS) 800-160 MG per tablet, Take 1 tablet by mouth 2 (Two) Times a Day. (Patient not taking: Reported on 5/23/2023), Disp: 10 tablet, Rfl: 0    Review of Systems   Review of systems is otherwise unremarkable.    Vital Signs  Vitals:    05/23/23 1114   BP: 136/62   BP Location: Left arm   Patient Position: Sitting   Cuff Size: Adult   Pulse: 84   Temp: 98 °F (36.7 °C)   Weight: 64.8 kg (142 lb 12.8 oz)   Height: 162.6 cm (64.02\")   PainSc: 0-No pain       Physical Exam   She has bilateral wax impaction in both external canals.    Procedures    ACE III MINI             Assessment/Plan:      1. Hearing loss secondary to cerumen impaction  - Wax is removed via irrigation.    Follow up as previously scheduled.    Diagnoses and all orders for this visit:    1. Bilateral impacted cerumen (Primary)  -     Ambulatory Referral to ENT " (Otolaryngology)          Plan of care reviewed with patient at the conclusion of today's visit. Education was provided regarding diagnosis, management, and any prescribed or recommended OTC medications.Patient verbalizes understanding of and agreement with management plan.         Ismael Jones MD        Transcribed from ambient dictation for Ismael Jones MD by Audrey Hendrix.  05/23/23   13:21 EDT    Patient or patient representative verbalized consent to the visit recording.  I have personally performed the services described in this document as transcribed by the above individual, and it is both accurate and complete.

## 2023-07-31 RX ORDER — LEVOTHYROXINE SODIUM 88 UG/1
88 TABLET ORAL DAILY
Qty: 90 TABLET | Refills: 3 | Status: SHIPPED | OUTPATIENT
Start: 2023-07-31

## 2023-08-01 ENCOUNTER — TELEPHONE (OUTPATIENT)
Dept: INTERNAL MEDICINE | Facility: CLINIC | Age: 83
End: 2023-08-01
Payer: MEDICARE

## 2023-08-01 DIAGNOSIS — H91.93 BILATERAL HEARING LOSS, UNSPECIFIED HEARING LOSS TYPE: Primary | ICD-10-CM

## 2023-08-07 ENCOUNTER — TELEPHONE (OUTPATIENT)
Dept: INTERNAL MEDICINE | Facility: CLINIC | Age: 83
End: 2023-08-07

## 2023-08-07 DIAGNOSIS — E78.2 MIXED HYPERLIPIDEMIA: Primary | ICD-10-CM

## 2023-08-07 DIAGNOSIS — E55.9 VITAMIN D DEFICIENCY: ICD-10-CM

## 2023-08-07 DIAGNOSIS — E03.9 ACQUIRED HYPOTHYROIDISM: ICD-10-CM

## 2023-08-07 DIAGNOSIS — R53.83 OTHER FATIGUE: ICD-10-CM

## 2023-08-07 DIAGNOSIS — R73.09 ABNORMAL GLUCOSE: ICD-10-CM

## 2023-08-07 NOTE — TELEPHONE ENCOUNTER
Caller: Alanna Rodriges    Relationship: Self    Best call back number: [phone]     What orders are you requesting (i.e. lab or imaging): FASTING LAB ORDERS FOR PHYSICAL ON WEDNESDAY 08.09.23    In what timeframe would the patient need to come in: BEFORE THE END OF THE DAY TODAY 08.07.23    Where will you receive your lab/imaging services: [facility] NEXT DOOR TO OFFICE    Additional notes: PLEASE CALL PATIENT BACK AND LET HER KNOW THAT THE LABS ARE IN SO SHE CAN DO FASTING LABS EARLY TOMORROW MORNING 08.08.23.

## 2023-08-08 ENCOUNTER — LAB (OUTPATIENT)
Dept: LAB | Facility: HOSPITAL | Age: 83
End: 2023-08-08
Payer: MEDICARE

## 2023-08-08 DIAGNOSIS — R53.83 OTHER FATIGUE: ICD-10-CM

## 2023-08-08 DIAGNOSIS — E55.9 VITAMIN D DEFICIENCY: ICD-10-CM

## 2023-08-08 DIAGNOSIS — R73.09 ABNORMAL GLUCOSE: ICD-10-CM

## 2023-08-08 DIAGNOSIS — E03.9 ACQUIRED HYPOTHYROIDISM: ICD-10-CM

## 2023-08-08 DIAGNOSIS — E78.2 MIXED HYPERLIPIDEMIA: ICD-10-CM

## 2023-08-08 LAB
25(OH)D3 SERPL-MCNC: 51.8 NG/ML (ref 30–100)
ALBUMIN SERPL-MCNC: 4.4 G/DL (ref 3.5–5.2)
ALBUMIN/GLOB SERPL: 1.6 G/DL
ALP SERPL-CCNC: 62 U/L (ref 39–117)
ALT SERPL W P-5'-P-CCNC: 21 U/L (ref 1–33)
ANION GAP SERPL CALCULATED.3IONS-SCNC: 13.4 MMOL/L (ref 5–15)
AST SERPL-CCNC: 32 U/L (ref 1–32)
BASOPHILS # BLD AUTO: 0.04 10*3/MM3 (ref 0–0.2)
BASOPHILS NFR BLD AUTO: 0.8 % (ref 0–1.5)
BILIRUB SERPL-MCNC: 0.4 MG/DL (ref 0–1.2)
BUN SERPL-MCNC: 18 MG/DL (ref 8–23)
BUN/CREAT SERPL: 16.8 (ref 7–25)
CALCIUM SPEC-SCNC: 10.1 MG/DL (ref 8.6–10.5)
CHLORIDE SERPL-SCNC: 97 MMOL/L (ref 98–107)
CHOLEST SERPL-MCNC: 210 MG/DL (ref 0–200)
CO2 SERPL-SCNC: 25.6 MMOL/L (ref 22–29)
CREAT SERPL-MCNC: 1.07 MG/DL (ref 0.57–1)
DEPRECATED RDW RBC AUTO: 45.6 FL (ref 37–54)
EGFRCR SERPLBLD CKD-EPI 2021: 52.6 ML/MIN/1.73
EOSINOPHIL # BLD AUTO: 0.21 10*3/MM3 (ref 0–0.4)
EOSINOPHIL NFR BLD AUTO: 4 % (ref 0.3–6.2)
ERYTHROCYTE [DISTWIDTH] IN BLOOD BY AUTOMATED COUNT: 12.6 % (ref 12.3–15.4)
GLOBULIN UR ELPH-MCNC: 2.8 GM/DL
GLUCOSE SERPL-MCNC: 99 MG/DL (ref 65–99)
HBA1C MFR BLD: 5.9 % (ref 4.8–5.6)
HCT VFR BLD AUTO: 43.1 % (ref 34–46.6)
HDLC SERPL-MCNC: 101 MG/DL (ref 40–60)
HGB BLD-MCNC: 14.8 G/DL (ref 12–15.9)
IMM GRANULOCYTES # BLD AUTO: 0.01 10*3/MM3 (ref 0–0.05)
IMM GRANULOCYTES NFR BLD AUTO: 0.2 % (ref 0–0.5)
LDLC SERPL CALC-MCNC: 95 MG/DL (ref 0–100)
LDLC/HDLC SERPL: 0.92 {RATIO}
LYMPHOCYTES # BLD AUTO: 2.17 10*3/MM3 (ref 0.7–3.1)
LYMPHOCYTES NFR BLD AUTO: 41.4 % (ref 19.6–45.3)
MCH RBC QN AUTO: 33.6 PG (ref 26.6–33)
MCHC RBC AUTO-ENTMCNC: 34.3 G/DL (ref 31.5–35.7)
MCV RBC AUTO: 97.7 FL (ref 79–97)
MONOCYTES # BLD AUTO: 0.6 10*3/MM3 (ref 0.1–0.9)
MONOCYTES NFR BLD AUTO: 11.5 % (ref 5–12)
NEUTROPHILS NFR BLD AUTO: 2.21 10*3/MM3 (ref 1.7–7)
NEUTROPHILS NFR BLD AUTO: 42.1 % (ref 42.7–76)
NRBC BLD AUTO-RTO: 0 /100 WBC (ref 0–0.2)
PLATELET # BLD AUTO: 225 10*3/MM3 (ref 140–450)
PMV BLD AUTO: 11.2 FL (ref 6–12)
POTASSIUM SERPL-SCNC: 4.5 MMOL/L (ref 3.5–5.2)
PROT SERPL-MCNC: 7.2 G/DL (ref 6–8.5)
RBC # BLD AUTO: 4.41 10*6/MM3 (ref 3.77–5.28)
SODIUM SERPL-SCNC: 136 MMOL/L (ref 136–145)
TRIGL SERPL-MCNC: 81 MG/DL (ref 0–150)
TSH SERPL DL<=0.05 MIU/L-ACNC: 1.2 UIU/ML (ref 0.27–4.2)
VLDLC SERPL-MCNC: 14 MG/DL (ref 5–40)
WBC NRBC COR # BLD: 5.24 10*3/MM3 (ref 3.4–10.8)

## 2023-08-08 PROCEDURE — 82306 VITAMIN D 25 HYDROXY: CPT

## 2023-08-08 PROCEDURE — 85025 COMPLETE CBC W/AUTO DIFF WBC: CPT

## 2023-08-08 PROCEDURE — 80061 LIPID PANEL: CPT

## 2023-08-08 PROCEDURE — 83036 HEMOGLOBIN GLYCOSYLATED A1C: CPT

## 2023-08-08 PROCEDURE — 80053 COMPREHEN METABOLIC PANEL: CPT

## 2023-08-08 PROCEDURE — 84443 ASSAY THYROID STIM HORMONE: CPT

## 2023-08-09 ENCOUNTER — OFFICE VISIT (OUTPATIENT)
Dept: INTERNAL MEDICINE | Facility: CLINIC | Age: 83
End: 2023-08-09
Payer: MEDICARE

## 2023-08-09 VITALS
SYSTOLIC BLOOD PRESSURE: 102 MMHG | BODY MASS INDEX: 24.92 KG/M2 | DIASTOLIC BLOOD PRESSURE: 64 MMHG | HEIGHT: 64 IN | TEMPERATURE: 96.1 F | WEIGHT: 146 LBS | HEART RATE: 78 BPM

## 2023-08-09 DIAGNOSIS — E78.2 MIXED HYPERLIPIDEMIA: ICD-10-CM

## 2023-08-09 DIAGNOSIS — R73.03 PREDIABETES: ICD-10-CM

## 2023-08-09 DIAGNOSIS — E03.9 ACQUIRED HYPOTHYROIDISM: ICD-10-CM

## 2023-08-09 DIAGNOSIS — Z86.73 HISTORY OF STROKE: ICD-10-CM

## 2023-08-09 DIAGNOSIS — K21.9 GASTROESOPHAGEAL REFLUX DISEASE WITHOUT ESOPHAGITIS: ICD-10-CM

## 2023-08-09 DIAGNOSIS — I48.0 PAROXYSMAL ATRIAL FIBRILLATION: ICD-10-CM

## 2023-08-09 DIAGNOSIS — E55.9 VITAMIN D DEFICIENCY: ICD-10-CM

## 2023-08-09 DIAGNOSIS — Z00.00 MEDICARE ANNUAL WELLNESS VISIT, SUBSEQUENT: Primary | ICD-10-CM

## 2023-08-09 DIAGNOSIS — M43.17 SPONDYLOLISTHESIS OF LUMBOSACRAL REGION: ICD-10-CM

## 2023-08-09 DIAGNOSIS — F41.1 ANXIETY STATE: ICD-10-CM

## 2023-08-09 PROCEDURE — 1159F MED LIST DOCD IN RCRD: CPT | Performed by: INTERNAL MEDICINE

## 2023-08-09 PROCEDURE — 1160F RVW MEDS BY RX/DR IN RCRD: CPT | Performed by: INTERNAL MEDICINE

## 2023-08-09 PROCEDURE — G0439 PPPS, SUBSEQ VISIT: HCPCS | Performed by: INTERNAL MEDICINE

## 2023-08-09 PROCEDURE — 99397 PER PM REEVAL EST PAT 65+ YR: CPT | Performed by: INTERNAL MEDICINE

## 2023-08-09 PROCEDURE — 1170F FXNL STATUS ASSESSED: CPT | Performed by: INTERNAL MEDICINE

## 2023-08-09 RX ORDER — UBIDECARENONE 100 MG
100 CAPSULE ORAL DAILY
COMMUNITY

## 2023-08-09 RX ORDER — DICYCLOMINE HYDROCHLORIDE 10 MG/1
10 CAPSULE ORAL NIGHTLY
Qty: 90 CAPSULE | Refills: 1 | Status: SHIPPED | OUTPATIENT
Start: 2023-08-09

## 2023-08-09 RX ORDER — ACYCLOVIR 400 MG/1
400 TABLET ORAL 2 TIMES DAILY
Qty: 180 TABLET | Refills: 1 | Status: SHIPPED | OUTPATIENT
Start: 2023-08-09

## 2023-08-09 NOTE — PROGRESS NOTES
QUICK REFERENCE INFORMATION:  The ABCs of the Annual Wellness Visit    Subsequent Medicare Wellness Visit    HEALTH RISK ASSESSMENT    1940    Recent Hospitalizations:  No hospitalization(s) within the last year..        Current Medical Providers:  Patient Care Team:  Ismael Jones MD as PCP - General  Romaine Tanner MD as Consulting Physician (Pain Medicine)  Sherice Carrington PA-C as Physician Assistant (Neurosurgery)  Jose Alberto Flores MD as Consulting Physician (Dermatology)  Edmond Hernandez OD (Optometry)  Vipul Olivier MD as Consulting Physician (Cardiology)  Ismael Jones MD as Consulting Physician (Internal Medicine)        Smoking Status:  Social History     Tobacco Use   Smoking Status Former    Packs/day: 0.50    Years: 30.00    Pack years: 15.00    Types: Cigarettes    Quit date:     Years since quittin.6   Smokeless Tobacco Never   Tobacco Comments    quit smoking in her 40's        Alcohol Consumption:  Social History     Substance and Sexual Activity   Alcohol Use Yes    Alcohol/week: 14.0 standard drinks    Types: 14 Glasses of wine per week       Depression Screen:       2023     9:58 AM   PHQ-2/PHQ-9 Depression Screening   Little Interest or Pleasure in Doing Things 0-->not at all   Feeling Down, Depressed or Hopeless 0-->not at all   PHQ-9: Brief Depression Severity Measure Score 0       Health Habits and Functional and Cognitive Screenin/9/2023     9:57 AM   Functional & Cognitive Status   Do you have difficulty preparing food and eating? No   Do you have difficulty bathing yourself, getting dressed or grooming yourself? No   Do you have difficulty using the toilet? No   Do you have difficulty moving around from place to place? No   Do you have trouble with steps or getting out of a bed or a chair? No   Current Diet Well Balanced Diet   Dental Exam Up to date   Eye Exam Up to date   Exercise (times per week) 4 times per week   Current  Exercises Include Pilates   Do you need help using the phone?  No   Are you deaf or do you have serious difficulty hearing?  No   Do you need help to go to places out of walking distance? No   Do you need help shopping? No   Do you need help preparing meals?  No   Do you need help with housework?  Yes   Do you need help with laundry? No   Do you need help taking your medications? No   Do you need help managing money? No   Do you ever drive or ride in a car without wearing a seat belt? No   Have you felt unusual stress, anger or loneliness in the last month? No   Who do you live with? Spouse   If you need help, do you have trouble finding someone available to you? No   Have you been bothered in the last four weeks by sexual problems? No   Do you have difficulty concentrating, remembering or making decisions? No       Fall Risk Screen:  ALEJANDRO Fall Risk Assessment was completed, and patient is at LOW risk for falls.Assessment completed on:8/9/2023    ACE III MINI        Does the patient have evidence of cognitive impairment? No    Aspirin use counseling: Taking ASA appropriately as indicated    Recent Lab Results:  CMP:  Lab Results   Component Value Date     (H) 11/22/2021     (H) 11/22/2021    BUN 18 08/08/2023    CREATININE 1.07 (H) 08/08/2023    EGFRIFNONA 51 (L) 07/31/2020    BCR 16.8 08/08/2023     08/08/2023    K 4.5 08/08/2023    CO2 25.6 08/08/2023    CALCIUM 10.1 08/08/2023    ALBUMIN 4.4 08/08/2023    BILITOT 0.4 08/08/2023    ALKPHOS 62 08/08/2023    AST 32 08/08/2023    ALT 21 08/08/2023     HbA1c:  Lab Results   Component Value Date    HGBA1C 5.90 (H) 08/08/2023    HGBA1C 5.90 (H) 07/12/2022     Microalbumin:  No results found for: MICROALBUR, POCMALB, POCCREAT  Lipid Panel  Lab Results   Component Value Date    CHOL 210 (H) 08/08/2023    TRIG 81 08/08/2023     (H) 08/08/2023    LDL 95 08/08/2023    AST 32 08/08/2023    ALT 21 08/08/2023       Visual Acuity:  No results  found.    Age-appropriate Screening Schedule:  Refer to the list below for future screening recommendations based on patient's age, sex and/or medical conditions. Orders for these recommended tests are listed in the plan section. The patient has been provided with a written plan.    Health Maintenance   Topic Date Due    DXA SCAN  09/18/2021    COVID-19 Vaccine (4 - Pfizer series) 10/18/2021    ANNUAL WELLNESS VISIT  08/10/2023    INFLUENZA VACCINE  10/01/2023    LIPID PANEL  08/08/2024    TDAP/TD VACCINES (3 - Td or Tdap) 03/20/2030    Pneumococcal Vaccine 65+  Completed    ZOSTER VACCINE  Completed    COLONOSCOPY  Discontinued        Subjective   History of Present Illness    Alanna Rodriges is a 82 y.o. female who presents for a Subsequent Wellness Visit.    who presents for a Subsequent Wellness Visit and for follow-up of hyperlipidemia, atrial fibrillation, hypothyroidism, vitamin D deficiency, anxiety, and lumbosacral spondylolisthesis.    Anxiety  The patient is doing well. She takes half 0.5 Xanax approximately 3 times a year. She is able to identify her attacks.     Balance issues  The patient reports that she feels like she is not in control of her body the way prior to the stroke. When she gets up, she becomes off balance. She states that she works out twice a week. She states that she does strength training and Pilates twice a week. She states that she has muscle, but there is something about her balance that has never recovered.    Carpal tunnel syndrome  The patient's hand is getting worse. She has carpal tunnel syndrome and ulnar compression. She had surgery on both hands. She admits that she has a Sinclair's finger now and is unable to cut things. She does some exercises that she learned in occupational therapy, but she does not do them like she should. She has seen neurology about her nerve issue.    Hair loss  The patient states that someone has recommended to her a pill that she can take for  hair loss called Nutrafol.      Atrial fibrillation  The patient has an appointment with her cardiologist in a couple of weeks. She would like to have her eyelids done again.    Back pain  The patient's back pain is unchanged. She will take 4 Advil gels and that provides her relief. She states that she woke up this morning with a stomachache. She states that sometimes she has stomachaches during the night. She does not take anything on a regular basis.    Prediabetes  The patient eats 1 piece of French bread instead of 2 or 3. She would love to lose 5 pounds.     Health maintenance  The patient takes dicyclomine every night and it helps with her stomach pain during the night. She states that her neurologist, Dr. Coronado, recommended that she take gabapentin 3 times a day.    CHRONIC CONDITIONS    The following portions of the patient's history were reviewed and updated as appropriate: allergies, current medications, past family history, past medical history, past social history, past surgical history, and problem list.    Outpatient Medications Prior to Visit   Medication Sig Dispense Refill    acetaminophen (TYLENOL) 325 MG tablet Take 1 tablet by mouth Every 6 (Six) Hours As Needed.      ALPRAZolam (XANAX) 0.5 MG tablet Take 1 tablet by mouth 2 (Two) Times a Day As Needed for Anxiety. 30 tablet 2    aspirin 81 MG EC tablet Take 1 tablet by mouth Daily. Last dose 11/28/2016      Boswellia-Glucosamine-Vit D (OSTEO BI-FLEX ONE PER DAY PO) Take 1 tablet by mouth Daily.      clobetasol (TEMOVATE) 0.05 % cream clobetasol 0.05 % topical cream      Eliquis 5 MG tablet tablet Take 1 tablet by mouth Daily.      gabapentin (NEURONTIN) 100 MG capsule TAKE 1 CAPSULE BY MOUTH 3  TIMES DAILY (Patient taking differently: Take 3 capsules by mouth Daily.) 270 capsule 3    ketoconazole (NIZORAL) 2 % cream Apply 1 application  topically to the appropriate area as directed Every 12 (Twelve) Hours. Shampoo weekly 30 g 1    ketoconazole  (NIZORAL) 2 % shampoo Apply  topically to the appropriate area as directed 1 (One) Time Per Week. 1 each 3    levETIRAcetam (KEPPRA) 500 MG tablet Take 1 tablet by mouth 2 (Two) Times a Day. 1/2 tab q am and 1 tab q pm      levothyroxine (SYNTHROID, LEVOTHROID) 88 MCG tablet Take 1 tablet by mouth Daily. 90 tablet 3    Loratadine 10 MG capsule Take 1 capsule by mouth Daily.      magnesium oxide (MAG-OX) 400 MG tablet Take 1 tablet by mouth 3 (Three) Times a Day. 500 mg tid      pantoprazole (PROTONIX) 40 MG EC tablet Take 1 tablet by mouth Daily.      rosuvastatin (Crestor) 10 MG tablet Take 1 tablet by mouth Daily. (Patient taking differently: Take 1 tablet by mouth 3 (Three) Times a Week.) 90 tablet 1    acyclovir (ZOVIRAX) 400 MG tablet TAKE 1 TABLET BY MOUTH  TWICE DAILY (Patient taking differently: Take 0.5 tablets by mouth Daily.) 180 tablet 1    dicyclomine (BENTYL) 10 MG capsule TAKE 1 CAPSULE BY MOUTH  EVERY NIGHT 90 capsule 1    coenzyme Q10 100 MG capsule Take 1 capsule by mouth Daily. Once daily      metoprolol succinate XL (TOPROL-XL) 25 MG 24 hr tablet Take 1 tablet by mouth every night at bedtime.       No facility-administered medications prior to visit.       Patient Active Problem List   Diagnosis    DDD (degenerative disc disease), lumbar    Primary osteoarthritis of right hip    History of ulcerative colitis    Spondylolisthesis of lumbosacral region, L5-S1    Spondylosis of lumbar region without myelopathy or radiculopathy    Neuroforaminal stenosis of spine    Leg length discrepancy    Ulnar neuropathy at elbow of right upper extremity    CTS (carpal tunnel syndrome)    Carpal tunnel syndrome of right wrist    Displacement of intervertebral disc of lumbosacral region    Anxiety state    Acute non intractable tension-type headache    Asthma    Bilateral hearing loss    BMI 26.0-26.9,adult    Depression    Dysuria    GERD (gastroesophageal reflux disease)    Hypothyroidism    Irritable bowel  syndrome    Medicare annual wellness visit, subsequent    Mixed hyperlipidemia    Osteoarthrosis    Disc disorder of cervical region    Vitamin D deficiency    Numbness of fingers    Arthritis    Cerebrovascular accident (CVA) due to embolism of cerebral artery    Paroxysmal atrial fibrillation       Advance Care Planning:  ACP discussion was held with the patient during this visit. Patient has an advance directive in EMR which is still valid.     Identification of Risk Factors:  Risk factors include: Advance Directive Discussion  Fall Risk.    Review of Systems   Constitutional:  Negative for chills, fatigue and fever.   HENT:  Negative for congestion, ear pain and sinus pressure.    Respiratory:  Negative for cough, chest tightness, shortness of breath and wheezing.    Cardiovascular:  Negative for chest pain and palpitations.   Gastrointestinal:  Negative for abdominal pain, blood in stool and constipation.   Skin:  Negative for color change.   Allergic/Immunologic: Negative for environmental allergies.   Neurological:  Negative for dizziness, speech difficulty and headaches.   Psychiatric/Behavioral:  Negative for confusion. The patient is not nervous/anxious.      Compared to one year ago, the patient feels her physical health is the same.  Compared to one year ago, the patient feels her mental health is the same.    Objective     Physical Exam  Vitals and nursing note reviewed.   Constitutional:       General: She is not in acute distress.     Appearance: She is well-developed. She is not diaphoretic.      Comments: NAD. A&Ox3.   HENT:      Head: Normocephalic and atraumatic.      Right Ear: External ear normal.      Left Ear: External ear normal.      Mouth/Throat:      Pharynx: No oropharyngeal exudate.   Eyes:      General: No scleral icterus.     Conjunctiva/sclera: Conjunctivae normal.      Pupils: Pupils are equal, round, and reactive to light.   Neck:      Thyroid: No thyromegaly.      Vascular: No  carotid bruit.   Cardiovascular:      Rate and Rhythm: Normal rate and regular rhythm.      Heart sounds: Normal heart sounds, S1 normal and S2 normal. No murmur heard.    No friction rub. No gallop. No S3 or S4 sounds.      Comments: No thrills. Pulses 2+/4 B/L in UE and LE.   Pulmonary:      Effort: Pulmonary effort is normal. No respiratory distress.      Breath sounds: Normal breath sounds. No wheezing, rhonchi or rales.   Chest:      Chest wall: No tenderness.   Abdominal:      General: Bowel sounds are normal. There is no distension or abdominal bruit.      Palpations: Abdomen is soft. There is no mass.      Tenderness: There is no abdominal tenderness.      Hernia: No hernia is present.      Comments: Nondistended, nontender. (+)BSx4.    Musculoskeletal:         General: No deformity. Normal range of motion.      Cervical back: Full passive range of motion without pain and neck supple.      Comments: She has weaknesses and some degenerative abnormalities in the right hand. She has numerous spider veins in both feet and ankles. She has scattered seborrheic keratoses.   Lymphadenopathy:      Cervical: No cervical adenopathy.   Skin:     General: Skin is warm and dry.      Findings: No erythema or rash.   Neurological:      Mental Status: She is alert and oriented to person, place, and time.      Cranial Nerves: No cranial nerve deficit.      Sensory: No sensory deficit.      Motor: No tremor, atrophy or abnormal muscle tone.      Coordination: Coordination normal.      Gait: Gait normal.      Deep Tendon Reflexes: Reflexes are normal and symmetric.   Psychiatric:         Behavior: Behavior normal.         Thought Content: Thought content normal. Thought content does not include homicidal or suicidal ideation. Thought content does not include homicidal or suicidal plan.         Judgment: Judgment normal.        Procedures     Vitals:    08/09/23 0953   BP: 102/64   Pulse: 78   Temp: 96.1 øF (35.6 øC)   Weight:  "66.2 kg (146 lb)   Height: 162.6 cm (64.02\")   PainSc:   2       BMI is >= 25 and <30. (Overweight) The following options were offered after discussion;: exercise counseling/recommendations and nutrition counseling/recommendations      Assessment & Plan   Problem List Items Addressed This Visit          Cardiac and Vasculature    Mixed hyperlipidemia    Relevant Medications    rosuvastatin (Crestor) 10 MG tablet    Paroxysmal atrial fibrillation       Endocrine and Metabolic    Hypothyroidism    Relevant Medications    levothyroxine (SYNTHROID, LEVOTHROID) 88 MCG tablet    Vitamin D deficiency       Gastrointestinal Abdominal     GERD (gastroesophageal reflux disease)    Relevant Medications    pantoprazole (PROTONIX) 40 MG EC tablet    magnesium oxide (MAG-OX) 400 MG tablet    dicyclomine (BENTYL) 10 MG capsule       Health Encounters    Medicare annual wellness visit, subsequent - Primary       Mental Health    Anxiety state    Relevant Medications    ALPRAZolam (XANAX) 0.5 MG tablet       Musculoskeletal and Injuries    Spondylolisthesis of lumbosacral region, L5-S1     Other Visit Diagnoses       History of stroke        Prediabetes              1. Prevention  - Overall, she is doing reasonably well. She has very good exercise tolerance.    2. Hyperlipidemia  - Lipids remain very well compensated on rosuvastatin.    3. Paroxysmal atrial fibrillation  - Controlled with metoprolol and Eliquis.    4. Hypothyroidism  - TSH is normal. She is clinically euthyroid on current dose of levothyroxine.    5. GERD  - GERD is controlled with pantoprazole. She takes dicyclomine at night for IBS symptoms.    6. Anxiety  - Anxiety appears to be well controlled. She takes alprazolam infrequently.    7. Lumbar sacral spondylolisthesis  - She occasionally takes ibuprofen for this because she is on Eliquis and also because her GFR is low today. I encouraged her not to take ibuprofen at all.    8. Prediabetes  - Hemoglobin A1c is 5.9 " percent. We discussed the importance of carb restriction and modest weight loss.    Follow up in 1 year or as needed.    Patient Self-Management and Personalized Health Advice  The patient has been provided with information about: diet and exercise and preventive services including:   Annual Wellness Visit (AWV).    Outpatient Encounter Medications as of 8/9/2023   Medication Sig Dispense Refill    acetaminophen (TYLENOL) 325 MG tablet Take 1 tablet by mouth Every 6 (Six) Hours As Needed.      acyclovir (ZOVIRAX) 400 MG tablet Take 1 tablet by mouth 2 (Two) Times a Day. Take no more than 5 doses a day. 180 tablet 1    ALPRAZolam (XANAX) 0.5 MG tablet Take 1 tablet by mouth 2 (Two) Times a Day As Needed for Anxiety. 30 tablet 2    aspirin 81 MG EC tablet Take 1 tablet by mouth Daily. Last dose 11/28/2016      Boswellia-Glucosamine-Vit D (OSTEO BI-FLEX ONE PER DAY PO) Take 1 tablet by mouth Daily.      clobetasol (TEMOVATE) 0.05 % cream clobetasol 0.05 % topical cream      dicyclomine (BENTYL) 10 MG capsule Take 1 capsule by mouth Every Night. 90 capsule 1    Eliquis 5 MG tablet tablet Take 1 tablet by mouth Daily.      gabapentin (NEURONTIN) 100 MG capsule TAKE 1 CAPSULE BY MOUTH 3  TIMES DAILY (Patient taking differently: Take 3 capsules by mouth Daily.) 270 capsule 3    ketoconazole (NIZORAL) 2 % cream Apply 1 application  topically to the appropriate area as directed Every 12 (Twelve) Hours. Shampoo weekly 30 g 1    ketoconazole (NIZORAL) 2 % shampoo Apply  topically to the appropriate area as directed 1 (One) Time Per Week. 1 each 3    levETIRAcetam (KEPPRA) 500 MG tablet Take 1 tablet by mouth 2 (Two) Times a Day. 1/2 tab q am and 1 tab q pm      levothyroxine (SYNTHROID, LEVOTHROID) 88 MCG tablet Take 1 tablet by mouth Daily. 90 tablet 3    Loratadine 10 MG capsule Take 1 capsule by mouth Daily.      magnesium oxide (MAG-OX) 400 MG tablet Take 1 tablet by mouth 3 (Three) Times a Day. 500 mg tid       pantoprazole (PROTONIX) 40 MG EC tablet Take 1 tablet by mouth Daily.      rosuvastatin (Crestor) 10 MG tablet Take 1 tablet by mouth Daily. (Patient taking differently: Take 1 tablet by mouth 3 (Three) Times a Week.) 90 tablet 1    [DISCONTINUED] acyclovir (ZOVIRAX) 400 MG tablet TAKE 1 TABLET BY MOUTH  TWICE DAILY (Patient taking differently: Take 0.5 tablets by mouth Daily.) 180 tablet 1    [DISCONTINUED] dicyclomine (BENTYL) 10 MG capsule TAKE 1 CAPSULE BY MOUTH  EVERY NIGHT 90 capsule 1    coenzyme Q10 100 MG capsule Take 1 capsule by mouth Daily. Once daily      metoprolol succinate XL (TOPROL-XL) 25 MG 24 hr tablet Take 1 tablet by mouth every night at bedtime.       No facility-administered encounter medications on file as of 8/9/2023.       Reviewed use of high risk medication in the elderly: yes  Reviewed for potential of harmful drug interactions in the elderly: yes    Follow Up:  Return in about 1 year (around 8/9/2024) for Medicare Wellness.     There are no Patient Instructions on file for this visit.    An After Visit Summary and PPPS with all of these plans were given to the patient.       Transcribed from ambient dictation for Ismael Jones MD by Jayne Carrion.  08/09/23   12:20 EDT    Patient or patient representative verbalized consent to the visit recording.  I have personally performed the services described in this document as transcribed by the above individual, and it is both accurate and complete.

## 2023-08-28 NOTE — PROGRESS NOTES
[facility] Cardiology  Follow Up Visit  Alanna Rodriges  1940    VISIT DATE:  08/29/23    PCP:   Ismael Jones MD  [address] [place] [id number]          CC:  Paroxysmal atrial fibrillation      Problem List:  1.  Paroxysmal A. Fib  2.  Previous CVA  -November 2021  -CTA no significant extracranial stenosis.  Moderate stenosis in the distal intracranial right vertebral artery      3.  Hypertension  4.  Seizures following her stroke.     Echo 11/2021:  EF 55 to 65%, normal left atrium, mild TR normal RVSP, trivial aortic insufficiency.,  Normal mitral valve function    History of Present Illness:  Alanna Rodriges  Is a 82 y.o. female with pertinent cardiac history detailed above.  Patient splits time between here and [facility].  I also see her  in the office.  She has been doing well from an atrial fibrillation standpoint.  She is having some myalgias which she believes are related to her Crestor.  She is taking it 3 times a week.  She is also taking coenzyme every 10.  Aside from this she has been feeling well.  In sinus rhythm today by exam.  Tolerating Eliquis without any bleeding side effects.        Patient Active Problem List    Diagnosis Date Noted    History of atrial fibrillation 08/29/2023    Hypomagnesemia 08/29/2023    Neuropathy 08/29/2023    Seizure disorder 08/29/2023    Pure hypercholesterolemia 04/04/2023    Head injury without skull fracture 02/15/2023    History of stroke with residual effects 01/30/2023    Cerebrovascular accident (CVA) due to embolism of cerebral artery 08/10/2022    Paroxysmal atrial fibrillation 08/10/2022    Ulcerative colitis 01/07/2022    Personal history of transient ischemic attack (TIA), and cerebral infarction without residual deficits 12/10/2021    Primary hypertension 12/10/2021    Arthritis     Numbness of fingers 08/06/2019    Anxiety state 07/03/2019    Acute non intractable tension-type  headache 2019    Asthma 2019    Bilateral hearing loss 2019    BMI 26.0-26.9,adult 2019    Depression 2019    Dysuria 2019    GERD (gastroesophageal reflux disease) 2019    Hypothyroidism 2019    Irritable bowel syndrome 2019    Medicare annual wellness visit, subsequent 2019    Mixed hyperlipidemia 2019    Osteoarthrosis 2019    Disc disorder of cervical region 2019    Vitamin D deficiency 2019    Displacement of intervertebral disc of lumbosacral region 2018    CTS (carpal tunnel syndrome) 2018    Carpal tunnel syndrome of right wrist 2018     Note Last Updated: 2018     Added automatically from request for surgery [id number]      Ulnar neuropathy at elbow of right upper extremity 2016    Primary osteoarthritis of right hip 2016    History of ulcerative colitis 2016    Spondylolisthesis of lumbosacral region, L5-S1 2016    Spondylosis of lumbar region without myelopathy or radiculopathy 2016    Neuroforaminal stenosis of spine 2016    Leg length discrepancy 2016    DDD (degenerative disc disease), lumbar 2016       Allergies   Allergen Reactions    Codeine Nausea Only    Morphine And Related Hallucinations     And itching After 3 days       Social History     Socioeconomic History    Marital status:    Tobacco Use    Smoking status: Former     Packs/day: 0.50     Years: 30.00     Pack years: 15.00     Types: Cigarettes     Quit date: 1984     Years since quittin.6    Smokeless tobacco: Never    Tobacco comments:     quit smoking in her 40's    Vaping Use    Vaping Use: Never used   Substance and Sexual Activity    Alcohol use: Yes     Alcohol/week: 14.0 standard drinks     Types: 14 Glasses of wine per week    Drug use: No    Sexual activity: Defer       Family History   Problem Relation Age of Onset    Alzheimer's disease Mother     Cancer Father      Stroke Brother     Cancer Maternal Grandmother     Coronary artery disease Maternal Grandfather     Heart disease Maternal Grandfather     Alzheimer's disease Other     Leukemia Other     Breast cancer Neg Hx     Ovarian cancer Neg Hx        Current Medications:    Current Outpatient Medications:     acetaminophen (TYLENOL) 325 MG tablet, Take 1 tablet by mouth Every 6 (Six) Hours As Needed., Disp: , Rfl:     acyclovir (ZOVIRAX) 400 MG tablet, Take 1 tablet by mouth 2 (Two) Times a Day. Take no more than 5 doses a day. (Patient taking differently: Take 0.5 tablets by mouth 2 (Two) Times a Day. Take no more than 5 doses a day.), Disp: 180 tablet, Rfl: 1    ALPRAZolam (XANAX) 0.5 MG tablet, Take 1 tablet by mouth 2 (Two) Times a Day As Needed for Anxiety., Disp: 30 tablet, Rfl: 2    aspirin 81 MG EC tablet, Take 1 tablet by mouth Daily. Last dose 11/28/2016, Disp: , Rfl:     Boswellia-Glucosamine-Vit D (OSTEO BI-FLEX ONE PER DAY PO), Take 1 tablet by mouth Daily., Disp: , Rfl:     clobetasol (TEMOVATE) 0.05 % cream, clobetasol 0.05 % topical cream, Disp: , Rfl:     coenzyme Q10 100 MG capsule, Take 1 capsule by mouth Daily. Once daily, Disp: , Rfl:     dicyclomine (BENTYL) 10 MG capsule, Take 1 capsule by mouth Every Night., Disp: 90 capsule, Rfl: 1    Eliquis 5 MG tablet tablet, Take 1 tablet by mouth 2 (Two) Times a Day., Disp: , Rfl:     estrogens, conjugated, (PREMARIN) 0.625 MG tablet, Take 1 tablet every day by oral route., Disp: , Rfl:     gabapentin (NEURONTIN) 100 MG capsule, TAKE 1 CAPSULE BY MOUTH 3  TIMES DAILY (Patient taking differently: Take 1 capsule by mouth 3 (Three) Times a Day.), Disp: 270 capsule, Rfl: 3    ketoconazole (NIZORAL) 2 % cream, Apply 1 application  topically to the appropriate area as directed Every 12 (Twelve) Hours. Shampoo weekly, Disp: 30 g, Rfl: 1    ketoconazole (NIZORAL) 2 % shampoo, Apply  topically to the appropriate area as directed 1 (One) Time Per Week., Disp: 1 each, Rfl:  "3    levETIRAcetam (KEPPRA) 500 MG tablet, Take 1 tablet by mouth Every Other Day., Disp: , Rfl:     levothyroxine (SYNTHROID, LEVOTHROID) 88 MCG tablet, Take 1 tablet by mouth Daily., Disp: 90 tablet, Rfl: 3    lidocaine (LIDODERM) 5 %, APPLY 1 TO 3 PATCHES TOPICALLY TO THE AFFECTED AREA ONCE DAILY (MAY WEAR UP TO 12 HOURS A TIME), Disp: , Rfl:     Loratadine 10 MG capsule, Take 1 capsule by mouth Daily., Disp: , Rfl:     magnesium oxide (MAG-OX) 400 MG tablet, Take 1 tablet by mouth 3 (Three) Times a Day. 500 mg tid, Disp: , Rfl:     metoprolol succinate XL (TOPROL-XL) 25 MG 24 hr tablet, Take 1 tablet by mouth every night at bedtime., Disp: , Rfl:     pantoprazole (PROTONIX) 40 MG EC tablet, Take 1 tablet by mouth Daily., Disp: , Rfl:     rosuvastatin (Crestor) 10 MG tablet, Take 1 tablet by mouth Daily. (Patient taking differently: Take 1 tablet by mouth 3 (Three) Times a Week.), Disp: 90 tablet, Rfl: 1    ezetimibe (ZETIA) 10 MG tablet, Take 1 tablet by mouth Daily., Disp: 90 tablet, Rfl: 3     Review of Systems   Cardiovascular:  Negative for chest pain, irregular heartbeat and palpitations.   Musculoskeletal:  Positive for muscle cramps.     Vitals:    08/29/23 0934   BP: 104/70   BP Location: Right arm   Patient Position: Sitting   Pulse: 72   SpO2: 97%   Weight: 66.7 kg (147 lb)   Height: 165.1 cm (65\")       Physical Exam  Constitutional:       Appearance: Normal appearance.   Cardiovascular:      Rate and Rhythm: Normal rate and regular rhythm.      Pulses: Normal pulses.      Heart sounds: Normal heart sounds.   Pulmonary:      Effort: Pulmonary effort is normal.      Breath sounds: Normal breath sounds.   Musculoskeletal:      Right lower leg: No edema.      Left lower leg: No edema.   Neurological:      General: No focal deficit present.      Mental Status: She is alert.       Diagnostic Data:  Procedures  Lab Results   Component Value Date    TRIG 81 08/08/2023     (H) 08/08/2023     Lab " Results   Component Value Date    GLUCOSE 99 08/08/2023    BUN 18 08/08/2023    CREATININE 1.07 (H) 08/08/2023     08/08/2023    K 4.5 08/08/2023    CL 97 (L) 08/08/2023    CO2 25.6 08/08/2023     Lab Results   Component Value Date    HGBA1C 5.90 (H) 08/08/2023     Lab Results   Component Value Date    WBC 5.24 08/08/2023    HGB 14.8 08/08/2023    HCT 43.1 08/08/2023     08/08/2023       Assessment:  No diagnosis found.    Plan:    1.  Paroxysmal A. Fib,  Stroke related to atrial fibrillation  -Stroke affected the left occipital lobe  -[id number] (female, age greater than 75, hypertension, stroke)  -Continue Toprol-XL and Eliquis  -Currently asymptomatic    2. HTN  -controlled    3.  HLD  -Myalgias with statins previously.  Now on rosuvastatin 10 mg three times weekly  -Total cholesterol 210, , LDL 95  - add zetia 10mg, and she can hold Crestor at this time    Follow-up in 1 year or sooner as needed.  She will have follow-up in [place] in the spring      ACP discussion was held with the patient during this visit. Patient has an advance directive in EMR which is still valid.       Vipul Olivier MD [facility]

## 2023-08-29 ENCOUNTER — OFFICE VISIT (OUTPATIENT)
Dept: CARDIOLOGY | Facility: CLINIC | Age: 83
End: 2023-08-29
Payer: MEDICARE

## 2023-08-29 VITALS
DIASTOLIC BLOOD PRESSURE: 70 MMHG | HEART RATE: 72 BPM | WEIGHT: 147 LBS | BODY MASS INDEX: 24.49 KG/M2 | HEIGHT: 65 IN | SYSTOLIC BLOOD PRESSURE: 104 MMHG | OXYGEN SATURATION: 97 %

## 2023-08-29 DIAGNOSIS — I48.0 PAROXYSMAL ATRIAL FIBRILLATION: Primary | ICD-10-CM

## 2023-08-29 PROBLEM — E83.42 HYPOMAGNESEMIA: Status: ACTIVE | Noted: 2023-08-29

## 2023-08-29 PROBLEM — Z86.73 PERSONAL HISTORY OF TRANSIENT ISCHEMIC ATTACK (TIA), AND CEREBRAL INFARCTION WITHOUT RESIDUAL DEFICITS: Status: ACTIVE | Noted: 2021-12-10

## 2023-08-29 PROBLEM — G40.909 SEIZURE DISORDER: Status: ACTIVE | Noted: 2023-08-29

## 2023-08-29 PROBLEM — S09.90XA HEAD INJURY WITHOUT SKULL FRACTURE: Status: ACTIVE | Noted: 2023-02-15

## 2023-08-29 PROBLEM — I10 PRIMARY HYPERTENSION: Status: ACTIVE | Noted: 2021-12-10

## 2023-08-29 PROBLEM — I69.30 HISTORY OF STROKE WITH RESIDUAL EFFECTS: Status: ACTIVE | Noted: 2023-01-30

## 2023-08-29 PROBLEM — K51.90 ULCERATIVE COLITIS: Status: ACTIVE | Noted: 2022-01-07

## 2023-08-29 PROBLEM — G62.9 NEUROPATHY: Status: ACTIVE | Noted: 2023-08-29

## 2023-08-29 PROBLEM — Z86.79 HISTORY OF ATRIAL FIBRILLATION: Status: ACTIVE | Noted: 2023-08-29

## 2023-08-29 PROBLEM — E78.00 PURE HYPERCHOLESTEROLEMIA: Status: ACTIVE | Noted: 2023-04-04

## 2023-08-29 PROCEDURE — 3074F SYST BP LT 130 MM HG: CPT | Performed by: INTERNAL MEDICINE

## 2023-08-29 PROCEDURE — 3078F DIAST BP <80 MM HG: CPT | Performed by: INTERNAL MEDICINE

## 2023-08-29 PROCEDURE — 99214 OFFICE O/P EST MOD 30 MIN: CPT | Performed by: INTERNAL MEDICINE

## 2023-08-29 RX ORDER — IBUPROFEN 600 MG/1
TABLET ORAL
COMMUNITY
End: 2023-08-29

## 2023-08-29 RX ORDER — LIDOCAINE 50 MG/G
PATCH TOPICAL
COMMUNITY

## 2023-08-29 RX ORDER — TOBRAMYCIN AND DEXAMETHASONE 3; 1 MG/ML; MG/ML
SUSPENSION/ DROPS OPHTHALMIC
COMMUNITY
End: 2023-08-29

## 2023-08-29 RX ORDER — METHOCARBAMOL 750 MG/1
1 TABLET, FILM COATED ORAL 3 TIMES DAILY PRN
COMMUNITY
End: 2023-08-29

## 2023-08-29 RX ORDER — AZITHROMYCIN 250 MG/1
TABLET, FILM COATED ORAL
COMMUNITY
End: 2023-08-29

## 2023-08-29 RX ORDER — EZETIMIBE 10 MG/1
10 TABLET ORAL DAILY
Qty: 90 TABLET | Refills: 3 | Status: SHIPPED | OUTPATIENT
Start: 2023-08-29

## 2023-08-29 RX ORDER — ONDANSETRON 4 MG/1
TABLET, FILM COATED ORAL
COMMUNITY
End: 2023-08-29

## 2023-08-29 RX ORDER — TRIAMCINOLONE ACETONIDE 1 MG/G
CREAM TOPICAL
COMMUNITY
End: 2023-08-29

## 2023-08-29 RX ORDER — ALBUTEROL SULFATE 90 UG/1
AEROSOL, METERED RESPIRATORY (INHALATION)
COMMUNITY
End: 2023-08-29

## 2023-08-29 RX ORDER — DOXYCYCLINE HYCLATE 100 MG/1
CAPSULE ORAL
COMMUNITY
End: 2023-08-29

## 2023-08-29 RX ORDER — TIZANIDINE 4 MG/1
1 TABLET ORAL EVERY 8 HOURS PRN
COMMUNITY
End: 2023-08-29

## 2023-08-29 RX ORDER — ATORVASTATIN CALCIUM 10 MG/1
TABLET, FILM COATED ORAL
COMMUNITY
End: 2023-08-29

## 2023-08-29 RX ORDER — RABEPRAZOLE SODIUM 20 MG/1
TABLET, DELAYED RELEASE ORAL
COMMUNITY
End: 2023-08-29

## 2023-09-21 ENCOUNTER — TELEPHONE (OUTPATIENT)
Dept: INTERNAL MEDICINE | Facility: CLINIC | Age: 83
End: 2023-09-21

## 2023-09-21 NOTE — TELEPHONE ENCOUNTER
Caller: Alanna Rodriges    Relationship: Self    Best call back number: [phone]    What is the best time to reach you: ANY TIME    Who are you requesting to speak with (clinical staff, provider,  specific staff member): DR CORRAL    Do you know the name of the person who called: SELF    What was the call regarding: PATIENT CALLED ASKING WHAT COVID VACCINES SHE SHOULD GET? ALSO IF THERE ARE ANY OTHER VACCINES SHE SHOULD GET BECAUSE OF THE NEW VARIANTS. AND ALSO WHEN SHOULD PATIENT GET HER FLU VACCINE? PLEASE ADVISE

## 2023-09-21 NOTE — TELEPHONE ENCOUNTER
Advised pt to get the new covid vaccine and she may be due for the prevnar 20. Will check with Dr. Jones. She will get the flu shot in Oct

## 2023-10-02 RX ORDER — KETOCONAZOLE 20 MG/G
CREAM TOPICAL
Qty: 60 G | Refills: 1 | Status: SHIPPED | OUTPATIENT
Start: 2023-10-02

## 2023-10-25 ENCOUNTER — TELEPHONE (OUTPATIENT)
Dept: INTERNAL MEDICINE | Facility: CLINIC | Age: 83
End: 2023-10-25

## 2023-10-25 ENCOUNTER — TELEPHONE (OUTPATIENT)
Dept: INTERNAL MEDICINE | Facility: CLINIC | Age: 83
End: 2023-10-25
Payer: MEDICARE

## 2023-10-25 DIAGNOSIS — F41.1 ANXIETY STATE: ICD-10-CM

## 2023-10-25 RX ORDER — ESTRADIOL 0.1 MG/G
2 CREAM VAGINAL DAILY
Qty: 42.5 G | Refills: 12 | Status: SHIPPED | OUTPATIENT
Start: 2023-10-25

## 2023-10-25 RX ORDER — ALPRAZOLAM 0.5 MG/1
0.5 TABLET ORAL 2 TIMES DAILY PRN
Qty: 30 TABLET | Refills: 2 | Status: SHIPPED | OUTPATIENT
Start: 2023-10-25

## 2023-10-25 NOTE — TELEPHONE ENCOUNTER
"Called and left detailed voicemail advising the below, requesting a callback for follow-up to discuss verbally over the phone:    \"Rx refilled\"  "

## 2023-10-25 NOTE — TELEPHONE ENCOUNTER
"Caller: Alanna Rodriges Luiz    Relationship: Self    Best call back number: [phone]     Requested Prescriptions:   -\"VAGINAL CREAM\" TO HELP MAKE SEXUAL INTERCOURSE EASIER     Pharmacy where request should be sent: [facility] #[id number] - [address] AT [facility] & [address] - [phone]  - [phone] FX     Last office visit with prescribing clinician: 8/9/2023   Last telemedicine visit with prescribing clinician: Visit date not found   Next office visit with prescribing clinician: 8/13/2024     Additional details provided by patient: PATIENT STATED THAT DR CORRAL HAS PRESCRIBED THIS KIND OF MEDICATION IN THE PAST, BUT SHE IS NOT SURE WHAT THE NAME IS.    PLEASE ADVISE IF THERE ARE ANY QUESTIONS/CONCERNS.     Alexandro Ortiz Rep   10/25/23 15:57 EDT   "

## 2023-10-25 NOTE — TELEPHONE ENCOUNTER
"Called and left detailed voicemail advising the below, requesting a callback for follow-up to discuss verbally over the phone:    \"RX sent in and to call back if she has questions\"  "

## 2023-10-25 NOTE — TELEPHONE ENCOUNTER
Caller: Alanna Rodriges Luiz    Relationship: Self    Best call back number: [phone]     Requested Prescriptions:   Requested Prescriptions     Pending Prescriptions Disp Refills    ALPRAZolam (XANAX) 0.5 MG tablet 30 tablet 2     Sig: Take 1 tablet by mouth 2 (Two) Times a Day As Needed for Anxiety.      Pharmacy where request should be sent: [facility] #[id number] - [address] & [facility] - [phone]  - [phone] FX     Last office visit with prescribing clinician: 8/9/2023   Last telemedicine visit with prescribing clinician: Visit date not found   Next office visit with prescribing clinician: 8/13/2024     Additional details provided by patient: PATIENT HAS NONE REMAINING OF THIS PRESCRIPTION.    Does the patient have less than a 3 day supply:  [x] Yes  [] No    Would you like a call back once the refill request has been completed: [] Yes [x] No    If the office needs to give you a call back, can they leave a voicemail: [] Yes [x] No    Alexandro Ortiz Rep   10/25/23 15:55 EDT

## 2023-10-25 NOTE — TELEPHONE ENCOUNTER
Caller: [facility] #[id number] - [place], [place] - [address] [facility] AT [facility] OF [facility] & [address] - [phone]  - [phone] FX    Relationship: Pharmacy    Best call back number: [phone]     Which medication are you concerned about: ESTRADIOL CREAM     What are your concerns: PHARMACY WOULD LIKE TO CLARIFY THE DIRECTIONS ON THIS MEDICATION.

## 2023-10-25 NOTE — TELEPHONE ENCOUNTER
Spoke with the pharmacy. They stated the estradiol is usually done daily for 14 days then twice weekly.    They asked if the directions could be updated to daily for 14 days, then to use twice weekly?

## 2023-10-26 ENCOUNTER — TELEPHONE (OUTPATIENT)
Dept: INTERNAL MEDICINE | Facility: CLINIC | Age: 83
End: 2023-10-26

## 2023-10-26 RX ORDER — ACYCLOVIR 200 MG/1
400 CAPSULE ORAL 2 TIMES DAILY
Qty: 180 CAPSULE | Refills: 3 | Status: SHIPPED | OUTPATIENT
Start: 2023-10-26 | End: 2023-10-30 | Stop reason: SDUPTHER

## 2023-10-26 NOTE — TELEPHONE ENCOUNTER
Caller: Alanna Rodriges    Relationship: Self    Best call back number: [phone]     What is the best time to reach you: ANYTIME, OK TO LEAVE VOICEMAIL.    Who are you requesting to speak with (clinical staff, provider,  specific staff member): CLINICAL STAFF    Do you know the name of the person who called: PATIENT    What was the call regarding: PATIENT ADVISES THAT THE MEDICATION ACYCLOVIR IS DIFFICULT TO CUT IN HALF AND WOULD LIKE TO KNOW IF 200MG IS AVAILABLE, IF SO COULD THIS BE CALLED IN PLEASE?

## 2023-10-30 RX ORDER — ACYCLOVIR 200 MG/1
400 CAPSULE ORAL 2 TIMES DAILY
Qty: 180 CAPSULE | Refills: 3 | Status: SHIPPED | OUTPATIENT
Start: 2023-10-30

## 2023-10-30 NOTE — TELEPHONE ENCOUNTER
Caller: Alanna Rodriges    Relationship: Self    Best call back number: [phone]     Requested Prescriptions:   Requested Prescriptions     Pending Prescriptions Disp Refills    acyclovir (Zovirax) 200 MG capsule 180 capsule 3     Sig: Take 2 capsules by mouth 2 (Two) Times a Day.        Pharmacy where request should be sent: [facility] - [place] [address] [facility] [phone] [facility] [phone]      Last office visit with prescribing clinician: 8/9/2023   Last telemedicine visit with prescribing clinician: Visit date not found   Next office visit with prescribing clinician: 8/13/2024         Does the patient have less than a 3 day supply:  [] Yes  [x] No    Would you like a call back once the refill request has been completed: [] Yes [x] No    If the office needs to give you a call back, can they leave a voicemail: [] Yes [x] No    Alexandro No Rep   10/30/23 14:52 EDT

## 2024-01-15 RX ORDER — DICYCLOMINE HYDROCHLORIDE 10 MG/1
10 CAPSULE ORAL NIGHTLY
Qty: 90 CAPSULE | Refills: 1 | Status: SHIPPED | OUTPATIENT
Start: 2024-01-15

## 2024-01-16 DIAGNOSIS — M47.26 OSTEOARTHRITIS OF SPINE WITH RADICULOPATHY, LUMBAR REGION: ICD-10-CM

## 2024-01-16 RX ORDER — GABAPENTIN 100 MG/1
CAPSULE ORAL
Qty: 270 CAPSULE | Refills: 3 | Status: SHIPPED | OUTPATIENT
Start: 2024-01-16

## 2024-01-16 NOTE — TELEPHONE ENCOUNTER
Caller: Alanna Rodriges    Relationship: Self    Best call back number: [phone]     Requested Prescriptions:   Requested Prescriptions     Pending Prescriptions Disp Refills    gabapentin (NEURONTIN) 100 MG capsule 270 capsule 3     Sig: TAKE 1 CAPSULE BY MOUTH 3  TIMES DAILY        Pharmacy where request should be sent: [facility] - [place] [address] [facility] [phone] [facility] [phone]      Last office visit with prescribing clinician: 8/9/2023   Last telemedicine visit with prescribing clinician: Visit date not found   Next office visit with prescribing clinician: 8/13/2024     Additional details provided by patient:     Does the patient have less than a 3 day supply:  [] Yes  [x] No    Would you like a call back once the refill request has been completed: [] Yes [x] No    If the office needs to give you a call back, can they leave a voicemail: [] Yes [x] No    Alexandro Craven Rep   01/16/24 10:23 EST

## 2024-02-06 ENCOUNTER — APPOINTMENT (RX ONLY)
Dept: URBAN - METROPOLITAN AREA CLINIC 85 | Facility: CLINIC | Age: 84
Setting detail: DERMATOLOGY
End: 2024-02-06

## 2024-02-06 DIAGNOSIS — L81.4 OTHER MELANIN HYPERPIGMENTATION: ICD-10-CM

## 2024-02-06 DIAGNOSIS — L57.0 ACTINIC KERATOSIS: ICD-10-CM

## 2024-02-06 DIAGNOSIS — D18.0 HEMANGIOMA: ICD-10-CM | Status: UNCHANGED

## 2024-02-06 DIAGNOSIS — L57.8 OTHER SKIN CHANGES DUE TO CHRONIC EXPOSURE TO NONIONIZING RADIATION: ICD-10-CM | Status: STABLE

## 2024-02-06 DIAGNOSIS — I83.9 ASYMPTOMATIC VARICOSE VEINS OF LOWER EXTREMITIES: ICD-10-CM

## 2024-02-06 DIAGNOSIS — L85.3 XEROSIS CUTIS: ICD-10-CM | Status: STABLE

## 2024-02-06 DIAGNOSIS — Z85.828 PERSONAL HISTORY OF OTHER MALIGNANT NEOPLASM OF SKIN: ICD-10-CM

## 2024-02-06 DIAGNOSIS — L82.1 OTHER SEBORRHEIC KERATOSIS: ICD-10-CM

## 2024-02-06 PROBLEM — D18.01 HEMANGIOMA OF SKIN AND SUBCUTANEOUS TISSUE: Status: ACTIVE | Noted: 2024-02-06

## 2024-02-06 PROBLEM — I83.93 ASYMPTOMATIC VARICOSE VEINS OF BILATERAL LOWER EXTREMITIES: Status: ACTIVE | Noted: 2024-02-06

## 2024-02-06 PROCEDURE — ? PRESCRIPTION MEDICATION MANAGEMENT

## 2024-02-06 PROCEDURE — ? SUNSCREEN RECOMMENDATIONS

## 2024-02-06 PROCEDURE — 99214 OFFICE O/P EST MOD 30 MIN: CPT

## 2024-02-06 PROCEDURE — ? COUNSELING

## 2024-02-06 PROCEDURE — ? ADDITIONAL NOTES

## 2024-02-06 PROCEDURE — ? EDUCATIONAL RESOURCES PROVIDED

## 2024-02-06 ASSESSMENT — LOCATION DETAILED DESCRIPTION DERM
LOCATION DETAILED: RIGHT VENTRAL PROXIMAL FOREARM
LOCATION DETAILED: RIGHT PROXIMAL PRETIBIAL REGION
LOCATION DETAILED: RIGHT INFERIOR UPPER BACK
LOCATION DETAILED: RIGHT PROXIMAL DORSAL FOREARM
LOCATION DETAILED: RIGHT SUPERIOR CENTRAL BUCCAL CHEEK
LOCATION DETAILED: RIGHT KNEE
LOCATION DETAILED: STERNAL NOTCH
LOCATION DETAILED: PERIUMBILICAL SKIN
LOCATION DETAILED: LEFT INFERIOR UPPER BACK
LOCATION DETAILED: RIGHT ANTERIOR DISTAL THIGH
LOCATION DETAILED: LEFT ANTERIOR PROXIMAL UPPER ARM
LOCATION DETAILED: LEFT ANTERIOR PROXIMAL THIGH
LOCATION DETAILED: LEFT DISTAL POSTERIOR UPPER ARM
LOCATION DETAILED: RIGHT SUPERIOR MEDIAL MIDBACK
LOCATION DETAILED: RIGHT INFERIOR LATERAL FOREHEAD
LOCATION DETAILED: RIGHT LATERAL ABDOMEN
LOCATION DETAILED: RIGHT INFERIOR CENTRAL MALAR CHEEK
LOCATION DETAILED: RIGHT DISTAL PRETIBIAL REGION
LOCATION DETAILED: RIGHT MEDIAL UPPER BACK
LOCATION DETAILED: RIGHT DORSAL WRIST
LOCATION DETAILED: LEFT DISTAL PRETIBIAL REGION
LOCATION DETAILED: LEFT VENTRAL PROXIMAL FOREARM
LOCATION DETAILED: RIGHT SUPERIOR UPPER BACK
LOCATION DETAILED: LEFT DISTAL DORSAL FOREARM
LOCATION DETAILED: LEFT PROXIMAL PRETIBIAL REGION
LOCATION DETAILED: LEFT SUPERIOR LATERAL MIDBACK
LOCATION DETAILED: LEFT CLAVICULAR SKIN
LOCATION DETAILED: LEFT MID-UPPER BACK
LOCATION DETAILED: RIGHT MEDIAL SUPERIOR CHEST
LOCATION DETAILED: RIGHT ANTERIOR PROXIMAL THIGH
LOCATION DETAILED: LEFT FOREHEAD
LOCATION DETAILED: RIGHT ANTERIOR SHOULDER
LOCATION DETAILED: LEFT ANTERIOR DISTAL UPPER ARM
LOCATION DETAILED: INFERIOR THORACIC SPINE
LOCATION DETAILED: RIGHT ANTERIOR DISTAL UPPER ARM
LOCATION DETAILED: RIGHT MID-UPPER BACK
LOCATION DETAILED: RIGHT SUPERIOR LATERAL MIDBACK
LOCATION DETAILED: LEFT PROXIMAL DORSAL FOREARM
LOCATION DETAILED: RIGHT ANTERIOR PROXIMAL UPPER ARM
LOCATION DETAILED: RIGHT ELBOW
LOCATION DETAILED: LEFT ANTERIOR DISTAL THIGH
LOCATION DETAILED: RIGHT RIB CAGE
LOCATION DETAILED: EPIGASTRIC SKIN
LOCATION DETAILED: LEFT INFERIOR MEDIAL MALAR CHEEK
LOCATION DETAILED: LEFT SUPERIOR UPPER BACK
LOCATION DETAILED: RIGHT SUPERIOR MEDIAL UPPER BACK
LOCATION DETAILED: LEFT LATERAL ABDOMEN
LOCATION DETAILED: LEFT ANTERIOR SHOULDER
LOCATION DETAILED: LEFT UPPER CUTANEOUS LIP
LOCATION DETAILED: LEFT SUPERIOR LATERAL UPPER BACK

## 2024-02-06 ASSESSMENT — LOCATION ZONE DERM
LOCATION ZONE: TRUNK
LOCATION ZONE: LEG
LOCATION ZONE: FACE
LOCATION ZONE: LIP
LOCATION ZONE: ARM

## 2024-02-06 ASSESSMENT — LOCATION SIMPLE DESCRIPTION DERM
LOCATION SIMPLE: RIGHT LOWER BACK
LOCATION SIMPLE: LEFT POSTERIOR UPPER ARM
LOCATION SIMPLE: LEFT UPPER BACK
LOCATION SIMPLE: CHEST
LOCATION SIMPLE: RIGHT KNEE
LOCATION SIMPLE: RIGHT WRIST
LOCATION SIMPLE: LEFT FOREHEAD
LOCATION SIMPLE: RIGHT FOREARM
LOCATION SIMPLE: LEFT PRETIBIAL REGION
LOCATION SIMPLE: RIGHT PRETIBIAL REGION
LOCATION SIMPLE: LEFT CLAVICULAR SKIN
LOCATION SIMPLE: LEFT THIGH
LOCATION SIMPLE: LEFT UPPER ARM
LOCATION SIMPLE: LEFT FOREARM
LOCATION SIMPLE: RIGHT CHEEK
LOCATION SIMPLE: UPPER BACK
LOCATION SIMPLE: LEFT SHOULDER
LOCATION SIMPLE: RIGHT UPPER BACK
LOCATION SIMPLE: LEFT LIP
LOCATION SIMPLE: RIGHT SHOULDER
LOCATION SIMPLE: LEFT CHEEK
LOCATION SIMPLE: RIGHT UPPER ARM
LOCATION SIMPLE: ABDOMEN
LOCATION SIMPLE: RIGHT ELBOW
LOCATION SIMPLE: RIGHT FOREHEAD
LOCATION SIMPLE: LEFT LOWER BACK
LOCATION SIMPLE: RIGHT THIGH

## 2024-02-06 NOTE — PROCEDURE: ADDITIONAL NOTES
Render Risk Assessment In Note?: no
Detail Level: Simple
Additional Notes: Discussed cosmetic procedures such as lasers. Pt advised to wear sunscreen spf 50 or higher daily.
Additional Notes: Pt to see Dr Ahumada for consult

## 2024-02-06 NOTE — PROCEDURE: MIPS QUALITY
Detail Level: Detailed
Quality 431: Preventive Care And Screening: Unhealthy Alcohol Use - Screening: Patient not identified as an unhealthy alcohol user when screened for unhealthy alcohol use using a systematic screening method
Quality 130: Documentation Of Current Medications In The Medical Record: Current Medications Documented
Quality 226: Preventive Care And Screening: Tobacco Use: Screening And Cessation Intervention: Patient screened for tobacco use and is an ex/non-smoker
Quality 402: Tobacco Use And Help With Quitting Among Adolescents: Patient screened for tobacco and is an ex-smoker

## 2024-05-21 ENCOUNTER — TRANSCRIBE ORDERS (OUTPATIENT)
Dept: ADMINISTRATIVE | Facility: HOSPITAL | Age: 84
End: 2024-05-21
Payer: MEDICARE

## 2024-05-21 DIAGNOSIS — Z12.31 VISIT FOR SCREENING MAMMOGRAM: Primary | ICD-10-CM

## 2024-06-23 LAB
NCCN CRITERIA FLAG: NORMAL
TYRER CUZICK SCORE: 1.1

## 2024-07-05 NOTE — PROGRESS NOTES
Physical Therapy Daily Progress Note    Subjective   Alanna Rodriges reports that she has been doing some pilates with a  and doing the therapy exercises at home as well. She has occasional pain in the low back but overall she feels that she is doing much better.     Objective   See Exercise, Manual, and Modality Logs for complete treatment.       Assessment/Plan     Pt is progressing very well with therapy, she demonstrates an improved understanding of her HEP and is able to complete exercises without exacerbation of symptoms.     Progress strengthening /stabilization /functional activity           Manual Therapy:         mins  06793;  Therapeutic Exercise:    59     mins  34527;     Neuromuscular Eloy:        mins  43666;    Therapeutic Activity:          mins  65172;     Gait Training:           mins  07190;     Ultrasound:          mins  18681;    Electrical Stimulation:         mins  13903 ( );  Dry Needling          mins self-pay    Timed Treatment:   59   mins   Total Treatment:     65   mins    Brody Leon, PT  Physical Therapist   Physical Therapy Daily Treatment Note    [facility]  [address] [place] [address]  [phone] (phone)  [phone] (fax)    Patient: Nereida Myles   : 1971  Diagnosis/ICD-10 Code:  Chronic bilateral low back pain with bilateral sciatica [M54.42, M54.41, G89.29]  Referring practitioner: LINDA Garber  Date of Initial Visit: Type: THERAPY  Noted: 2024  Today's Date: 2024  Patient seen for 6 sessions             Subjective   Doing about the same.    Objective     AQUATIC EX:     Water Walk    Forward, sideways x 2 laps ea  Tandem Walk across pool w/ noodle support 25 ft X 4  Stretch 1                      HS w/ large noodle under ankle 40 sec ea  Stretch 2                      Piriformis 20 sec x 2 ea  Stretch 3                      Wall Walk (BKTC) 30 sec x 2  Vertical Traction          LN/rail x 3 min  Abdominals                 Large Solid Noodle Pushdowns x 15  Hip Abd/Add                15x ea  Hip Flex/Ext                 15x ea  Mini Squat                   15x  Toe/Heel Raises          15x  NEW: Plank 30 sec X 2  NEW: Leg Press w/ lg foam ring X 10 each  Uni-Clock                    -  Step Ups                     -  Suspended on Noodle:   Bicycle, Flutter kick, Scissors Kick 1 min each        Rows w/ closed paddles X 15  Paddle Stirs  10/10     Assessment/Plan  Continue stretching and strengthening for back rehab.  Today progressed resistance on the Abdominal Pressdowns from regular noodle to a solid noodle and added leg press and the plank.          Timed:  Aquatic Therapy    25     mins 95182;    Ganga Weston, PT  Physical Therapist    [place] License:  [id number]

## 2024-07-30 ENCOUNTER — HOSPITAL ENCOUNTER (OUTPATIENT)
Dept: MAMMOGRAPHY | Facility: HOSPITAL | Age: 84
Discharge: HOME | End: 2024-07-30
Admitting: INTERNAL MEDICINE
Payer: MEDICARE

## 2024-07-30 DIAGNOSIS — Z12.31 VISIT FOR SCREENING MAMMOGRAM: ICD-10-CM

## 2024-07-30 PROCEDURE — 77067 SCR MAMMO BI INCL CAD: CPT

## 2024-07-30 PROCEDURE — 77063 BREAST TOMOSYNTHESIS BI: CPT

## 2024-08-05 ENCOUNTER — TELEPHONE (OUTPATIENT)
Dept: INTERNAL MEDICINE | Facility: CLINIC | Age: 84
End: 2024-08-05
Payer: MEDICARE

## 2024-08-05 DIAGNOSIS — E55.9 VITAMIN D DEFICIENCY: ICD-10-CM

## 2024-08-05 DIAGNOSIS — I10 PRIMARY HYPERTENSION: ICD-10-CM

## 2024-08-05 DIAGNOSIS — M79.10 MYALGIA: ICD-10-CM

## 2024-08-05 DIAGNOSIS — E78.2 MIXED HYPERLIPIDEMIA: ICD-10-CM

## 2024-08-05 DIAGNOSIS — R73.09 ABNORMAL GLUCOSE: ICD-10-CM

## 2024-08-05 DIAGNOSIS — E03.9 ACQUIRED HYPOTHYROIDISM: Primary | ICD-10-CM

## 2024-08-05 RX ORDER — DICYCLOMINE HYDROCHLORIDE 10 MG/1
10 CAPSULE ORAL NIGHTLY
Qty: 90 CAPSULE | Refills: 1 | Status: SHIPPED | OUTPATIENT
Start: 2024-08-05

## 2024-08-05 NOTE — TELEPHONE ENCOUNTER
Caller: Alanna Rodriges    Relationship: Self    Best call back number: [phone]    What orders are you requesting (i.e. lab or imaging): LABS INCLUDING MAGNESIUM LEVELS    In what timeframe would the patient need to come in: ASAP/ THIS FRIDAY    Where will you receive your lab/imaging services: OFFICE LOCATION    Additional notes: WELLNESS VISIT [id number]

## 2024-08-09 ENCOUNTER — LAB (OUTPATIENT)
Dept: LAB | Facility: HOSPITAL | Age: 84
End: 2024-08-09
Payer: MEDICARE

## 2024-08-09 DIAGNOSIS — E03.9 ACQUIRED HYPOTHYROIDISM: ICD-10-CM

## 2024-08-09 DIAGNOSIS — M79.10 MYALGIA: ICD-10-CM

## 2024-08-09 DIAGNOSIS — E78.2 MIXED HYPERLIPIDEMIA: ICD-10-CM

## 2024-08-09 DIAGNOSIS — I10 PRIMARY HYPERTENSION: ICD-10-CM

## 2024-08-09 DIAGNOSIS — E55.9 VITAMIN D DEFICIENCY: ICD-10-CM

## 2024-08-09 DIAGNOSIS — R73.09 ABNORMAL GLUCOSE: ICD-10-CM

## 2024-08-09 LAB
CHOLEST SERPL-MCNC: 220 MG/DL (ref 0–200)
HBA1C MFR BLD: 5.8 % (ref 4.8–5.6)
HDLC SERPL-MCNC: 106 MG/DL (ref 40–60)
LDLC SERPL CALC-MCNC: 103 MG/DL (ref 0–100)
LDLC/HDLC SERPL: 0.96 {RATIO}
MAGNESIUM SERPL-MCNC: 1.4 MG/DL (ref 1.6–2.4)
TRIGL SERPL-MCNC: 60 MG/DL (ref 0–150)
TSH SERPL DL<=0.05 MIU/L-ACNC: 0.46 UIU/ML (ref 0.27–4.2)
VLDLC SERPL-MCNC: 11 MG/DL (ref 5–40)

## 2024-08-09 PROCEDURE — 82306 VITAMIN D 25 HYDROXY: CPT

## 2024-08-09 PROCEDURE — 80061 LIPID PANEL: CPT

## 2024-08-09 PROCEDURE — 80053 COMPREHEN METABOLIC PANEL: CPT

## 2024-08-09 PROCEDURE — 83036 HEMOGLOBIN GLYCOSYLATED A1C: CPT

## 2024-08-09 PROCEDURE — 85025 COMPLETE CBC W/AUTO DIFF WBC: CPT

## 2024-08-09 PROCEDURE — 36415 COLL VENOUS BLD VENIPUNCTURE: CPT

## 2024-08-09 PROCEDURE — 84443 ASSAY THYROID STIM HORMONE: CPT

## 2024-08-09 PROCEDURE — 83735 ASSAY OF MAGNESIUM: CPT

## 2024-08-10 LAB
25(OH)D3 SERPL-MCNC: 61.2 NG/ML (ref 30–100)
ALBUMIN SERPL-MCNC: 4.7 G/DL (ref 3.5–5.2)
ALBUMIN/GLOB SERPL: 1.4 G/DL
ALP SERPL-CCNC: 69 U/L (ref 39–117)
ALT SERPL W P-5'-P-CCNC: 15 U/L (ref 1–33)
ANION GAP SERPL CALCULATED.3IONS-SCNC: 16 MMOL/L (ref 5–15)
AST SERPL-CCNC: 26 U/L (ref 1–32)
BASOPHILS # BLD AUTO: 0.04 10*3/MM3 (ref 0–0.2)
BASOPHILS NFR BLD AUTO: 0.7 % (ref 0–1.5)
BILIRUB SERPL-MCNC: 1 MG/DL (ref 0–1.2)
BUN SERPL-MCNC: 15 MG/DL (ref 8–23)
BUN/CREAT SERPL: 15.8 (ref 7–25)
CALCIUM SPEC-SCNC: 9.9 MG/DL (ref 8.6–10.5)
CHLORIDE SERPL-SCNC: 94 MMOL/L (ref 98–107)
CO2 SERPL-SCNC: 25 MMOL/L (ref 22–29)
CREAT SERPL-MCNC: 0.95 MG/DL (ref 0.57–1)
DEPRECATED RDW RBC AUTO: 47.4 FL (ref 37–54)
EGFRCR SERPLBLD CKD-EPI 2021: 60.3 ML/MIN/1.73
EOSINOPHIL # BLD AUTO: 0.13 10*3/MM3 (ref 0–0.4)
EOSINOPHIL NFR BLD AUTO: 2.4 % (ref 0.3–6.2)
ERYTHROCYTE [DISTWIDTH] IN BLOOD BY AUTOMATED COUNT: 13.8 % (ref 12.3–15.4)
GLOBULIN UR ELPH-MCNC: 3.3 GM/DL
GLUCOSE SERPL-MCNC: 95 MG/DL (ref 65–99)
HCT VFR BLD AUTO: 51.4 % (ref 34–46.6)
HGB BLD-MCNC: 16.9 G/DL (ref 12–15.9)
IMM GRANULOCYTES # BLD AUTO: 0.01 10*3/MM3 (ref 0–0.05)
IMM GRANULOCYTES NFR BLD AUTO: 0.2 % (ref 0–0.5)
LYMPHOCYTES # BLD AUTO: 1.85 10*3/MM3 (ref 0.7–3.1)
LYMPHOCYTES NFR BLD AUTO: 33.9 % (ref 19.6–45.3)
MCH RBC QN AUTO: 31.1 PG (ref 26.6–33)
MCHC RBC AUTO-ENTMCNC: 32.9 G/DL (ref 31.5–35.7)
MCV RBC AUTO: 94.5 FL (ref 79–97)
MONOCYTES # BLD AUTO: 0.59 10*3/MM3 (ref 0.1–0.9)
MONOCYTES NFR BLD AUTO: 10.8 % (ref 5–12)
NEUTROPHILS NFR BLD AUTO: 2.84 10*3/MM3 (ref 1.7–7)
NEUTROPHILS NFR BLD AUTO: 52 % (ref 42.7–76)
NRBC BLD AUTO-RTO: 0 /100 WBC (ref 0–0.2)
PLATELET # BLD AUTO: 230 10*3/MM3 (ref 140–450)
PMV BLD AUTO: 10.7 FL (ref 6–12)
POTASSIUM SERPL-SCNC: 4.3 MMOL/L (ref 3.5–5.2)
PROT SERPL-MCNC: 8 G/DL (ref 6–8.5)
RBC # BLD AUTO: 5.44 10*6/MM3 (ref 3.77–5.28)
SODIUM SERPL-SCNC: 135 MMOL/L (ref 136–145)
WBC NRBC COR # BLD AUTO: 5.46 10*3/MM3 (ref 3.4–10.8)

## 2024-08-13 ENCOUNTER — OFFICE VISIT (OUTPATIENT)
Dept: INTERNAL MEDICINE | Facility: CLINIC | Age: 84
End: 2024-08-13
Payer: MEDICARE

## 2024-08-13 VITALS
DIASTOLIC BLOOD PRESSURE: 70 MMHG | BODY MASS INDEX: 24.77 KG/M2 | HEIGHT: 63 IN | OXYGEN SATURATION: 98 % | HEART RATE: 58 BPM | WEIGHT: 139.8 LBS | SYSTOLIC BLOOD PRESSURE: 110 MMHG | TEMPERATURE: 98.6 F

## 2024-08-13 DIAGNOSIS — E78.2 MIXED HYPERLIPIDEMIA: ICD-10-CM

## 2024-08-13 DIAGNOSIS — E55.9 VITAMIN D DEFICIENCY: ICD-10-CM

## 2024-08-13 DIAGNOSIS — M48.00 NEUROFORAMINAL STENOSIS OF SPINE: ICD-10-CM

## 2024-08-13 DIAGNOSIS — E03.9 ACQUIRED HYPOTHYROIDISM: ICD-10-CM

## 2024-08-13 DIAGNOSIS — F41.1 ANXIETY STATE: ICD-10-CM

## 2024-08-13 DIAGNOSIS — I10 PRIMARY HYPERTENSION: ICD-10-CM

## 2024-08-13 DIAGNOSIS — I48.0 PAROXYSMAL ATRIAL FIBRILLATION: ICD-10-CM

## 2024-08-13 DIAGNOSIS — Z00.00 MEDICARE ANNUAL WELLNESS VISIT, SUBSEQUENT: Primary | ICD-10-CM

## 2024-08-13 NOTE — PROGRESS NOTES
Subjective   The ABCs of the Annual Wellness Visit  Medicare Wellness Visit      Alanna Rodriges is a 83 y.o. patient who presents for a Medicare Wellness Visit.    The following portions of the patient's history were reviewed and   updated as appropriate: allergies, current medications, past family history, past medical history, past social history, past surgical history, and problem list.    Compared to one year ago, the patient's physical   health is the same.  Compared to one year ago, the patient's mental   health is the same.    Recent Hospitalizations:  She was not admitted to the hospital during the last year.     Current Medical Providers:  Patient Care Team:  Ismael Jones MD as PCP - General  Romaine Tanner MD as Consulting Physician (Pain Medicine)  Sherice Carrington PA-C as Physician Assistant (Neurosurgery)  Jose Alberto Flores MD as Consulting Physician (Dermatology)  Edmond Hernandez OD (Optometry)  Vipul Olivier MD as Consulting Physician (Cardiology)  Ismael Jones MD as Consulting Physician (Internal Medicine)    Outpatient Medications Prior to Visit   Medication Sig Dispense Refill    acetaminophen (TYLENOL) 325 MG tablet Take 1 tablet by mouth Every 6 (Six) Hours As Needed.      acyclovir (Zovirax) 200 MG capsule Take 2 capsules by mouth 2 (Two) Times a Day. 180 capsule 3    ALPRAZolam (XANAX) 0.5 MG tablet Take 1 tablet by mouth 2 (Two) Times a Day As Needed for Anxiety. 30 tablet 2    Boswellia-Glucosamine-Vit D (OSTEO BI-FLEX ONE PER DAY PO) Take 1 tablet by mouth Daily.      coenzyme Q10 100 MG capsule Take 1 capsule by mouth Daily. Once daily      dicyclomine (BENTYL) 10 MG capsule TAKE 1 CAPSULE BY MOUTH EVERY  NIGHT 90 capsule 1    Eliquis 5 MG tablet tablet Take 1 tablet by mouth 2 (Two) Times a Day.      ezetimibe (ZETIA) 10 MG tablet Take 1 tablet by mouth Daily. 90 tablet 3    ketoconazole (NIZORAL) 2 % cream APPLY TOPICALLY TO THE  APPROPRIATE  AREA AS DIRECTED  EVERY 12 HOURS 60 g 1    ketoconazole (NIZORAL) 2 % shampoo Apply  topically to the appropriate area as directed 1 (One) Time Per Week. 1 each 3    levothyroxine (SYNTHROID, LEVOTHROID) 88 MCG tablet Take 1 tablet by mouth Daily. 90 tablet 3    Loratadine 10 MG capsule Take 1 capsule by mouth Daily.      magnesium oxide (MAG-OX) 400 MG tablet Take 1 tablet by mouth 3 (Three) Times a Day. 500 mg tid      metoprolol succinate XL (TOPROL-XL) 25 MG 24 hr tablet Take 1 tablet by mouth every night at bedtime.      pantoprazole (PROTONIX) 40 MG EC tablet Take 1 tablet by mouth Daily.      gabapentin (NEURONTIN) 100 MG capsule TAKE 1 CAPSULE BY MOUTH 3  TIMES DAILY 270 capsule 3    aspirin 81 MG EC tablet Take 1 tablet by mouth Daily. Last dose 11/28/2016 (Patient not taking: Reported on 8/13/2024)      clobetasol (TEMOVATE) 0.05 % cream clobetasol 0.05 % topical cream      estradiol (ESTRACE) 0.1 MG/GM vaginal cream Insert 2 g into the vagina Daily. (Patient not taking: Reported on 8/13/2024) 42.5 g 12    estrogens, conjugated, (PREMARIN) 0.625 MG tablet Take 1 tablet every day by oral route. (Patient not taking: Reported on 8/13/2024)      levETIRAcetam (KEPPRA) 500 MG tablet Take 1 tablet by mouth Every Other Day.      lidocaine (LIDODERM) 5 % APPLY 1 TO 3 PATCHES TOPICALLY TO THE AFFECTED AREA ONCE DAILY (MAY WEAR UP TO 12 HOURS A TIME) (Patient not taking: Reported on 8/13/2024)      Nirmatrelvir & Ritonavir, 300mg/100mg, (PAXLOVID) 20 x 150 MG & 10 x 100MG tablet therapy pack tablet Take 3 tablets by mouth 2 (Two) Times a Day. (Patient not taking: Reported on 8/13/2024) 30 each 0    rosuvastatin (Crestor) 10 MG tablet Take 1 tablet by mouth Daily. (Patient not taking: Reported on 8/13/2024) 90 tablet 1     No facility-administered medications prior to visit.     No opioid medication identified on active medication list. I have reviewed chart for other potential  high risk medication/s and harmful  drug interactions in the elderly.      Aspirin is on active medication list. Aspirin use is not indicated based on review of current medical condition/s. Risk of harm outweighs potential benefits. Patient instructed to discontinue this medication.  .      Patient Active Problem List   Diagnosis    DDD (degenerative disc disease), lumbar    Primary osteoarthritis of right hip    History of ulcerative colitis    Spondylolisthesis of lumbosacral region, L5-S1    Spondylosis of lumbar region without myelopathy or radiculopathy    Neuroforaminal stenosis of spine    Leg length discrepancy    Ulnar neuropathy at elbow of right upper extremity    CTS (carpal tunnel syndrome)    Carpal tunnel syndrome of right wrist    Displacement of intervertebral disc of lumbosacral region    Anxiety state    Acute non intractable tension-type headache    Asthma    Bilateral hearing loss    BMI 26.0-26.9,adult    Depression    Dysuria    GERD (gastroesophageal reflux disease)    Hypothyroidism    Irritable bowel syndrome    Medicare annual wellness visit, subsequent    Mixed hyperlipidemia    Osteoarthrosis    Disc disorder of cervical region    Vitamin D deficiency    Numbness of fingers    Arthritis    Cerebrovascular accident (CVA) due to embolism of cerebral artery    Paroxysmal atrial fibrillation    Head injury without skull fracture    History of atrial fibrillation    History of stroke with residual effects    Hypomagnesemia    Neuropathy    Personal history of transient ischemic attack (TIA), and cerebral infarction without residual deficits    Primary hypertension    Pure hypercholesterolemia    Seizure disorder    Ulcerative colitis     Advance Care Planning Advance Directive is on file.  ACP discussion was held with the patient during this visit. Patient has an advance directive in EMR which is still valid.             Objective   Vitals:    08/13/24 0925   BP: 110/70   BP Location: Left arm   Patient Position: Sitting   Cuff  "Size: Adult   Pulse: 58   Temp: 98.6 °F (37 °C)   TempSrc: Infrared   SpO2: 98%   Weight: 63.4 kg (139 lb 12.8 oz)   Height: 160 cm (63\")   PainSc: 0-No pain       Estimated body mass index is 24.76 kg/m² as calculated from the following:    Height as of this encounter: 160 cm (63\").    Weight as of this encounter: 63.4 kg (139 lb 12.8 oz).    BMI is within normal parameters. No other follow-up for BMI required.       Does the patient have evidence of cognitive impairment? No  Lab Results   Component Value Date    TRIG 60 2024     (H) 2024     (H) 2024    VLDL 11 2024    HGBA1C 5.80 (H) 2024                                                                                                Health  Risk Assessment    Smoking Status:  Social History     Tobacco Use   Smoking Status Former    Current packs/day: 0.00    Average packs/day: 0.5 packs/day for 30.0 years (15.0 ttl pk-yrs)    Types: Cigarettes    Start date:     Quit date:     Years since quittin.6   Smokeless Tobacco Never   Tobacco Comments    quit smoking in her 40's      Alcohol Consumption:  Social History     Substance and Sexual Activity   Alcohol Use Yes    Alcohol/week: 14.0 standard drinks of alcohol    Types: 14 Glasses of wine per week       Fall Risk Screen  STEADI Fall Risk Assessment was completed, and patient is at HIGH risk for falls. Assessment completed on:2024    Depression Screenin/13/2024     9:27 AM   PHQ-2/PHQ-9 Depression Screening   Little Interest or Pleasure in Doing Things 0-->not at all   Feeling Down, Depressed or Hopeless 1-->several days   PHQ-9: Brief Depression Severity Measure Score 1     Health Habits and Functional and Cognitive Screenin/13/2024     9:26 AM   Functional & Cognitive Status   Do you have difficulty preparing food and eating? No   Do you have difficulty bathing yourself, getting dressed or grooming yourself? No   Do you have " difficulty using the toilet? No   Do you have difficulty moving around from place to place? No   Do you have trouble with steps or getting out of a bed or a chair? No   Current Diet Well Balanced Diet   Dental Exam Up to date   Eye Exam Up to date   Exercise (times per week) 7 times per week        Exercise Comment strength and balance   Do you need help using the phone?  No   Are you deaf or do you have serious difficulty hearing?  No   Do you need help to go to places out of walking distance? No   Do you need help shopping? No   Do you need help preparing meals?  No   Do you need help with housework?  Yes   Do you need help with laundry? Yes   Do you need help managing money? No   Do you ever drive or ride in a car without wearing a seat belt? No   Have you felt unusual stress, anger or loneliness in the last month? No   Who do you live with? Spouse   If you need help, do you have trouble finding someone available to you? No   Have you been bothered in the last four weeks by sexual problems? Yes   Do you have difficulty concentrating, remembering or making decisions? No           Age-appropriate Screening Schedule:  Refer to the list below for future screening recommendations based on patient's age, sex and/or medical conditions. Orders for these recommended tests are listed in the plan section. The patient has been provided with a written plan.    Health Maintenance List  Health Maintenance   Topic Date Due    DXA SCAN  09/18/2021    COVID-19 Vaccine (5 - 2023-24 season) 01/26/2024    ANNUAL WELLNESS VISIT  08/09/2024    INFLUENZA VACCINE  08/01/2024    LIPID PANEL  08/09/2025    TDAP/TD VACCINES (3 - Td or Tdap) 03/20/2030    RSV Vaccine - Adults  Completed    Pneumococcal Vaccine 65+  Completed    ZOSTER VACCINE  Completed    COLONOSCOPY  Discontinued                                                                                                                                                CMS Preventative  Services Quick Reference  Risk Factors Identified During Encounter  Fall Risk-High or Moderate: Discussed Fall Prevention in the home    The above risks/problems have been discussed with the patient.  Pertinent information has been shared with the patient in the After Visit Summary.  An After Visit Summary and PPPS were made available to the patient.    Follow Up:   Next Medicare Wellness visit to be scheduled in 1 year.         Additional E&M Note during same encounter follows:  Patient has additional, significant, and separately identifiable condition(s)/problem(s) that require work above and beyond the Medicare Wellness Visit     Chief Complaint  Medicare Wellness-subsequent and Hypertension    Subjective    HPI  Alanna is also being seen today for an annual adult preventative physical exam.        The patient is an 83-year-old female who comes in for subsequent Medicare annual wellness examination and for follow-up of hypertension, hyperlipidemia, hypothyroidism, vitamin D deficiency, and anxiety. She continues to see cardiology for atrial fibrillation.    She reports a recent fall on a carpet, resulting in a complete severed rotator cuff, although she did not hit her head. She is considering a reverse total right shoulder replacement, as her mobility has significantly increased. She has a history of carpal tunnel surgery on her left hand, which is deteriorating due to an ulnar nerve decompression. She also has a minor brachial impingement, for which she is undergoing physical therapy. In late 02/2024, following an injury to her rotator cuff, she stood up at home at noon, turned quickly, and her right ankle gave out, causing her to fall on the same shoulder. She acknowledges that she needs to increase her physical activity, including walking and using a stationary bike, to improve her strength.    She gradually discontinued gabapentin, suspecting she was taking too many medications. She also discontinued Keppra  "after experiencing two seizures and a stroke three years ago. Her neurologist recommended Keppra and regular magnesium checks, but she has not had a seizure since. Her last seizure occurred in 1975 when she was nearing death.    She is currently taking half a tablet of metoprolol.    She is currently taking aspirin daily, Eliquis, and ezetimibe. She is no longer taking statins due to adverse reactions. She is considering discontinuing Claritin for allergies and does not take alprazolam on a regular basis.       No opioid medication identified on active medication list. I have reviewed chart for other potential  high risk medication/s and harmful drug interactions in the elderly.             Objective   Vital Signs:  /70 (BP Location: Left arm, Patient Position: Sitting, Cuff Size: Adult)   Pulse 58   Temp 98.6 °F (37 °C) (Infrared)   Ht 160 cm (63\")   Wt 63.4 kg (139 lb 12.8 oz)   SpO2 98%   BMI 24.76 kg/m²   Physical Exam  Vitals reviewed.   Constitutional:       Appearance: Normal appearance. She is well-developed.   HENT:      Head: Normocephalic and atraumatic.      Right Ear: Tympanic membrane and ear canal normal.      Left Ear: Tympanic membrane and ear canal normal.      Mouth/Throat:      Pharynx: Oropharynx is clear. No posterior oropharyngeal erythema.   Eyes:      Extraocular Movements: Extraocular movements intact.      Pupils: Pupils are equal, round, and reactive to light.   Neck:      Thyroid: No thyromegaly.      Vascular: No carotid bruit.   Cardiovascular:      Rate and Rhythm: Normal rate and regular rhythm.      Heart sounds: Normal heart sounds. No murmur heard.     No friction rub. No gallop.   Pulmonary:      Effort: Pulmonary effort is normal.      Breath sounds: Normal breath sounds.   Abdominal:      General: Bowel sounds are normal.      Palpations: Abdomen is soft.      Tenderness: There is no abdominal tenderness.   Musculoskeletal:      Cervical back: Normal range of motion " and neck supple.      Right lower leg: No edema.      Left lower leg: No edema.   Lymphadenopathy:      Cervical: No cervical adenopathy.   Skin:     General: Skin is warm and dry.   Neurological:      Mental Status: She is alert and oriented to person, place, and time.      Cranial Nerves: No cranial nerve deficit.   Psychiatric:         Mood and Affect: Mood normal.         Behavior: Behavior normal.           Vital Signs  Vitals show a blood pressure of 110/70.             Assessment and Plan Additional age appropriate preventative wellness advice topics were discussed during today's preventative wellness exam(some topics already addressed during AWV portion of the note above):    Physical Activity: Advised cardiovascular activity 150 minutes per week as tolerated. (example brisk walk for 30 minutes, 5 days a week).     Nutrition: Discussed nutrition plan with patient. Information shared in after visit summary. Goal is for a well balanced diet to enhance overall health.     Healthy Weight: Discussed current and goal BMI with patient. Steps to attain this goal discussed. Information shared in after visit summary.     Injury Prevention Discussion:  Information shared in after visit summary.                1. Prevention.  Given her comorbidities, she is doing reasonably well. She plans to resume regular exercise to try to regain some strength.    2. Hypertension.  Blood pressure is actually low normal. Because she has some balance issues, she may reduce her metoprolol succinate to 12.5 mg a day.    3. Hyperlipidemia.  Lipids are reasonably well controlled on ezetimibe.    4. Hypothyroidism.  TSH is normal on current dose of levothyroxine. She is clinically euthyroid.    5. Vitamin D deficiency.  Vitamin D level is normal. Continue current dose of vitamin D.    6. Anxiety.  Anxiety is reasonably well controlled. She uses alprazolam infrequently.    7. Osteoarthritis of multiple joints, now including a complete  rotator cuff tear on the right.  This will require reverse total shoulder replacement. She plans to have this done in [place] at the first of the year.    8. Atrial fibrillation.  She currently appears to be in sinus rhythm. Continue apixaban and metoprolol.    Follow-up  The patient will follow up in 1 year.    No orders of the defined types were placed in this encounter.            Follow Up   Return in about 1 year (around 8/13/2025) for Medicare Wellness.  Patient was given instructions and counseling regarding her condition or for health maintenance advice. Please see specific information pulled into the AVS if appropriate.  Patient or patient representative verbalized consent for the use of Ambient Listening during the visit with  Ismael Jones MD for chart documentation. 8/15/2024  08:08 EDT

## 2024-08-19 ENCOUNTER — TELEPHONE (OUTPATIENT)
Dept: INTERNAL MEDICINE | Facility: CLINIC | Age: 84
End: 2024-08-19
Payer: MEDICARE

## 2024-08-19 NOTE — TELEPHONE ENCOUNTER
Caller: Alanna Rodriges    Relationship: Self    Best call back number: [phone]    What was the call regarding:PATIENT CALLED ASKING WHAT VACCINES SHOULD SHE GET THIS FALL AND WHAT ORDER SHOULD SHE TAKE THEM.    Anticoagulation Summary  As of 2021    INR goal:  2.0-3.0   TTR:  54.7 % (4 mo)   INR used for dosin.8 (2021)   Warfarin maintenance plan:  12.5 mg (5 mg x 2.5) every M, W, F; 10 mg (5 mg x 2) all other days   Weekly warfarin total:  77.5 mg   No change documented:  Em Ocasio RN   Plan last modified:  Em Ocasio RN (2021)   Next INR check:  2021   Priority:  Follow-Up - 2 Days   Target end date:           Anticoagulation Episode Summary     INR check location:      Preferred lab:      Send INR reminders to:  TEETEE GASPAR [facility] NURSE [facility] PO[facility]    Comments:

## 2024-09-02 NOTE — PROGRESS NOTES
[facility]  Follow Up Visit  Alanna Rodriges  1940    VISIT DATE:  09/03/24    PCP:   Ismael Jones MD  [address] [place] [id number]          CC:  Paroxysmal atrial fibrillation      Problem List:  1.  Paroxysmal A. Fib  2.  Previous CVA  -November 2021  -CTA no significant extracranial stenosis.  Moderate stenosis in the distal intracranial right vertebral artery        3.  Hypertension  4.  Seizures following her stroke.     Echo 11/2021:  EF 55 to 65%, normal left atrium, mild TR normal RVSP, trivial aortic insufficiency.,  Normal mitral valve function      History of Present Illness:  Alanna Rodriges  Is a 83 y.o. female with pertinent cardiac history detailed above.  Patient has not noted any symptomatic recurrence of atrial fibrillation.  She is compliant with Eliquis.  She does need to have shoulder surgery which she is planning for this winter in [place].  She does follow-up with [facility] in [place] also.  She is on Zetia for hyperlipidemia, there is room to optimize her lipids further.  She has no chest pain.      Patient Active Problem List    Diagnosis Date Noted    History of atrial fibrillation 08/29/2023    Hypomagnesemia 08/29/2023    Neuropathy 08/29/2023    Seizure disorder 08/29/2023    Pure hypercholesterolemia 04/04/2023    Head injury without skull fracture 02/15/2023    History of stroke with residual effects 01/30/2023    Cerebrovascular accident (CVA) due to embolism of cerebral artery 08/10/2022    Paroxysmal atrial fibrillation 08/10/2022    Ulcerative colitis 01/07/2022    Personal history of transient ischemic attack (TIA), and cerebral infarction without residual deficits 12/10/2021    Primary hypertension 12/10/2021    Arthritis     Numbness of fingers 08/06/2019    Anxiety state 07/03/2019    Acute non intractable tension-type headache 07/03/2019    Asthma 07/03/2019    Bilateral hearing loss 07/03/2019     BMI 26.0-26.9,adult 2019    Depression 2019    Dysuria 2019    GERD (gastroesophageal reflux disease) 2019    Hypothyroidism 2019    Irritable bowel syndrome 2019    Medicare annual wellness visit, subsequent 2019    Mixed hyperlipidemia 2019    Osteoarthrosis 2019    Disc disorder of cervical region 2019    Vitamin D deficiency 2019    Displacement of intervertebral disc of lumbosacral region 2018    CTS (carpal tunnel syndrome) 2018    Carpal tunnel syndrome of right wrist 2018     Note Last Updated: 2018     Added automatically from request for surgery [id number]      Ulnar neuropathy at elbow of right upper extremity 2016    Primary osteoarthritis of right hip 2016    History of ulcerative colitis 2016    Spondylolisthesis of lumbosacral region, L5-S1 2016    Spondylosis of lumbar region without myelopathy or radiculopathy 2016    Neuroforaminal stenosis of spine 2016    Leg length discrepancy 2016    DDD (degenerative disc disease), lumbar 2016       Allergies   Allergen Reactions    Codeine Nausea Only    Morphine And Codeine Hallucinations     And itching After 3 days       Social History     Socioeconomic History    Marital status:    Tobacco Use    Smoking status: Former     Current packs/day: 0.00     Average packs/day: 0.5 packs/day for 30.0 years (15.0 ttl pk-yrs)     Types: Cigarettes     Start date:      Quit date:      Years since quittin.7    Smokeless tobacco: Never    Tobacco comments:     Less thanten per day   Vaping Use    Vaping status: Never Used   Substance and Sexual Activity    Alcohol use: Yes     Alcohol/week: 14.0 standard drinks of alcohol     Types: 14 Glasses of wine per week    Drug use: No    Sexual activity: Yes     Partners: Male     Birth control/protection: Hysterectomy       Family History   Problem Relation Age of Onset     Alzheimer's disease Mother     Cancer Father     Stroke Brother     Cancer Maternal Grandmother     Coronary artery disease Maternal Grandfather     Heart disease Maternal Grandfather     Alzheimer's disease Other     Leukemia Other     Breast cancer Neg Hx     Ovarian cancer Neg Hx        Current Medications:    Current Outpatient Medications:     acetaminophen (TYLENOL) 325 MG tablet, Take 1 tablet by mouth Every 6 (Six) Hours As Needed., Disp: , Rfl:     acyclovir (Zovirax) 200 MG capsule, Take 2 capsules by mouth 2 (Two) Times a Day., Disp: 180 capsule, Rfl: 3    ALPRAZolam (XANAX) 0.5 MG tablet, Take 1 tablet by mouth 2 (Two) Times a Day As Needed for Anxiety., Disp: 30 tablet, Rfl: 2    Boswellia-Glucosamine-Vit D (OSTEO BI-FLEX ONE PER DAY PO), Take 1 tablet by mouth Daily., Disp: , Rfl:     coenzyme Q10 100 MG capsule, Take 1 capsule by mouth Daily. Once daily, Disp: , Rfl:     dicyclomine (BENTYL) 10 MG capsule, TAKE 1 CAPSULE BY MOUTH EVERY  NIGHT, Disp: 90 capsule, Rfl: 1    Eliquis 5 MG tablet tablet, Take 1 tablet by mouth 2 (Two) Times a Day., Disp: , Rfl:     ketoconazole (NIZORAL) 2 % cream, APPLY TOPICALLY TO THE  APPROPRIATE AREA AS DIRECTED  EVERY 12 HOURS, Disp: 60 g, Rfl: 1    ketoconazole (NIZORAL) 2 % shampoo, Apply  topically to the appropriate area as directed 1 (One) Time Per Week., Disp: 1 each, Rfl: 3    levothyroxine (SYNTHROID, LEVOTHROID) 88 MCG tablet, Take 1 tablet by mouth Daily., Disp: 90 tablet, Rfl: 3    Loratadine 10 MG capsule, Take 1 capsule by mouth Daily., Disp: , Rfl:     magnesium oxide (MAG-OX) 400 MG tablet, Take 1 tablet by mouth 3 (Three) Times a Day. 500 mg tid, Disp: , Rfl:     metoprolol succinate XL (TOPROL-XL) 25 MG 24 hr tablet, Take 1 tablet by mouth every night at bedtime., Disp: , Rfl:     pantoprazole (PROTONIX) 40 MG EC tablet, Take 1 tablet by mouth Daily., Disp: , Rfl:     Bempedoic Acid-Ezetimibe 180-10 MG tablet, Take 1 tablet by mouth Daily., Disp: 30  "tablet, Rfl: 11    clobetasol (TEMOVATE) 0.05 % cream, clobetasol 0.05 % topical cream (Patient not taking: Reported on 9/3/2024), Disp: , Rfl:      Review of Systems   Cardiovascular: Negative.    Respiratory: Negative.     Musculoskeletal:  Positive for arthritis.       Vitals:    09/03/24 1519   BP: 122/80   BP Location: Right arm   Patient Position: Sitting   Cuff Size: Adult   Pulse: 77   SpO2: 95%   Weight: 63.7 kg (140 lb 6.4 oz)   Height: 160 cm (63\")       Physical Exam  Constitutional:       Appearance: Normal appearance.   Cardiovascular:      Rate and Rhythm: Normal rate and regular rhythm.      Pulses: Normal pulses.      Heart sounds: Normal heart sounds.   Pulmonary:      Effort: Pulmonary effort is normal.      Breath sounds: Normal breath sounds.   Musculoskeletal:      Right lower leg: No edema.      Left lower leg: No edema.   Skin:     Comments: Venous stasis changes bilateral lower extremity   Neurological:      Mental Status: She is alert.         Diagnostic Data:  Procedures  Lab Results   Component Value Date    TRIG 60 08/09/2024     (H) 08/09/2024     Lab Results   Component Value Date    GLUCOSE 95 08/09/2024    BUN 15 08/09/2024    CREATININE 0.95 08/09/2024     (L) 08/09/2024    K 4.3 08/09/2024    CL 94 (L) 08/09/2024    CO2 25.0 08/09/2024     Lab Results   Component Value Date    HGBA1C 5.80 (H) 08/09/2024     Lab Results   Component Value Date    WBC 5.46 08/09/2024    HGB 16.9 (H) 08/09/2024    HCT 51.4 (H) 08/09/2024     08/09/2024       Assessment:   Diagnosis Plan   1. Paroxysmal atrial fibrillation  Adult Transthoracic Echo Complete W/ Cont if Necessary Per Protocol          Plan:    1.  Paroxysmal A. Fib,  Stroke related to atrial fibrillation  -Stroke affected the left occipital lobe  -[id number] 6 (female, age greater than 75, hypertension, stroke)  -Continue Toprol-XL and Eliquis 5 mg dosing (monitor weight moving forward)  -Currently asymptomatic and " in sinus rhythm     2. HTN  -controlled no changes made     3.  HLD  -Myalgias with statins previously.    -Total cholesterol 228, ,   -will change Zetia to Nexletol for better LDL control    4.  Preop evaluation for shoulder surgery  -Will obtain follow-up echocardiogram.  Further recommendations pending review of the echo.    ACP discussion was held with the patient during this visit. Patient has an advance directive in EMR which is still valid.       Vipul Olivier MD FACC

## 2024-09-03 ENCOUNTER — OFFICE VISIT (OUTPATIENT)
Dept: CARDIOLOGY | Facility: CLINIC | Age: 84
End: 2024-09-03
Payer: MEDICARE

## 2024-09-03 VITALS
WEIGHT: 140.4 LBS | HEIGHT: 63 IN | DIASTOLIC BLOOD PRESSURE: 80 MMHG | HEART RATE: 77 BPM | OXYGEN SATURATION: 95 % | BODY MASS INDEX: 24.88 KG/M2 | SYSTOLIC BLOOD PRESSURE: 122 MMHG

## 2024-09-03 DIAGNOSIS — I10 PRIMARY HYPERTENSION: ICD-10-CM

## 2024-09-03 DIAGNOSIS — E78.00 PURE HYPERCHOLESTEROLEMIA: ICD-10-CM

## 2024-09-03 DIAGNOSIS — I48.0 PAROXYSMAL ATRIAL FIBRILLATION: Primary | ICD-10-CM

## 2024-09-03 PROCEDURE — 99214 OFFICE O/P EST MOD 30 MIN: CPT | Performed by: INTERNAL MEDICINE

## 2024-09-03 PROCEDURE — 3074F SYST BP LT 130 MM HG: CPT | Performed by: INTERNAL MEDICINE

## 2024-09-03 PROCEDURE — 3079F DIAST BP 80-89 MM HG: CPT | Performed by: INTERNAL MEDICINE

## 2024-09-09 RX ORDER — LEVOTHYROXINE SODIUM 88 UG/1
88 TABLET ORAL DAILY
Qty: 90 TABLET | Refills: 3 | Status: SHIPPED | OUTPATIENT
Start: 2024-09-09

## 2024-09-12 ENCOUNTER — TELEPHONE (OUTPATIENT)
Dept: INTERNAL MEDICINE | Facility: CLINIC | Age: 84
End: 2024-09-12
Payer: MEDICARE

## 2024-09-12 DIAGNOSIS — Z78.0 MENOPAUSE: Primary | ICD-10-CM

## 2024-09-12 NOTE — TELEPHONE ENCOUNTER
Caller: Alanna Rodriges    Relationship: Self    Best call back number: [phone]       What was the call regarding: PATIENT STATES THAT MYCHART HAS SAID THAT SHE IS OVERDUE FOR A BONE DENSITY TEST AND SHE WANTS A CALL BACK TO LET HER KNOW IF SHE NEEDS THIS TEST    Is it okay if the provider responds through [employer]:

## 2024-09-24 ENCOUNTER — HOSPITAL ENCOUNTER (OUTPATIENT)
Dept: CARDIOLOGY | Facility: HOSPITAL | Age: 84
Discharge: HOME | End: 2024-09-24
Admitting: INTERNAL MEDICINE
Payer: MEDICARE

## 2024-09-24 VITALS
DIASTOLIC BLOOD PRESSURE: 77 MMHG | HEIGHT: 63 IN | SYSTOLIC BLOOD PRESSURE: 140 MMHG | BODY MASS INDEX: 24.8 KG/M2 | WEIGHT: 140 LBS

## 2024-09-24 DIAGNOSIS — I48.0 PAROXYSMAL ATRIAL FIBRILLATION: ICD-10-CM

## 2024-09-24 LAB
ASCENDING AORTA: 3.7 CM
BH CV ECHO MEAS - AO MAX PG: 3.5 MMHG
BH CV ECHO MEAS - AO MEAN PG: 2 MMHG
BH CV ECHO MEAS - AO ROOT DIAM: 2.9 CM
BH CV ECHO MEAS - AO V2 MAX: 93.2 CM/SEC
BH CV ECHO MEAS - AVA(I,D): 1.2 CM2
BH CV ECHO MEAS - EDV(CUBED): 68.1 ML
BH CV ECHO MEAS - EF(MOD-BP): 55 %
BH CV ECHO MEAS - EF(MOD-SP2): 56 %
BH CV ECHO MEAS - EF(MOD-SP4): 51.7 %
BH CV ECHO MEAS - IVS/LVPW: 1 CM
BH CV ECHO MEAS - IVSD: 0.9 CM
BH CV ECHO MEAS - LA DIMENSION: 3.3 CM
BH CV ECHO MEAS - LAT PEAK E' VEL: 7.8 CM/SEC
BH CV ECHO MEAS - LV MASS(C)D: 112.1 GRAMS
BH CV ECHO MEAS - LV MAX PG: 0.87 MMHG
BH CV ECHO MEAS - LV MEAN PG: 0.5 MMHG
BH CV ECHO MEAS - LV V1 MAX: 46.6 CM/SEC
BH CV ECHO MEAS - LV V1 VTI: 10.2 CM
BH CV ECHO MEAS - LVIDD: 4.1 CM
BH CV ECHO MEAS - LVIDS: 2.6 CM
BH CV ECHO MEAS - LVOT DIAM: 1.8 CM
BH CV ECHO MEAS - LVPWD: 0.9 CM
BH CV ECHO MEAS - MED PEAK E' VEL: 9.4 CM/SEC
BH CV ECHO MEAS - MV A MAX VEL: 60.1 CM/SEC
BH CV ECHO MEAS - MV E MAX VEL: 61.4 CM/SEC
BH CV ECHO MEAS - MV E/A: 1.02
BH CV ECHO MEAS - MV MAX PG: 3 MMHG
BH CV ECHO MEAS - MV MEAN PG: 1.1 MMHG
BH CV ECHO MEAS - PA ACC TIME: 0.11 SEC
BH CV ECHO MEAS - RAP SYSTOLE: 8 MMHG
BH CV ECHO MEAS - RVSP: 34 MMHG
BH CV ECHO MEAS - TAPSE (>1.6): 2.01 CM
BH CV ECHO MEAS - TR MAX PG: 25.9 MMHG
BH CV ECHO MEAS - TR MAX VEL: 254.4 CM/SEC
BH CV ECHO MEASUREMENTS AVERAGE E/E' RATIO: 7.14
BH CV VAS BP LEFT ARM: NORMAL MMHG
BH CV XLRA - RV BASE: 3.3 CM
BH CV XLRA - TDI S': 14.8 CM/SEC

## 2024-09-24 PROCEDURE — 93306 TTE W/DOPPLER COMPLETE: CPT

## 2024-09-24 PROCEDURE — 93306 TTE W/DOPPLER COMPLETE: CPT | Performed by: INTERNAL MEDICINE

## 2024-09-25 RX ORDER — PANTOPRAZOLE SODIUM 40 MG/1
40 TABLET, DELAYED RELEASE ORAL DAILY
Qty: 90 TABLET | Refills: 3 | Status: SHIPPED | OUTPATIENT
Start: 2024-09-25 | End: 2024-09-27 | Stop reason: SDUPTHER

## 2024-09-27 ENCOUNTER — TELEPHONE (OUTPATIENT)
Dept: INTERNAL MEDICINE | Facility: CLINIC | Age: 84
End: 2024-09-27
Payer: MEDICARE

## 2024-09-27 RX ORDER — PANTOPRAZOLE SODIUM 40 MG/1
40 TABLET, DELAYED RELEASE ORAL DAILY
Qty: 90 TABLET | Refills: 3 | Status: SHIPPED | OUTPATIENT
Start: 2024-09-27

## 2024-11-04 RX ORDER — KETOCONAZOLE 20 MG/ML
SHAMPOO TOPICAL WEEKLY
Qty: 1 EACH | Refills: 3 | Status: SHIPPED | OUTPATIENT
Start: 2024-11-04

## 2024-11-04 RX ORDER — ACYCLOVIR 200 MG/1
400 CAPSULE ORAL 2 TIMES DAILY
Qty: 180 CAPSULE | Refills: 3 | Status: SHIPPED | OUTPATIENT
Start: 2024-11-04

## 2024-11-04 NOTE — TELEPHONE ENCOUNTER
Caller: Alanna Rodriges    Relationship: Self    Best call back number: [phone]     Requested Prescriptions:   Requested Prescriptions     Pending Prescriptions Disp Refills    acyclovir (Zovirax) 200 MG capsule 180 capsule 3     Sig: Take 2 capsules by mouth 2 (Two) Times a Day.    ketoconazole (NIZORAL) 2 % shampoo 1 each 3     Sig: Apply  topically to the appropriate area as directed 1 (One) Time Per Week.        Pharmacy where request should be sent: [facility] - [place], [place] - [address] [facility] [phone] [facility] [phone] FX     Last office visit with prescribing clinician: 8/13/2024   Last telemedicine visit with prescribing clinician: Visit date not found   Next office visit with prescribing clinician: 8/19/2025       Does the patient have less than a 3 day supply:  [] Yes  [x] No        Alexandro Rodriguez Rep   11/04/24 15:39 EST

## 2024-12-09 ENCOUNTER — TELEPHONE (OUTPATIENT)
Dept: CARDIOLOGY | Facility: CLINIC | Age: 84
End: 2024-12-09
Payer: MEDICARE

## 2024-12-18 ENCOUNTER — TELEPHONE (OUTPATIENT)
Dept: CARDIOLOGY | Facility: CLINIC | Age: 84
End: 2024-12-18
Payer: MEDICARE

## 2024-12-18 NOTE — TELEPHONE ENCOUNTER
Alanna Rodriges (Key: [id number]) - [id number]  Nexlizet 180-10MG tablets  status: PA RequestCreated: December 18th, 2024Sent: December 18th, 2024        Nexlizet 180-10MG tablets  status: PA Response - ApprovedCreated: December 18th, 2024Sent: December 18th, 2024

## 2025-01-02 ENCOUNTER — TELEPHONE (OUTPATIENT)
Dept: CARDIOLOGY | Facility: CLINIC | Age: 85
End: 2025-01-02
Payer: MEDICARE

## 2025-01-02 RX ORDER — METOPROLOL SUCCINATE 25 MG/1
25 TABLET, EXTENDED RELEASE ORAL
Qty: 90 TABLET | Refills: 1 | Status: SHIPPED | OUTPATIENT
Start: 2025-01-02 | End: 2027-06-29

## 2025-01-02 NOTE — TELEPHONE ENCOUNTER
Patient called-requesting refills be sent to [facility] for her Toprol XL. Informed patient I will send this in- patient verbalizes understanding.

## 2025-02-17 RX ORDER — DICYCLOMINE HYDROCHLORIDE 10 MG/1
10 CAPSULE ORAL NIGHTLY
Qty: 90 CAPSULE | Refills: 1 | Status: SHIPPED | OUTPATIENT
Start: 2025-02-17

## 2025-03-02 DIAGNOSIS — M47.26 OSTEOARTHRITIS OF SPINE WITH RADICULOPATHY, LUMBAR REGION: ICD-10-CM

## 2025-03-03 NOTE — TELEPHONE ENCOUNTER
Rx Refill Note  Requested Prescriptions     Pending Prescriptions Disp Refills    gabapentin (NEURONTIN) 100 MG capsule [Pharmacy Med Name: Gabapentin 100 MG Oral Capsule] 270 capsule 3     Sig: TAKE 1 CAPSULE BY MOUTH 3 TIMES  DAILY      Last office visit with prescribing clinician: 8/13/2024   Last telemedicine visit with prescribing clinician: Visit date not found   Next office visit with prescribing clinician: 8/19/2025                         Would you like a call back once the refill request has been completed: [] Yes [] No    If the office needs to give you a call back, can they leave a voicemail: [] Yes [] No    Ashley Preciado MA  03/03/25, 09:01 EST

## 2025-03-04 RX ORDER — GABAPENTIN 100 MG/1
100 CAPSULE ORAL 3 TIMES DAILY
Qty: 270 CAPSULE | Refills: 3 | OUTPATIENT
Start: 2025-03-04

## 2025-06-15 ENCOUNTER — DOCUMENTATION (OUTPATIENT)
Dept: INTERNAL MEDICINE | Facility: CLINIC | Age: 85
End: 2025-06-15
Payer: MEDICARE

## 2025-06-15 DIAGNOSIS — F41.1 ANXIETY STATE: ICD-10-CM

## 2025-06-15 DIAGNOSIS — F41.1 ANXIETY STATE: Primary | ICD-10-CM

## 2025-06-15 RX ORDER — ALPRAZOLAM 0.5 MG
0.5 TABLET ORAL 2 TIMES DAILY PRN
Qty: 1 TABLET | Refills: 0 | Status: CANCELLED | OUTPATIENT
Start: 2025-06-15

## 2025-06-15 RX ORDER — ALPRAZOLAM 0.5 MG
0.5 TABLET ORAL 2 TIMES DAILY PRN
Qty: 1 TABLET | Refills: 0 | Status: SHIPPED | OUTPATIENT
Start: 2025-06-15

## 2025-06-15 RX ORDER — ALPRAZOLAM 0.5 MG
0.5 TABLET ORAL 2 TIMES DAILY PRN
Qty: 1 TABLET | Refills: 0 | Status: SHIPPED | OUTPATIENT
Start: 2025-06-15 | End: 2025-06-15

## 2025-06-16 ENCOUNTER — TELEPHONE (OUTPATIENT)
Dept: INTERNAL MEDICINE | Facility: CLINIC | Age: 85
End: 2025-06-16

## 2025-06-16 NOTE — TELEPHONE ENCOUNTER
Pharmacy Name:  [facility]       Pharmacy representative name: ERIN    Pharmacy representative phone number: [phone]     What medication are you calling in regards to: ALPRAZolam (XANAX) 0.5 MG tablet     What question does the pharmacy have: DIRECTIONS SAY Take 1 tablet by mouth 2 (Two) Times a Day As Needed for Anxiety.  BUT DISPENSE SAID ONLY 1 TABLET. THINKS ITS A TYPO BUT HAS TO BE SURE BEFORE FILLING THIS. PLEASE CALL TO CLARIFY     Who is the provider that prescribed the medication: DR CORRAL     Additional notes: PLEASE CALL TO CLARIFY AMOUNT PRESCRIBED

## 2025-06-17 DIAGNOSIS — F41.1 ANXIETY STATE: ICD-10-CM

## 2025-06-17 NOTE — TELEPHONE ENCOUNTER
Caller: Rodriges Alannagómez Dee    Relationship: Self    Best call back number: [phone]    Requested Prescriptions:   Requested Prescriptions     Pending Prescriptions Disp Refills    ALPRAZolam (XANAX) 0.5 MG tablet 1 tablet 0     Sig: Take 1 tablet by mouth 2 (Two) Times a Day As Needed for Anxiety.        [facility] be sent: [facility]  [address] [facility] [address] [phone] [facility] [phone]      Last office visit with prescribing clinician: 8/13/2024   Last telemedicine visit with prescribing clinician: Visit date not found   Next office visit with prescribing clinician: 8/26/2025     Additional details provided by patient: PATIENT IS NOT IN [place] YET REQUESTING TO GET MEDICATION SENT HERE     Does the patient have less than a 3 day supply:  [x] Yes  [] No    Would you like a call back once the refill request has been completed: [] Yes [x] No    If the office needs to give you a call back, can they leave a voicemail: [] Yes [x] No    Serenity Abad, [facility] CenterArik Rep   06/17/25 16:02 EDT

## 2025-06-18 RX ORDER — ALPRAZOLAM 0.5 MG
0.5 TABLET ORAL 2 TIMES DAILY PRN
Qty: 60 TABLET | Refills: 2 | Status: SHIPPED | OUTPATIENT
Start: 2025-06-18

## 2025-06-23 RX ORDER — BEMPEDOIC ACID AND EZETIMIBE 180; 10 MG/1; MG/1
1 TABLET, FILM COATED ORAL DAILY
Qty: 90 TABLET | Refills: 3 | Status: SHIPPED | OUTPATIENT
Start: 2025-06-23

## 2025-06-27 ENCOUNTER — HOSPITAL ENCOUNTER (OUTPATIENT)
Dept: BONE DENSITY | Facility: HOSPITAL | Age: 85
Discharge: HOME | End: 2025-06-27
Payer: MEDICARE

## 2025-06-27 DIAGNOSIS — Z78.0 MENOPAUSE: ICD-10-CM

## 2025-06-27 PROCEDURE — 77080 DXA BONE DENSITY AXIAL: CPT

## 2025-06-30 ENCOUNTER — TRANSCRIBE ORDERS (OUTPATIENT)
Dept: ADMINISTRATIVE | Facility: HOSPITAL | Age: 85
End: 2025-06-30
Payer: MEDICARE

## 2025-06-30 DIAGNOSIS — Z12.31 ENCOUNTER FOR SCREENING MAMMOGRAM FOR MALIGNANT NEOPLASM OF BREAST: Primary | ICD-10-CM

## 2025-07-11 ENCOUNTER — OFFICE VISIT (OUTPATIENT)
Dept: INTERNAL MEDICINE | Facility: CLINIC | Age: 85
End: 2025-07-11
Payer: MEDICARE

## 2025-07-11 VITALS
OXYGEN SATURATION: 96 % | TEMPERATURE: 98.6 F | SYSTOLIC BLOOD PRESSURE: 128 MMHG | HEIGHT: 63 IN | HEART RATE: 79 BPM | BODY MASS INDEX: 25.45 KG/M2 | WEIGHT: 143.6 LBS | DIASTOLIC BLOOD PRESSURE: 67 MMHG

## 2025-07-11 DIAGNOSIS — J01.00 ACUTE NON-RECURRENT MAXILLARY SINUSITIS: ICD-10-CM

## 2025-07-11 DIAGNOSIS — R09.81 CONGESTION OF NASAL SINUS: Primary | ICD-10-CM

## 2025-07-11 LAB
EXPIRATION DATE: NORMAL
FLUAV AG UPPER RESP QL IA.RAPID: NOT DETECTED
FLUBV AG UPPER RESP QL IA.RAPID: NOT DETECTED
INTERNAL CONTROL: NORMAL
Lab: NORMAL
SARS-COV-2 AG UPPER RESP QL IA.RAPID: NOT DETECTED

## 2025-07-11 PROCEDURE — 3074F SYST BP LT 130 MM HG: CPT | Performed by: STUDENT IN AN ORGANIZED HEALTH CARE EDUCATION/TRAINING PROGRAM

## 2025-07-11 PROCEDURE — 99213 OFFICE O/P EST LOW 20 MIN: CPT | Performed by: STUDENT IN AN ORGANIZED HEALTH CARE EDUCATION/TRAINING PROGRAM

## 2025-07-11 PROCEDURE — 1160F RVW MEDS BY RX/DR IN RCRD: CPT | Performed by: STUDENT IN AN ORGANIZED HEALTH CARE EDUCATION/TRAINING PROGRAM

## 2025-07-11 PROCEDURE — 87428 SARSCOV & INF VIR A&B AG IA: CPT | Performed by: STUDENT IN AN ORGANIZED HEALTH CARE EDUCATION/TRAINING PROGRAM

## 2025-07-11 PROCEDURE — 1159F MED LIST DOCD IN RCRD: CPT | Performed by: STUDENT IN AN ORGANIZED HEALTH CARE EDUCATION/TRAINING PROGRAM

## 2025-07-11 PROCEDURE — 1126F AMNT PAIN NOTED NONE PRSNT: CPT | Performed by: STUDENT IN AN ORGANIZED HEALTH CARE EDUCATION/TRAINING PROGRAM

## 2025-07-11 PROCEDURE — 3078F DIAST BP <80 MM HG: CPT | Performed by: STUDENT IN AN ORGANIZED HEALTH CARE EDUCATION/TRAINING PROGRAM

## 2025-07-11 RX ORDER — AZITHROMYCIN 250 MG/1
TABLET, FILM COATED ORAL
Qty: 6 TABLET | Refills: 0 | Status: SHIPPED | OUTPATIENT
Start: 2025-07-11

## 2025-07-11 NOTE — PROGRESS NOTES
Office Note     Name: Alanna Rodriges    : 1940     MRN: [id number]     Chief Complaint  Nasal Congestion and Fatigue    Subjective     History of Present Illness:  Alanna Rodriges is a 84 y.o. female who presents today for nasal congestion and fatigue    History of Present Illness  The patient presents for evaluation of congestion.     She initially suspected a severe allergic reaction, but her symptoms have progressively worsened. She reports a runny nose, frequent sneezing, hoarseness, and pressure in her ears. She also experiences fatigue.  A cough has recently developed, which is productive in the mornings due to her allergies. She has not experienced any fevers or chills, but occasionally feels hot at night. Her symptoms began on Monday night, 2025. Her  tested positive for COVID-19 on Tuesday, 2025. She has no history of lung conditions such as asthma or COPD. She has been using Flonase for her allergies and takes Claritin daily, although she finds it ineffective. She has recently started using Xyzal.    She had a stroke 3 years ago and has residual imbalance from that, which could be due to the medication she is taking.    Review of Systems:   Review of Systems   Constitutional:  Positive for fatigue. Negative for chills and fever.   HENT:  Positive for congestion, postnasal drip, rhinorrhea, sinus pressure and sore throat.    Respiratory:  Positive for cough. Negative for shortness of breath and wheezing.        Past Medical History:   Past Medical History:   Diagnosis Date    Anemia     Anxiety disorder     Arrhythmia 2021    Arthritis     Asthma     Atrial fibrillation     Cataract     Cirrhosis     Claustrophobia     Colitis     COVID 2022    Depression     Dermatitis     DVT (deep venous thrombosis)     1975    Elbow pain     GERD (gastroesophageal reflux disease)     History of blood transfusion     History of hepatitis C virus infection      Hypothyroidism     Kidney stones     Migraine     occular    PONV (postoperative nausea and vomiting)     Ptosis due to aging     Seborrhea     Seizure     during hospitalization as a side effect of Tofranil     Shoulder pain     right    Stroke 3022    Ulcerative colitis     Ulnar nerve compression, right        Past Surgical History:   Past Surgical History:   Procedure Laterality Date    BUNIONECTOMY Bilateral     CARPAL TUNNEL RELEASE Right 2018    Procedure: CARPAL TUNNEL RELEASE RIGHT ;  Surgeon: Edmond Mccrary MD;  Location:  [facility];  Service:     CARPAL TUNNEL RELEASE Right     CATARACT EXTRACTION, BILATERAL Bilateral 2020    in [place]    COLECTOMY TOTAL      COLON SURGERY  1975    resection    D & C AND LAPAROSCOPY      FACELIFT  2015    chemical peel    HYSTERECTOMY      bso    ILEOSTOMY      OOPHORECTOMY  1994    SKIN BIOPSY      ULNAR NERVE DECOMPRESSION Right 2016    Procedure: RIGHT ULNAR NERVE DECOMPRESSION;  Surgeon: Edmond Mccrary MD;  Location:  [facility];  Service:     URETHRAL DILATION      multiple       Family History:   Family History   Problem Relation Age of Onset    Alzheimer's disease Mother     Cancer Father     Stroke Brother     Cancer Maternal Grandmother     Coronary artery disease Maternal Grandfather     Heart disease Maternal Grandfather     Alzheimer's disease Other     Leukemia Other     Breast cancer Neg Hx     Ovarian cancer Neg Hx        Social History:   Social History     Socioeconomic History    Marital status:    Tobacco Use    Smoking status: Former     Current packs/day: 0.00     Average packs/day: 0.5 packs/day for 30.0 years (15.0 ttl pk-yrs)     Types: Cigarettes     Start date:      Quit date:      Years since quittin.5    Smokeless tobacco: Never    Tobacco comments:     Less thanten per day   Vaping Use    Vaping status: Never Used   Substance and Sexual Activity    Alcohol use: Yes      Alcohol/week: 14.0 standard drinks of alcohol     Types: 14 Glasses of wine per week    Drug use: No    Sexual activity: Yes     Partners: Male     Birth control/protection: Hysterectomy       Immunizations:   Immunization History   Administered Date(s) Administered    ABRYSVO (RSV, 60+ or pregnant women 32-36 wks) 10/20/2023    COVID-19 (PFIZER) 12YRS+ (COMIRNATY) 09/26/2023    COVID-19 (PFIZER) Purple Cap Monovalent 02/06/2021, 02/27/2021, 08/23/2021    FLUAD TRI 65YR+ 09/24/2019    Fluad Quad 65+ 10/03/2020, 10/20/2023    Fluzone High-Dose 65+YRS 09/19/2014, 09/23/2016, 10/06/2017, 10/01/2018    Fluzone High-Dose 65+yrs 09/30/2021, 10/04/2022    Fluzone Quad >6mos (Multi-dose) 10/01/2015    INFLUENZA SPLIT TRI 09/13/2012, 10/04/2013    Influenza TIV (IM) 09/04/2009, 12/27/2011    Pneumococcal Conjugate 13-Valent (PCV13) 08/12/2015    Pneumococcal Conjugate 20-Valent (PCV20) 10/26/2023    Pneumococcal Polysaccharide (PPSV23) 07/16/2012    Shingrix 07/25/2018, 05/17/2019    Tdap 09/13/2012, 03/20/2020    Zostavax 08/12/2011        Medications:     Current Outpatient Medications:     acetaminophen (TYLENOL) 325 MG tablet, Take 1 tablet by mouth Every 6 (Six) Hours As Needed., Disp: , Rfl:     acyclovir (Zovirax) 200 MG capsule, Take 2 capsules by mouth 2 (Two) Times a Day., Disp: 180 capsule, Rfl: 3    ALPRAZolam (XANAX) 0.5 MG tablet, Take 1 tablet by mouth 2 (Two) Times a Day As Needed for Anxiety., Disp: 60 tablet, Rfl: 2    Boswellia-Glucosamine-Vit D (OSTEO BI-FLEX ONE PER DAY PO), Take 1 tablet by mouth Daily., Disp: , Rfl:     coenzyme Q10 100 MG capsule, Take 1 capsule by mouth Daily. Once daily, Disp: , Rfl:     dicyclomine (BENTYL) 10 MG capsule, TAKE 1 CAPSULE BY MOUTH EVERY  NIGHT, Disp: 90 capsule, Rfl: 1    Eliquis 5 MG tablet tablet, Take 1 tablet by mouth 2 (Two) Times a Day., Disp: , Rfl:     ketoconazole (NIZORAL) 2 % cream, APPLY TOPICALLY TO THE  APPROPRIATE AREA AS DIRECTED  EVERY 12 HOURS,  "Disp: 60 g, Rfl: 1    ketoconazole (NIZORAL) 2 % shampoo, Apply  topically to the appropriate area as directed 1 (One) Time Per Week., Disp: 1 each, Rfl: 3    levothyroxine (SYNTHROID, LEVOTHROID) 88 MCG tablet, TAKE 1 TABLET BY MOUTH DAILY, Disp: 90 tablet, Rfl: 3    Loratadine 10 MG capsule, Take 1 capsule by mouth Daily., Disp: , Rfl:     magnesium oxide (MAG-OX) 400 MG tablet, Take 1 tablet by mouth 3 (Three) Times a Day. 500 mg tid, Disp: , Rfl:     metoprolol succinate XL (TOPROL-XL) 25 MG 24 hr tablet, Take 1 tablet by mouth every night at bedtime., Disp: 90 tablet, Rfl: 1    Nexlizet 180-10 MG tablet, TAKE 1 TABLET BY MOUTH DAILY, Disp: 90 tablet, Rfl: 3    pantoprazole (PROTONIX) 40 MG EC tablet, Take 1 tablet by mouth Daily., Disp: 90 tablet, Rfl: 3    azithromycin (Zithromax Z-Zane) 250 MG tablet, Take 2 tablets by mouth on day 1, then 1 tablet daily on days 2-5, Disp: 6 tablet, Rfl: 0    clobetasol (TEMOVATE) 0.05 % cream, clobetasol 0.05 % topical cream (Patient not taking: Reported on 7/11/2025), Disp: , Rfl:     Allergies:   Allergies   Allergen Reactions    Codeine Nausea Only    Morphine And Codeine Hallucinations     And itching After 3 days       Objective     Vital Signs  /67 (BP Location: Left arm, Patient Position: Sitting, Cuff Size: Adult)   Pulse 79   Temp 98.6 °F (37 °C)   Ht 160 cm (62.99\")   Wt 65.1 kg (143 lb 9.6 oz)   SpO2 96%   BMI 25.44 kg/m²   Body mass index is 25.44 kg/m².            Physical Exam  Constitutional:       Appearance: Normal appearance.   HENT:      Head: Normocephalic and atraumatic.      Mouth/Throat:      Pharynx: Posterior oropharyngeal erythema present. No oropharyngeal exudate.   Eyes:      Extraocular Movements: Extraocular movements intact.      Conjunctiva/sclera: Conjunctivae normal.   Pulmonary:      Effort: Pulmonary effort is normal. No respiratory distress.      Breath sounds: Normal breath sounds. No wheezing.   Neurological:      General: No " focal deficit present.      Mental Status: She is alert and oriented to person, place, and time.   Psychiatric:         Mood and Affect: Mood normal.         Behavior: Behavior normal.            Results:  Recent Results (from the past 24 hours)   POCT SARS-CoV-2 + Flu Antigen ERNESTO    Collection Time: 07/11/25  3:14 PM    Specimen: Swab   Result Value Ref Range    SARS Antigen Not Detected Not Detected, Presumptive Negative    Influenza A Antigen ERNESTO Not Detected Not Detected    Influenza B Antigen ERNESTO Not Detected Not Detected    Internal Control Passed Passed    Lot Number [id number]     Expiration Date 03/05/2026         Assessment and Plan     Assessment/Plan:  Diagnoses and all orders for this visit:    1. Congestion of nasal sinus (Primary)  -     POCT SARS-CoV-2 + Flu Antigen ERNESTO    2. Acute non-recurrent maxillary sinusitis  - Discussed that symptoms are most likely due to an acute viral URI, due to patient preference will prescribe antibiotic to be taken only if symptoms have not improved after 7 to 10 days of supportive care.  - Patient was tested for COVID and flu and results were negative.  - Advised patient on supportive therapies, including over-the-counter acetaminophen or ibuprofen for fever and bodyaches, maintaining adequate hydration.    - Although patient's  recently tested positive for COVID since her testing is negative I have advised against empirically taking Paxlovid given the risks associated with medication interactions as she is currently on Eliquis.  -     azithromycin (Zithromax Z-Zane) 250 MG tablet; Take 2 tablets by mouth on day 1, then 1 tablet daily on days 2-5  Dispense: 6 tablet; Refill: 0      Follow Up  No follow-ups on file.    Patient or patient representative verbalized consent for the use of Ambient Listening during the visit with  Tomasa Castano MD for chart documentation. 7/11/2025  15:31 EDT      Tomasa Castano MD   [facility] [place] [address]

## 2025-07-21 ENCOUNTER — TELEPHONE (OUTPATIENT)
Dept: INTERNAL MEDICINE | Facility: CLINIC | Age: 85
End: 2025-07-21
Payer: MEDICARE

## 2025-07-21 NOTE — TELEPHONE ENCOUNTER
Sent paxlovid to [facility].  Let her know to reduce eliquis to once a day during the 5 days she is on paxlovid.

## 2025-07-21 NOTE — TELEPHONE ENCOUNTER
Her last GFR was 60.3, which was a fasting specimen, so even though she is on the border she should do ok with the full dose

## 2025-07-21 NOTE — TELEPHONE ENCOUNTER
SHAUN FROM [facility] CALLED ABOUT THE NOTE LEFT ON THE PAXLOVID. HE WAS WANTING TO KNOW IF WE WANTED TO DO THE FULL DOSE OR REDUCE THE DOSE DUE TO KIDNEY FUNCTION?    CAN WE GIVE HIM A CALL BACK TO UPDATE HIM ON THIS PLEASE?    [phone]

## 2025-07-21 NOTE — TELEPHONE ENCOUNTER
Pt tested positive at home today.  Symptoms include fever, runny nose, sore throat, sinus pressure and headache.  She stated the 2 previous time she had covid, the medicine helped.  Please advise.

## 2025-07-21 NOTE — TELEPHONE ENCOUNTER
Caller: Alanna Rodriges    Relationship: Self    Best call back number: [phone]     What medication are you requesting: PAXLOVID    What are your current symptoms: COVID    How long have you been experiencing symptoms: TODAY    Have you had these symptoms before:    [x] Yes  [] No    Have you been treated for these symptoms before:   [x] Yes  [] No    If a prescription is needed, what is your preferred pharmacy and phone number: [facility] [id number] - [place] [address] [address] - [phone]  - [phone] FX     Additional notes: TESTED POSITIVE FOR COVID TODAY.

## 2025-08-01 ENCOUNTER — HOSPITAL ENCOUNTER (OUTPATIENT)
Dept: MAMMOGRAPHY | Facility: HOSPITAL | Age: 85
Discharge: HOME | End: 2025-08-01
Admitting: INTERNAL MEDICINE
Payer: MEDICARE

## 2025-08-01 DIAGNOSIS — Z12.31 ENCOUNTER FOR SCREENING MAMMOGRAM FOR MALIGNANT NEOPLASM OF BREAST: ICD-10-CM

## 2025-08-01 PROCEDURE — 77063 BREAST TOMOSYNTHESIS BI: CPT

## 2025-08-01 PROCEDURE — 77067 SCR MAMMO BI INCL CAD: CPT

## (undated) DEVICE — APPL CHLORAPREP W/TINT 26ML BLU

## (undated) DEVICE — ENCORE® LATEX MICRO SIZE 7.5, STERILE LATEX POWDER-FREE SURGICAL GLOVE: Brand: ENCORE

## (undated) DEVICE — SYR CONTRL LUERLOK 10CC

## (undated) DEVICE — DISPOSABLE BIPOLAR FORCEPS 5 1/2" (14CM) SEMKIN, 0.7MM TIP AND 12 FT. (3.6M) CABLE: Brand: KIRWAN

## (undated) DEVICE — SPNG GZ STRL 2S 4X4 12PLY

## (undated) DEVICE — CANN NASL CO2 DIVIDED A/

## (undated) DEVICE — ANTIBACTERIAL UNDYED BRAIDED (POLYGLACTIN 910), SYNTHETIC ABSORBABLE SUTURE: Brand: COATED VICRYL

## (undated) DEVICE — SENSR O2 OXIMAX FNGR A/ 18IN NONSTR

## (undated) DEVICE — ADAPT ST INFUS ADMIN SYR 70IN

## (undated) DEVICE — SUT ETHLN 4/0 PS2 18IN 1667H

## (undated) DEVICE — STCKNT STRL 4X48 IN

## (undated) DEVICE — AIRWY 90MM NO9

## (undated) DEVICE — PK EXTREM UPPR 10

## (undated) DEVICE — SOL LR 1000ML

## (undated) DEVICE — TP PLSTC CLR TRNSPORE 1IN SURG

## (undated) DEVICE — SPNG GZ WOVN 4X4IN 12PLY 10/BX STRL

## (undated) DEVICE — DRSNG TELFA PAD NONADH STR 1S 3X8IN

## (undated) DEVICE — NDL HYPO ECLPS SFTY 25G 1 1/2IN

## (undated) DEVICE — BNDG ESMARK 4IN 9FT LF STRL BLU

## (undated) DEVICE — BNDG ELAS MATRX  2IN 5YD LF STRL

## (undated) DEVICE — BNDG GZ SOF-FORM CONFRM 2X75IN LF STRL